# Patient Record
Sex: MALE | Race: WHITE | NOT HISPANIC OR LATINO | URBAN - METROPOLITAN AREA
[De-identification: names, ages, dates, MRNs, and addresses within clinical notes are randomized per-mention and may not be internally consistent; named-entity substitution may affect disease eponyms.]

---

## 2017-03-03 ENCOUNTER — OUTPATIENT (OUTPATIENT)
Dept: OUTPATIENT SERVICES | Facility: HOSPITAL | Age: 72
LOS: 1 days | End: 2017-03-03

## 2018-04-04 ENCOUNTER — RECORD ABSTRACTING (OUTPATIENT)
Age: 73
End: 2018-04-04

## 2018-04-04 DIAGNOSIS — M70.72 OTHER BURSITIS OF HIP, LEFT HIP: ICD-10-CM

## 2018-04-04 DIAGNOSIS — N43.40 SPERMATOCELE OF EPIDIDYMIS, UNSPECIFIED: ICD-10-CM

## 2018-04-04 DIAGNOSIS — N50.3 CYST OF EPIDIDYMIS: ICD-10-CM

## 2018-04-04 DIAGNOSIS — K63.5 POLYP OF COLON: ICD-10-CM

## 2018-04-04 DIAGNOSIS — Z82.49 FAMILY HISTORY OF ISCHEMIC HEART DISEASE AND OTHER DISEASES OF THE CIRCULATORY SYSTEM: ICD-10-CM

## 2018-04-04 DIAGNOSIS — Z87.898 PERSONAL HISTORY OF OTHER SPECIFIED CONDITIONS: ICD-10-CM

## 2018-04-04 DIAGNOSIS — M24.541 CONTRACTURE, RIGHT HAND: ICD-10-CM

## 2018-04-04 DIAGNOSIS — D36.9 BENIGN NEOPLASM, UNSPECIFIED SITE: ICD-10-CM

## 2018-04-04 RX ORDER — CYANOCOBALAMIN (VITAMIN B-12) 1000 MCG
1000 TABLET ORAL
Refills: 0 | Status: ACTIVE | COMMUNITY

## 2018-04-04 RX ORDER — METOPROLOL TARTRATE 25 MG/1
25 TABLET, FILM COATED ORAL TWICE DAILY
Refills: 0 | Status: ACTIVE | COMMUNITY

## 2018-04-04 RX ORDER — ASPIRIN 81 MG
81 TABLET, DELAYED RELEASE (ENTERIC COATED) ORAL
Refills: 0 | Status: ACTIVE | COMMUNITY

## 2018-04-04 RX ORDER — TIMOLOL MALEATE 5 MG/ML
0.5 SOLUTION OPHTHALMIC TWICE DAILY
Refills: 0 | Status: ACTIVE | COMMUNITY

## 2018-04-04 RX ORDER — LUTEIN 6 MG
20 TABLET ORAL
Refills: 0 | Status: ACTIVE | COMMUNITY

## 2018-04-09 ENCOUNTER — APPOINTMENT (OUTPATIENT)
Dept: FAMILY MEDICINE | Facility: CLINIC | Age: 73
End: 2018-04-09

## 2018-06-08 ENCOUNTER — APPOINTMENT (OUTPATIENT)
Dept: FAMILY MEDICINE | Facility: CLINIC | Age: 73
End: 2018-06-08
Payer: MEDICARE

## 2018-06-08 VITALS
BODY MASS INDEX: 22.44 KG/M2 | HEART RATE: 90 BPM | HEIGHT: 67 IN | RESPIRATION RATE: 15 BRPM | DIASTOLIC BLOOD PRESSURE: 90 MMHG | SYSTOLIC BLOOD PRESSURE: 166 MMHG | TEMPERATURE: 98.1 F | OXYGEN SATURATION: 98 % | WEIGHT: 143 LBS

## 2018-06-08 PROCEDURE — 99213 OFFICE O/P EST LOW 20 MIN: CPT | Mod: 25

## 2018-06-08 PROCEDURE — 81002 URINALYSIS NONAUTO W/O SCOPE: CPT | Mod: QW

## 2018-06-08 PROCEDURE — 36415 COLL VENOUS BLD VENIPUNCTURE: CPT

## 2018-06-08 PROCEDURE — 82044 UR ALBUMIN SEMIQUANTITATIVE: CPT | Mod: QW

## 2018-06-08 RX ORDER — ROSUVASTATIN CALCIUM 10 MG/1
10 TABLET, FILM COATED ORAL
Qty: 90 | Refills: 0 | Status: ACTIVE | COMMUNITY
Start: 2018-01-16

## 2018-06-08 RX ORDER — RAMIPRIL 5 MG/1
5 CAPSULE ORAL DAILY
Refills: 0 | Status: DISCONTINUED | COMMUNITY
End: 2018-06-08

## 2018-06-08 RX ORDER — ROSUVASTATIN CALCIUM 5 MG/1
5 TABLET, FILM COATED ORAL DAILY
Refills: 0 | Status: DISCONTINUED | COMMUNITY
End: 2018-06-08

## 2018-06-08 NOTE — HISTORY OF PRESENT ILLNESS
[FreeTextEntry1] : follow up, sees cardiologist for refills cc: denies [de-identified] : stable no changes

## 2018-06-12 LAB
ALBUMIN SERPL ELPH-MCNC: 4.4 G/DL
ALBUMIN: NORMAL
ALP BLD-CCNC: 109 U/L
ALT SERPL-CCNC: 23 U/L
ANION GAP SERPL CALC-SCNC: 10 MMOL/L
AST SERPL-CCNC: 23 U/L
BASOPHILS # BLD AUTO: 0.03 K/UL
BASOPHILS NFR BLD AUTO: 0.5 %
BILIRUB SERPL-MCNC: 0.6 MG/DL
BILIRUB UR QL STRIP: NORMAL
BUN SERPL-MCNC: 14 MG/DL
CALCIUM SERPL-MCNC: 9.5 MG/DL
CHLORIDE SERPL-SCNC: 104 MMOL/L
CHOLEST SERPL-MCNC: 126 MG/DL
CHOLEST/HDLC SERPL: 2.3 RATIO
CO2 SERPL-SCNC: 27 MMOL/L
CREAT SERPL-MCNC: 0.95 MG/DL
CREATININE: NORMAL
EOSINOPHIL # BLD AUTO: 0.17 K/UL
EOSINOPHIL NFR BLD AUTO: 2.8 %
GLUCOSE SERPL-MCNC: 102 MG/DL
GLUCOSE UR-MCNC: NORMAL
HCG UR QL: 0.2 EU/DL
HCT VFR BLD CALC: 39.2 %
HDLC SERPL-MCNC: 54 MG/DL
HGB BLD-MCNC: 12.3 G/DL
HGB UR QL STRIP.AUTO: NORMAL
IMM GRANULOCYTES NFR BLD AUTO: 0 %
KETONES UR-MCNC: NORMAL
LDLC SERPL CALC-MCNC: 54 MG/DL
LEUKOCYTE ESTERASE UR QL STRIP: NORMAL
LYMPHOCYTES # BLD AUTO: 1.44 K/UL
LYMPHOCYTES NFR BLD AUTO: 24 %
MAN DIFF?: NORMAL
MCHC RBC-ENTMCNC: 29.4 PG
MCHC RBC-ENTMCNC: 31.4 GM/DL
MCV RBC AUTO: 93.6 FL
MICROALBUMIN/CREAT UR TEST STR-RTO: NORMAL
MONOCYTES # BLD AUTO: 0.57 K/UL
MONOCYTES NFR BLD AUTO: 9.5 %
NEUTROPHILS # BLD AUTO: 3.79 K/UL
NEUTROPHILS NFR BLD AUTO: 63.2 %
NITRITE UR QL STRIP: NORMAL
PH UR STRIP: 7
PLATELET # BLD AUTO: 221 K/UL
POTASSIUM SERPL-SCNC: 5.3 MMOL/L
PROT SERPL-MCNC: 6.7 G/DL
PROT UR STRIP-MCNC: NORMAL
PSA SERPL-MCNC: 2.62 NG/ML
RBC # BLD: 4.19 M/UL
RBC # FLD: 12.6 %
SODIUM SERPL-SCNC: 141 MMOL/L
SP GR UR STRIP: 1.01
TRIGL SERPL-MCNC: 88 MG/DL
TSH SERPL-ACNC: 2.86 UIU/ML
VIT B12 SERPL-MCNC: 392 PG/ML
WBC # FLD AUTO: 6 K/UL

## 2018-10-31 ENCOUNTER — APPOINTMENT (OUTPATIENT)
Dept: FAMILY MEDICINE | Facility: CLINIC | Age: 73
End: 2018-10-31
Payer: MEDICARE

## 2018-10-31 VITALS
SYSTOLIC BLOOD PRESSURE: 176 MMHG | TEMPERATURE: 97.8 F | RESPIRATION RATE: 16 BRPM | DIASTOLIC BLOOD PRESSURE: 70 MMHG | BODY MASS INDEX: 22.76 KG/M2 | HEART RATE: 44 BPM | HEIGHT: 67 IN | WEIGHT: 145 LBS | OXYGEN SATURATION: 97 %

## 2018-10-31 PROCEDURE — 99213 OFFICE O/P EST LOW 20 MIN: CPT | Mod: 25

## 2018-10-31 PROCEDURE — G0008: CPT

## 2018-10-31 PROCEDURE — 36415 COLL VENOUS BLD VENIPUNCTURE: CPT

## 2018-10-31 RX ORDER — OMEPRAZOLE 40 MG/1
40 CAPSULE, DELAYED RELEASE ORAL
Refills: 0 | Status: ACTIVE | COMMUNITY

## 2018-10-31 NOTE — HEALTH RISK ASSESSMENT
[No falls in past year] : Patient reported no falls in the past year [0] : 2) Feeling down, depressed, or hopeless: Not at all (0) [] : No [de-identified] : Former [[id number]] : 0

## 2018-10-31 NOTE — PHYSICAL EXAM

## 2018-10-31 NOTE — HISTORY OF PRESENT ILLNESS
[FreeTextEntry1] : 3 Month F/u \par Pt is requesting Flu Vac. \par cc: left chest wall discomfort [de-identified] : thoracic surgeon evaluated chest wall closure affects\par no chest pain no sob \par hx aortic valve replacement

## 2018-11-01 LAB
ALBUMIN SERPL ELPH-MCNC: 4.1 G/DL
ALP BLD-CCNC: 88 U/L
ALT SERPL-CCNC: 25 U/L
ANION GAP SERPL CALC-SCNC: 11 MMOL/L
AST SERPL-CCNC: 24 U/L
BASOPHILS # BLD AUTO: 0.04 K/UL
BASOPHILS NFR BLD AUTO: 0.7 %
BILIRUB SERPL-MCNC: 0.5 MG/DL
BUN SERPL-MCNC: 10 MG/DL
CALCIUM SERPL-MCNC: 9.8 MG/DL
CHLORIDE SERPL-SCNC: 104 MMOL/L
CHOLEST SERPL-MCNC: 133 MG/DL
CHOLEST/HDLC SERPL: 2.4 RATIO
CO2 SERPL-SCNC: 27 MMOL/L
CREAT SERPL-MCNC: 0.88 MG/DL
EOSINOPHIL # BLD AUTO: 0.19 K/UL
EOSINOPHIL NFR BLD AUTO: 3.3 %
GLUCOSE SERPL-MCNC: 102 MG/DL
HCT VFR BLD CALC: 41 %
HDLC SERPL-MCNC: 55 MG/DL
HGB BLD-MCNC: 13.3 G/DL
IMM GRANULOCYTES NFR BLD AUTO: 0.2 %
LDLC SERPL CALC-MCNC: 62 MG/DL
LYMPHOCYTES # BLD AUTO: 1.27 K/UL
LYMPHOCYTES NFR BLD AUTO: 22.2 %
MAN DIFF?: NORMAL
MCHC RBC-ENTMCNC: 30.4 PG
MCHC RBC-ENTMCNC: 32.4 GM/DL
MCV RBC AUTO: 93.6 FL
MONOCYTES # BLD AUTO: 0.67 K/UL
MONOCYTES NFR BLD AUTO: 11.7 %
NEUTROPHILS # BLD AUTO: 3.54 K/UL
NEUTROPHILS NFR BLD AUTO: 61.9 %
PLATELET # BLD AUTO: 217 K/UL
POTASSIUM SERPL-SCNC: 5.1 MMOL/L
PROT SERPL-MCNC: 6.5 G/DL
PSA SERPL-MCNC: 2.74 NG/ML
RBC # BLD: 4.38 M/UL
RBC # FLD: 12.6 %
SODIUM SERPL-SCNC: 142 MMOL/L
TRIGL SERPL-MCNC: 82 MG/DL
TSH SERPL-ACNC: 2.5 UIU/ML
WBC # FLD AUTO: 5.72 K/UL

## 2018-11-14 ENCOUNTER — TRANSCRIPTION ENCOUNTER (OUTPATIENT)
Age: 73
End: 2018-11-14

## 2018-12-19 DIAGNOSIS — R35.1 NOCTURIA: ICD-10-CM

## 2019-03-27 ENCOUNTER — TRANSCRIPTION ENCOUNTER (OUTPATIENT)
Age: 74
End: 2019-03-27

## 2019-04-25 ENCOUNTER — APPOINTMENT (OUTPATIENT)
Dept: FAMILY MEDICINE | Facility: CLINIC | Age: 74
End: 2019-04-25
Payer: MEDICARE

## 2019-04-25 VITALS
HEART RATE: 48 BPM | SYSTOLIC BLOOD PRESSURE: 142 MMHG | DIASTOLIC BLOOD PRESSURE: 52 MMHG | WEIGHT: 144 LBS | OXYGEN SATURATION: 98 % | TEMPERATURE: 98.6 F | HEIGHT: 67 IN | BODY MASS INDEX: 22.6 KG/M2 | RESPIRATION RATE: 16 BRPM

## 2019-04-25 DIAGNOSIS — Z78.9 OTHER SPECIFIED HEALTH STATUS: ICD-10-CM

## 2019-04-25 DIAGNOSIS — H91.90 UNSPECIFIED HEARING LOSS, UNSPECIFIED EAR: ICD-10-CM

## 2019-04-25 LAB
ALBUMIN: NORMAL
BILIRUB UR QL STRIP: NORMAL
CREATININE: NORMAL
GLUCOSE UR-MCNC: NORMAL
HCG UR QL: 0.2 EU/DL
HGB UR QL STRIP.AUTO: NORMAL
KETONES UR-MCNC: NORMAL
LEUKOCYTE ESTERASE UR QL STRIP: NORMAL
MICROALBUMIN/CREAT UR TEST STR-RTO: NORMAL
NITRITE UR QL STRIP: NORMAL
PH UR STRIP: 7
PROT UR STRIP-MCNC: NORMAL
SP GR UR STRIP: 1.01

## 2019-04-25 PROCEDURE — 90715 TDAP VACCINE 7 YRS/> IM: CPT | Mod: GY

## 2019-04-25 PROCEDURE — G0444 DEPRESSION SCREEN ANNUAL: CPT | Mod: 59

## 2019-04-25 PROCEDURE — 81003 URINALYSIS AUTO W/O SCOPE: CPT | Mod: QW

## 2019-04-25 PROCEDURE — 82044 UR ALBUMIN SEMIQUANTITATIVE: CPT | Mod: QW

## 2019-04-25 PROCEDURE — 90471 IMMUNIZATION ADMIN: CPT | Mod: GY

## 2019-04-25 PROCEDURE — 99214 OFFICE O/P EST MOD 30 MIN: CPT | Mod: 25

## 2019-04-25 PROCEDURE — 36415 COLL VENOUS BLD VENIPUNCTURE: CPT

## 2019-04-25 PROCEDURE — G0442 ANNUAL ALCOHOL SCREEN 15 MIN: CPT | Mod: 59

## 2019-04-25 NOTE — HISTORY OF PRESENT ILLNESS
[FreeTextEntry1] : routine f/u with Dr Whitehead -FASTING - pt presents with bw orders from Dr Ashley and Dr Gordon\par states he does not need refills at this time [de-identified] : 73 male htn hld gerd\par controlled with meds\par  followed by cardio\par CAD CABG aortic valve replacement bovine \par nasal congetsion rhinorrhea\par

## 2019-04-25 NOTE — PHYSICAL EXAM
[No Acute Distress] : no acute distress [Well Nourished] : well nourished [Well Developed] : well developed [Well-Appearing] : well-appearing [Normal Sclera/Conjunctiva] : normal sclera/conjunctiva [PERRL] : pupils equal round and reactive to light [EOMI] : extraocular movements intact [Normal Outer Ear/Nose] : the outer ears and nose were normal in appearance [Normal Oropharynx] : the oropharynx was normal [No JVD] : no jugular venous distention [Supple] : supple [No Lymphadenopathy] : no lymphadenopathy [Thyroid Normal, No Nodules] : the thyroid was normal and there were no nodules present [No Respiratory Distress] : no respiratory distress  [No Accessory Muscle Use] : no accessory muscle use [Clear to Auscultation] : lungs were clear to auscultation bilaterally [Normal Rate] : normal rate  [Regular Rhythm] : with a regular rhythm [Normal S1, S2] : normal S1 and S2 [No Carotid Bruits] : no carotid bruits [No Abdominal Bruit] : a ~M bruit was not heard ~T in the abdomen [No Varicosities] : no varicosities [Pedal Pulses Present] : the pedal pulses are present [No Edema] : there was no peripheral edema [No Palpable Aorta] : no palpable aorta [No Extremity Clubbing/Cyanosis] : no extremity clubbing/cyanosis [Soft] : abdomen soft [Non Tender] : non-tender [Non-distended] : non-distended [No Masses] : no abdominal mass palpated [No HSM] : no HSM [Normal Bowel Sounds] : normal bowel sounds [Normal Anterior Cervical Nodes] : no anterior cervical lymphadenopathy [Normal Posterior Cervical Nodes] : no posterior cervical lymphadenopathy [No Spinal Tenderness] : no spinal tenderness [No CVA Tenderness] : no CVA  tenderness [No Joint Swelling] : no joint swelling [Grossly Normal Strength/Tone] : grossly normal strength/tone [No Rash] : no rash [Normal Gait] : normal gait [Coordination Grossly Intact] : coordination grossly intact [No Focal Deficits] : no focal deficits [Deep Tendon Reflexes (DTR)] : deep tendon reflexes were 2+ and symmetric [Normal Insight/Judgement] : insight and judgment were intact [Normal Affect] : the affect was normal

## 2019-04-29 LAB
ALBUMIN SERPL ELPH-MCNC: 4.3 G/DL
ALP BLD-CCNC: 98 U/L
ALT SERPL-CCNC: 21 U/L
ANION GAP SERPL CALC-SCNC: 11 MMOL/L
AST SERPL-CCNC: 22 U/L
BASOPHILS # BLD AUTO: 0.03 K/UL
BASOPHILS NFR BLD AUTO: 0.5 %
BILIRUB SERPL-MCNC: 0.5 MG/DL
BUN SERPL-MCNC: 13 MG/DL
CALCIUM SERPL-MCNC: 9.6 MG/DL
CHLORIDE SERPL-SCNC: 102 MMOL/L
CHOLEST SERPL-MCNC: 121 MG/DL
CHOLEST/HDLC SERPL: 2.5 RATIO
CO2 SERPL-SCNC: 26 MMOL/L
CREAT SERPL-MCNC: 0.94 MG/DL
EOSINOPHIL # BLD AUTO: 0.1 K/UL
EOSINOPHIL NFR BLD AUTO: 1.7 %
GLUCOSE SERPL-MCNC: 97 MG/DL
HCT VFR BLD CALC: 40.8 %
HDLC SERPL-MCNC: 49 MG/DL
HGB BLD-MCNC: 13 G/DL
IMM GRANULOCYTES NFR BLD AUTO: 0.3 %
IRON SATN MFR SERPL: 27 %
IRON SERPL-MCNC: 77 UG/DL
LDLC SERPL CALC-MCNC: 57 MG/DL
LYMPHOCYTES # BLD AUTO: 1.24 K/UL
LYMPHOCYTES NFR BLD AUTO: 20.7 %
MAN DIFF?: NORMAL
MCHC RBC-ENTMCNC: 30.5 PG
MCHC RBC-ENTMCNC: 31.9 GM/DL
MCV RBC AUTO: 95.8 FL
MONOCYTES # BLD AUTO: 0.57 K/UL
MONOCYTES NFR BLD AUTO: 9.5 %
NEUTROPHILS # BLD AUTO: 4.03 K/UL
NEUTROPHILS NFR BLD AUTO: 67.3 %
PLATELET # BLD AUTO: 208 K/UL
POTASSIUM SERPL-SCNC: 4.7 MMOL/L
PROT SERPL-MCNC: 6.4 G/DL
PSA SERPL-MCNC: 2.82 NG/ML
RBC # BLD: 4.26 M/UL
RBC # FLD: 11.9 %
SODIUM SERPL-SCNC: 138 MMOL/L
TIBC SERPL-MCNC: 288 UG/DL
TRIGL SERPL-MCNC: 74 MG/DL
TSH SERPL-ACNC: 2.63 UIU/ML
UIBC SERPL-MCNC: 211 UG/DL
WBC # FLD AUTO: 5.99 K/UL

## 2019-05-17 ENCOUNTER — APPOINTMENT (OUTPATIENT)
Dept: RADIOLOGY | Facility: CLINIC | Age: 74
End: 2019-05-17
Payer: MEDICARE

## 2019-05-17 PROCEDURE — 71100 X-RAY EXAM RIBS UNI 2 VIEWS: CPT | Mod: LT

## 2019-06-13 ENCOUNTER — OUTPATIENT (OUTPATIENT)
Dept: OUTPATIENT SERVICES | Facility: HOSPITAL | Age: 74
LOS: 1 days | End: 2019-06-13

## 2019-07-11 ENCOUNTER — APPOINTMENT (OUTPATIENT)
Dept: FAMILY MEDICINE | Facility: CLINIC | Age: 74
End: 2019-07-11
Payer: MEDICARE

## 2019-07-11 VITALS
OXYGEN SATURATION: 98 % | HEIGHT: 67 IN | DIASTOLIC BLOOD PRESSURE: 58 MMHG | BODY MASS INDEX: 22.91 KG/M2 | SYSTOLIC BLOOD PRESSURE: 138 MMHG | RESPIRATION RATE: 16 BRPM | TEMPERATURE: 98.3 F | HEART RATE: 49 BPM | WEIGHT: 146 LBS
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
## 2019-07-11 DIAGNOSIS — M54.5 LOW BACK PAIN: ICD-10-CM

## 2019-07-11 PROCEDURE — 99213 OFFICE O/P EST LOW 20 MIN: CPT

## 2019-07-11 NOTE — PHYSICAL EXAM
[No Acute Distress] : no acute distress [No CVA Tenderness] : no CVA  tenderness [No Spinal Tenderness] : no spinal tenderness [No Focal Deficits] : no focal deficits [de-identified] : slow gait

## 2019-07-11 NOTE — HISTORY OF PRESENT ILLNESS
[FreeTextEntry8] : pt pulled back on fourth of july \par pt states he was bit by a tick a month ago but was informed to wait \par [facility] [place] \par \par slowly getting better was 8/10 now 5/10\par slow walking\par getting up worse \par \par tick bite left ankle 1 mo no change

## 2019-08-05 ENCOUNTER — RX RENEWAL (OUTPATIENT)
Age: 74
End: 2019-08-05

## 2019-09-17 ENCOUNTER — APPOINTMENT (OUTPATIENT)
Dept: FAMILY MEDICINE | Facility: CLINIC | Age: 74
End: 2019-09-17
Payer: MEDICARE

## 2019-09-17 VITALS
HEIGHT: 67 IN | WEIGHT: 147 LBS | DIASTOLIC BLOOD PRESSURE: 60 MMHG | TEMPERATURE: 98 F | HEART RATE: 54 BPM | OXYGEN SATURATION: 98 % | RESPIRATION RATE: 16 BRPM | SYSTOLIC BLOOD PRESSURE: 140 MMHG | BODY MASS INDEX: 23.07 KG/M2

## 2019-09-17 DIAGNOSIS — Z97.4 PRESENCE OF EXTERNAL HEARING-AID: ICD-10-CM

## 2019-09-17 PROCEDURE — 99214 OFFICE O/P EST MOD 30 MIN: CPT

## 2019-09-17 NOTE — HISTORY OF PRESENT ILLNESS
[Post-hospitalization from ___ Hospital] : Post-hospitalization from [unfilled] Hospital [Admitted on: ___] : The patient was admitted on [unfilled] [FreeTextEntry2] : Pt was away in [place] and was seen at [facility], He was hiking and fell on a rock, landing on his left side, fractured left humerus. \par Pt had x-rays done at hospital of left shoulder and humerus \par Pt was prescribed Ketorolac 10mg, and Oxycodone 5-325mg \par Pt brought in discharge papers \par pain worse at night

## 2019-09-17 NOTE — PHYSICAL EXAM
[Well Nourished] : well nourished [No Acute Distress] : no acute distress [Well Developed] : well developed [Well-Appearing] : well-appearing [Normal Sclera/Conjunctiva] : normal sclera/conjunctiva [EOMI] : extraocular movements intact [PERRL] : pupils equal round and reactive to light [Normal Oropharynx] : the oropharynx was normal [Normal Outer Ear/Nose] : the outer ears and nose were normal in appearance [No JVD] : no jugular venous distention [No Lymphadenopathy] : no lymphadenopathy [Supple] : supple [No Respiratory Distress] : no respiratory distress  [Thyroid Normal, No Nodules] : the thyroid was normal and there were no nodules present [No Accessory Muscle Use] : no accessory muscle use [Clear to Auscultation] : lungs were clear to auscultation bilaterally [Normal Rate] : normal rate  [No Murmur] : no murmur heard [Regular Rhythm] : with a regular rhythm [Normal S1, S2] : normal S1 and S2 [No Carotid Bruits] : no carotid bruits [No Abdominal Bruit] : a ~M bruit was not heard ~T in the abdomen [No Varicosities] : no varicosities [Pedal Pulses Present] : the pedal pulses are present [No Palpable Aorta] : no palpable aorta [No Edema] : there was no peripheral edema [Non Tender] : non-tender [Soft] : abdomen soft [No Extremity Clubbing/Cyanosis] : no extremity clubbing/cyanosis [Non-distended] : non-distended [No Masses] : no abdominal mass palpated [Normal Bowel Sounds] : normal bowel sounds [No HSM] : no HSM [Normal Posterior Cervical Nodes] : no posterior cervical lymphadenopathy [No CVA Tenderness] : no CVA  tenderness [Normal Anterior Cervical Nodes] : no anterior cervical lymphadenopathy [No Joint Swelling] : no joint swelling [No Spinal Tenderness] : no spinal tenderness [Coordination Grossly Intact] : coordination grossly intact [No Rash] : no rash [No Focal Deficits] : no focal deficits [Normal Gait] : normal gait [Deep Tendon Reflexes (DTR)] : deep tendon reflexes were 2+ and symmetric [Normal Affect] : the affect was normal [Normal Insight/Judgement] : insight and judgment were intact [de-identified] : bruising lue  [de-identified] : [facility]

## 2019-10-17 ENCOUNTER — APPOINTMENT (OUTPATIENT)
Dept: FAMILY MEDICINE | Facility: CLINIC | Age: 74
End: 2019-10-17
Payer: MEDICARE

## 2019-10-17 VITALS
OXYGEN SATURATION: 99 % | TEMPERATURE: 97.9 F | WEIGHT: 147 LBS | HEART RATE: 51 BPM | SYSTOLIC BLOOD PRESSURE: 130 MMHG | DIASTOLIC BLOOD PRESSURE: 70 MMHG | BODY MASS INDEX: 23.07 KG/M2 | HEIGHT: 67 IN | RESPIRATION RATE: 16 BRPM

## 2019-10-17 DIAGNOSIS — D50.9 IRON DEFICIENCY ANEMIA, UNSPECIFIED: ICD-10-CM

## 2019-10-17 DIAGNOSIS — Z12.5 ENCOUNTER FOR SCREENING FOR MALIGNANT NEOPLASM OF PROSTATE: ICD-10-CM

## 2019-10-17 DIAGNOSIS — Q23.9 CONGENITAL MALFORMATION OF AORTIC AND MITRAL VALVES, UNSPECIFIED: ICD-10-CM

## 2019-10-17 DIAGNOSIS — S42.309A UNSPECIFIED FRACTURE OF SHAFT OF HUMERUS, UNSPECIFIED ARM, INITIAL ENCOUNTER FOR CLOSED FRACTURE: ICD-10-CM

## 2019-10-17 DIAGNOSIS — Z80.42 FAMILY HISTORY OF MALIGNANT NEOPLASM OF PROSTATE: ICD-10-CM

## 2019-10-17 PROCEDURE — 90686 IIV4 VACC NO PRSV 0.5 ML IM: CPT

## 2019-10-17 PROCEDURE — G0008: CPT

## 2019-10-17 PROCEDURE — 99214 OFFICE O/P EST MOD 30 MIN: CPT | Mod: 25

## 2019-10-17 PROCEDURE — 36415 COLL VENOUS BLD VENIPUNCTURE: CPT

## 2019-10-17 NOTE — HISTORY OF PRESENT ILLNESS
[FreeTextEntry1] : Pt here for a follow up\par Lab results \par Requesting FLU Vaccine  [de-identified] : left humerus fx sling brace removed pt rehab pending 1 week pain with movement \par skin changes \par followed by cardio check lipids review meds\par  alert active nad iron def\par started iron 1 week ago\par anemia

## 2019-10-17 NOTE — PLAN
[FreeTextEntry1] : ortho fu \par pt rehab \par cardio fu as directed \par statin therapy \par iron\par 3 mo fu sooner as needed.\par

## 2019-10-17 NOTE — PHYSICAL EXAM
[No Acute Distress] : no acute distress [Well Developed] : well developed [Well Nourished] : well nourished [Normal Sclera/Conjunctiva] : normal sclera/conjunctiva [PERRL] : pupils equal round and reactive to light [Well-Appearing] : well-appearing [Normal Outer Ear/Nose] : the outer ears and nose were normal in appearance [EOMI] : extraocular movements intact [Normal Oropharynx] : the oropharynx was normal [No JVD] : no jugular venous distention [No Lymphadenopathy] : no lymphadenopathy [Supple] : supple [No Respiratory Distress] : no respiratory distress  [Thyroid Normal, No Nodules] : the thyroid was normal and there were no nodules present [Clear to Auscultation] : lungs were clear to auscultation bilaterally [No Accessory Muscle Use] : no accessory muscle use [Normal Rate] : normal rate  [Regular Rhythm] : with a regular rhythm [Normal S1, S2] : normal S1 and S2 [No Murmur] : no murmur heard [No Carotid Bruits] : no carotid bruits [No Abdominal Bruit] : a ~M bruit was not heard ~T in the abdomen [No Varicosities] : no varicosities [No Edema] : there was no peripheral edema [Pedal Pulses Present] : the pedal pulses are present [No Palpable Aorta] : no palpable aorta [No Extremity Clubbing/Cyanosis] : no extremity clubbing/cyanosis [Non Tender] : non-tender [Soft] : abdomen soft [Non-distended] : non-distended [No HSM] : no HSM [No Masses] : no abdominal mass palpated [Normal Bowel Sounds] : normal bowel sounds [Normal Posterior Cervical Nodes] : no posterior cervical lymphadenopathy [Normal Anterior Cervical Nodes] : no anterior cervical lymphadenopathy [No CVA Tenderness] : no CVA  tenderness [No Spinal Tenderness] : no spinal tenderness [No Joint Swelling] : no joint swelling [No Rash] : no rash [No Focal Deficits] : no focal deficits [Coordination Grossly Intact] : coordination grossly intact [Normal Gait] : normal gait [Deep Tendon Reflexes (DTR)] : deep tendon reflexes were 2+ and symmetric [Normal Insight/Judgement] : insight and judgment were intact [Normal Affect] : the affect was normal [de-identified] : limited ext rom guarded lue  [de-identified] : skin changes noted affected arm

## 2019-10-21 LAB
ALBUMIN SERPL ELPH-MCNC: 4.7 G/DL
ALP BLD-CCNC: 113 U/L
ALT SERPL-CCNC: 21 U/L
ANION GAP SERPL CALC-SCNC: 12 MMOL/L
AST SERPL-CCNC: 23 U/L
BASOPHILS # BLD AUTO: 0.07 K/UL
BASOPHILS NFR BLD AUTO: 1.1 %
BILIRUB SERPL-MCNC: 0.4 MG/DL
BUN SERPL-MCNC: 13 MG/DL
CALCIUM SERPL-MCNC: 9.8 MG/DL
CHLORIDE SERPL-SCNC: 102 MMOL/L
CHOLEST SERPL-MCNC: 144 MG/DL
CHOLEST/HDLC SERPL: 2.5 RATIO
CO2 SERPL-SCNC: 25 MMOL/L
CREAT SERPL-MCNC: 0.83 MG/DL
EOSINOPHIL # BLD AUTO: 0.23 K/UL
EOSINOPHIL NFR BLD AUTO: 3.5 %
FERRITIN SERPL-MCNC: 359 NG/ML
GLUCOSE SERPL-MCNC: 93 MG/DL
HCT VFR BLD CALC: 39.2 %
HDLC SERPL-MCNC: 57 MG/DL
HGB BLD-MCNC: 12.3 G/DL
IMM GRANULOCYTES NFR BLD AUTO: 0.3 %
IRON SATN MFR SERPL: 23 %
IRON SERPL-MCNC: 71 UG/DL
LDLC SERPL CALC-MCNC: 69 MG/DL
LYMPHOCYTES # BLD AUTO: 1.21 K/UL
LYMPHOCYTES NFR BLD AUTO: 18.6 %
MAN DIFF?: NORMAL
MCHC RBC-ENTMCNC: 29.8 PG
MCHC RBC-ENTMCNC: 31.4 GM/DL
MCV RBC AUTO: 94.9 FL
MONOCYTES # BLD AUTO: 0.56 K/UL
MONOCYTES NFR BLD AUTO: 8.6 %
NEUTROPHILS # BLD AUTO: 4.43 K/UL
NEUTROPHILS NFR BLD AUTO: 67.9 %
PLATELET # BLD AUTO: 212 K/UL
POTASSIUM SERPL-SCNC: 4.9 MMOL/L
PROT SERPL-MCNC: 6.8 G/DL
RBC # BLD: 4.13 M/UL
RBC # FLD: 12.5 %
SODIUM SERPL-SCNC: 139 MMOL/L
TIBC SERPL-MCNC: 308 UG/DL
TRIGL SERPL-MCNC: 91 MG/DL
UIBC SERPL-MCNC: 237 UG/DL
WBC # FLD AUTO: 6.52 K/UL

## 2020-05-07 ENCOUNTER — APPOINTMENT (OUTPATIENT)
Dept: FAMILY MEDICINE | Facility: CLINIC | Age: 75
End: 2020-05-07
Payer: MEDICARE

## 2020-05-07 VITALS
RESPIRATION RATE: 16 BRPM | WEIGHT: 138 LBS | HEIGHT: 67 IN | SYSTOLIC BLOOD PRESSURE: 190 MMHG | TEMPERATURE: 98.1 F | HEART RATE: 50 BPM | DIASTOLIC BLOOD PRESSURE: 70 MMHG | BODY MASS INDEX: 21.66 KG/M2 | OXYGEN SATURATION: 99 %

## 2020-05-07 DIAGNOSIS — K21.9 GASTRO-ESOPHAGEAL REFLUX DISEASE W/OUT ESOPHAGITIS: ICD-10-CM

## 2020-05-07 DIAGNOSIS — H40.9 UNSPECIFIED GLAUCOMA: ICD-10-CM

## 2020-05-07 DIAGNOSIS — R51 HEADACHE: ICD-10-CM

## 2020-05-07 DIAGNOSIS — E78.2 MIXED HYPERLIPIDEMIA: ICD-10-CM

## 2020-05-07 PROCEDURE — 36415 COLL VENOUS BLD VENIPUNCTURE: CPT

## 2020-05-07 PROCEDURE — 99214 OFFICE O/P EST MOD 30 MIN: CPT | Mod: 25

## 2020-05-07 RX ORDER — FERROUS SULFATE 325(65) MG
325 (65 FE) TABLET ORAL DAILY
Qty: 90 | Refills: 0 | Status: DISCONTINUED | COMMUNITY
Start: 2019-09-17 | End: 2020-05-07

## 2020-05-07 RX ORDER — TIZANIDINE 4 MG/1
4 TABLET ORAL
Qty: 60 | Refills: 0 | Status: DISCONTINUED | COMMUNITY
Start: 2019-07-11 | End: 2020-05-07

## 2020-05-07 RX ORDER — AMOXICILLIN 500 MG/1
500 CAPSULE ORAL
Qty: 4 | Refills: 0 | Status: DISCONTINUED | COMMUNITY
Start: 2017-12-21 | End: 2020-05-07

## 2020-05-07 RX ORDER — ACETAMINOPHEN AND CODEINE PHOSPHATE 300; 30 MG/1; MG/1
300-30 TABLET ORAL
Qty: 30 | Refills: 0 | Status: DISCONTINUED | COMMUNITY
Start: 2019-09-17 | End: 2020-05-07

## 2020-05-07 NOTE — HISTORY OF PRESENT ILLNESS
[FreeTextEntry1] : Pt here for follow up and fasting blood work\par Pt c/o pressure headache, between eyes, sneezing, followed by nasal discharge, stuffy nose while sleeping usually on one side \par [facility] [place]  [de-identified] : and elevated bp few weeks 4-6\par stable alert NAD\par Review of symptoms\par as above \par bp 160/palp\par review meds polypharm

## 2020-05-07 NOTE — PHYSICAL EXAM
[Well Nourished] : well nourished [No Acute Distress] : no acute distress [Well Developed] : well developed [Normal Sclera/Conjunctiva] : normal sclera/conjunctiva [Well-Appearing] : well-appearing [EOMI] : extraocular movements intact [PERRL] : pupils equal round and reactive to light [Normal Outer Ear/Nose] : the outer ears and nose were normal in appearance [Normal Oropharynx] : the oropharynx was normal [No JVD] : no jugular venous distention [Supple] : supple [No Lymphadenopathy] : no lymphadenopathy [Thyroid Normal, No Nodules] : the thyroid was normal and there were no nodules present [No Respiratory Distress] : no respiratory distress  [No Accessory Muscle Use] : no accessory muscle use [Normal Rate] : normal rate  [Clear to Auscultation] : lungs were clear to auscultation bilaterally [No Murmur] : no murmur heard [Regular Rhythm] : with a regular rhythm [Normal S1, S2] : normal S1 and S2 [No Carotid Bruits] : no carotid bruits [No Abdominal Bruit] : a ~M bruit was not heard ~T in the abdomen [No Varicosities] : no varicosities [No Edema] : there was no peripheral edema [Pedal Pulses Present] : the pedal pulses are present [No Extremity Clubbing/Cyanosis] : no extremity clubbing/cyanosis [No Palpable Aorta] : no palpable aorta [Non-distended] : non-distended [Soft] : abdomen soft [Non Tender] : non-tender [No Masses] : no abdominal mass palpated [No HSM] : no HSM [Normal Posterior Cervical Nodes] : no posterior cervical lymphadenopathy [Normal Bowel Sounds] : normal bowel sounds [No CVA Tenderness] : no CVA  tenderness [Normal Anterior Cervical Nodes] : no anterior cervical lymphadenopathy [No Spinal Tenderness] : no spinal tenderness [No Joint Swelling] : no joint swelling [No Rash] : no rash [Grossly Normal Strength/Tone] : grossly normal strength/tone [Coordination Grossly Intact] : coordination grossly intact [No Focal Deficits] : no focal deficits [Normal Gait] : normal gait [Normal Insight/Judgement] : insight and judgment were intact [Normal Affect] : the affect was normal [Deep Tendon Reflexes (DTR)] : deep tendon reflexes were 2+ and symmetric

## 2020-05-07 NOTE — PLAN
[FreeTextEntry1] : medications as directed.\par 10 day fu bp\par urology fu as directed psa\par cardio fu

## 2020-05-07 NOTE — REVIEW OF SYSTEMS
[Patient Intake Form Reviewed] : Patient intake form was reviewed [Headache] : headache [Nasal Discharge] : nasal discharge [Negative] : Heme/Lymph

## 2020-05-08 LAB
ALBUMIN SERPL ELPH-MCNC: 4.4 G/DL
ALP BLD-CCNC: 90 U/L
ALT SERPL-CCNC: 15 U/L
ANION GAP SERPL CALC-SCNC: 13 MMOL/L
AST SERPL-CCNC: 18 U/L
BASOPHILS # BLD AUTO: 0.06 K/UL
BASOPHILS NFR BLD AUTO: 1.1 %
BILIRUB SERPL-MCNC: 0.5 MG/DL
BUN SERPL-MCNC: 15 MG/DL
CALCIUM SERPL-MCNC: 9.8 MG/DL
CHLORIDE SERPL-SCNC: 104 MMOL/L
CHOLEST SERPL-MCNC: 117 MG/DL
CHOLEST/HDLC SERPL: 2.4 RATIO
CO2 SERPL-SCNC: 24 MMOL/L
CREAT SERPL-MCNC: 0.91 MG/DL
EOSINOPHIL # BLD AUTO: 0.22 K/UL
EOSINOPHIL NFR BLD AUTO: 4 %
GLUCOSE SERPL-MCNC: 91 MG/DL
HCT VFR BLD CALC: 39 %
HDLC SERPL-MCNC: 49 MG/DL
HGB BLD-MCNC: 12 G/DL
IMM GRANULOCYTES NFR BLD AUTO: 0.4 %
LDLC SERPL CALC-MCNC: 55 MG/DL
LYMPHOCYTES # BLD AUTO: 1.04 K/UL
LYMPHOCYTES NFR BLD AUTO: 19 %
MAN DIFF?: NORMAL
MCHC RBC-ENTMCNC: 29.9 PG
MCHC RBC-ENTMCNC: 30.8 GM/DL
MCV RBC AUTO: 97 FL
MONOCYTES # BLD AUTO: 0.53 K/UL
MONOCYTES NFR BLD AUTO: 9.7 %
NEUTROPHILS # BLD AUTO: 3.61 K/UL
NEUTROPHILS NFR BLD AUTO: 65.8 %
PLATELET # BLD AUTO: 174 K/UL
POTASSIUM SERPL-SCNC: 4.5 MMOL/L
PROT SERPL-MCNC: 6.1 G/DL
PSA SERPL-MCNC: 2.38 NG/ML
RBC # BLD: 4.02 M/UL
RBC # FLD: 13 %
SODIUM SERPL-SCNC: 142 MMOL/L
TRIGL SERPL-MCNC: 64 MG/DL
TSH SERPL-ACNC: 2.61 UIU/ML
WBC # FLD AUTO: 5.48 K/UL

## 2020-05-18 ENCOUNTER — APPOINTMENT (OUTPATIENT)
Dept: FAMILY MEDICINE | Facility: CLINIC | Age: 75
End: 2020-05-18
Payer: MEDICARE

## 2020-05-18 ENCOUNTER — RESULT CHARGE (OUTPATIENT)
Age: 75
End: 2020-05-18

## 2020-05-18 VITALS
TEMPERATURE: 98.1 F | RESPIRATION RATE: 16 BRPM | WEIGHT: 140 LBS | DIASTOLIC BLOOD PRESSURE: 70 MMHG | HEIGHT: 67 IN | OXYGEN SATURATION: 98 % | HEART RATE: 52 BPM | SYSTOLIC BLOOD PRESSURE: 190 MMHG | BODY MASS INDEX: 21.97 KG/M2

## 2020-05-18 DIAGNOSIS — N40.0 BENIGN PROSTATIC HYPERPLASIA WITHOUT LOWER URINARY TRACT SYMPMS: ICD-10-CM

## 2020-05-18 DIAGNOSIS — I10 ESSENTIAL (PRIMARY) HYPERTENSION: ICD-10-CM

## 2020-05-18 DIAGNOSIS — R03.0 ELEVATED BLOOD-PRESSURE READING, W/OUT DIAGNOSIS OF HYPERTENSION: ICD-10-CM

## 2020-05-18 DIAGNOSIS — Z00.00 ENCOUNTER FOR GENERAL ADULT MEDICAL EXAMINATION W/OUT ABNORMAL FINDINGS: ICD-10-CM

## 2020-05-18 PROCEDURE — 82044 UR ALBUMIN SEMIQUANTITATIVE: CPT | Mod: QW

## 2020-05-18 PROCEDURE — 99213 OFFICE O/P EST LOW 20 MIN: CPT | Mod: 25

## 2020-05-18 PROCEDURE — 36415 COLL VENOUS BLD VENIPUNCTURE: CPT

## 2020-05-18 PROCEDURE — 81003 URINALYSIS AUTO W/O SCOPE: CPT | Mod: QW

## 2020-05-18 RX ORDER — LISINOPRIL 20 MG/1
20 TABLET ORAL
Qty: 180 | Refills: 1 | Status: ACTIVE | COMMUNITY
Start: 2018-03-21 | End: 1900-01-01

## 2020-05-19 PROBLEM — R03.0 ELEVATED BLOOD PRESSURE READING: Status: ACTIVE | Noted: 2020-05-07

## 2020-05-19 PROBLEM — N40.0 HYPERTROPHY OF PROSTATE WITHOUT URINARY OBSTRUCTION AND OTHER LOWER URINARY TRACT SYMPTOMS (LUTS): Status: ACTIVE | Noted: 2018-04-04

## 2020-05-19 LAB
BASOPHILS # BLD AUTO: 0.05 K/UL
BASOPHILS NFR BLD AUTO: 0.7 %
EOSINOPHIL # BLD AUTO: 0.27 K/UL
EOSINOPHIL NFR BLD AUTO: 4 %
HCT VFR BLD CALC: 39.4 %
HGB BLD-MCNC: 12.5 G/DL
IMM GRANULOCYTES NFR BLD AUTO: 0.3 %
LYMPHOCYTES # BLD AUTO: 1.12 K/UL
LYMPHOCYTES NFR BLD AUTO: 16.4 %
MAN DIFF?: NORMAL
MCHC RBC-ENTMCNC: 30.6 PG
MCHC RBC-ENTMCNC: 31.7 GM/DL
MCV RBC AUTO: 96.6 FL
MONOCYTES # BLD AUTO: 0.62 K/UL
MONOCYTES NFR BLD AUTO: 9.1 %
NEUTROPHILS # BLD AUTO: 4.74 K/UL
NEUTROPHILS NFR BLD AUTO: 69.5 %
PLATELET # BLD AUTO: 189 K/UL
RBC # BLD: 4.08 M/UL
RBC # FLD: 12.9 %
WBC # FLD AUTO: 6.82 K/UL

## 2020-05-19 NOTE — HISTORY OF PRESENT ILLNESS
[FreeTextEntry1] : Pt here for follow up\par [facility] [place]  [de-identified] : stable alert NAD\par no acute changes\par

## 2020-05-19 NOTE — PLAN
[FreeTextEntry1] : improved bp medications as directed.\par urology fu this summer \par fasting labs on follow up\par \par 3 mo sooner as needed.\par

## 2020-08-12 ENCOUNTER — APPOINTMENT (OUTPATIENT)
Dept: FAMILY MEDICINE | Facility: CLINIC | Age: 75
End: 2020-08-12
Payer: MEDICARE

## 2020-08-12 VITALS
WEIGHT: 140 LBS | DIASTOLIC BLOOD PRESSURE: 50 MMHG | RESPIRATION RATE: 16 BRPM | HEIGHT: 67 IN | BODY MASS INDEX: 21.97 KG/M2 | TEMPERATURE: 98 F | OXYGEN SATURATION: 98 % | HEART RATE: 60 BPM | SYSTOLIC BLOOD PRESSURE: 140 MMHG

## 2020-08-12 DIAGNOSIS — T63.441A TOXIC EFFECT OF VENOM OF BEES, ACCIDENTAL (UNINTENTIONAL), INITIAL ENCOUNTER: ICD-10-CM

## 2020-08-12 PROCEDURE — 99214 OFFICE O/P EST MOD 30 MIN: CPT | Mod: 25

## 2020-08-12 PROCEDURE — 96372 THER/PROPH/DIAG INJ SC/IM: CPT | Mod: 59

## 2020-08-12 RX ORDER — METHYLPREDNISOLONE 4 MG/1
4 TABLET ORAL
Qty: 1 | Refills: 0 | Status: ACTIVE | COMMUNITY
Start: 2020-08-12 | End: 1900-01-01

## 2020-08-12 RX ORDER — CHLORTHALIDONE 25 MG/1
25 TABLET ORAL
Refills: 0 | Status: ACTIVE | COMMUNITY

## 2020-08-12 RX ADMIN — DEXAMETHASONE SODIUM PHOSPHATE 1 MG/ML: 10 INJECTION INTRAMUSCULAR; INTRAVENOUS at 00:00

## 2020-08-13 RX ORDER — DEXAMETHASONE SODIUM PHOSPHATE 10 MG/ML
10 INJECTION INTRAMUSCULAR; INTRAVENOUS
Qty: 0 | Refills: 0 | Status: COMPLETED | OUTPATIENT
Start: 2020-08-12

## 2020-08-13 RX ADMIN — DEXAMETHASONE SODIUM PHOSPHATE 1 MG/10ML: 10 INJECTION, SOLUTION INTRAMUSCULAR; INTRAVENOUS at 00:00

## 2020-08-13 NOTE — HISTORY OF PRESENT ILLNESS
[FreeTextEntry8] : KAYLA states he was stung in four places by bees over a week and still is having a rash

## 2020-08-13 NOTE — PHYSICAL EXAM
[No Acute Distress] : no acute distress [Well-Appearing] : well-appearing [Well Nourished] : well nourished [Well Developed] : well developed [PERRL] : pupils equal round and reactive to light [Normal Sclera/Conjunctiva] : normal sclera/conjunctiva [Normal Outer Ear/Nose] : the outer ears and nose were normal in appearance [EOMI] : extraocular movements intact [No JVD] : no jugular venous distention [No Lymphadenopathy] : no lymphadenopathy [Supple] : supple [No Respiratory Distress] : no respiratory distress  [Clear to Auscultation] : lungs were clear to auscultation bilaterally [Thyroid Normal, No Nodules] : the thyroid was normal and there were no nodules present [No Accessory Muscle Use] : no accessory muscle use [Normal Rate] : normal rate  [Regular Rhythm] : with a regular rhythm [Normal S1, S2] : normal S1 and S2 [Soft] : abdomen soft [Non-distended] : non-distended [Non Tender] : non-tender [Normal Posterior Cervical Nodes] : no posterior cervical lymphadenopathy [Normal Anterior Cervical Nodes] : no anterior cervical lymphadenopathy [No Joint Swelling] : no joint swelling [Grossly Normal Strength/Tone] : grossly normal strength/tone [No Rash] : no rash [Normal Gait] : normal gait [No Focal Deficits] : no focal deficits [Coordination Grossly Intact] : coordination grossly intact [Normal Insight/Judgement] : insight and judgment were intact [Normal Affect] : the affect was normal [III] : a grade 3 [de-identified] : 2 bee stings each leg

## 2021-09-20 ENCOUNTER — EMERGENCY (EMERGENCY)
Facility: HOSPITAL | Age: 76
LOS: 1 days | End: 2021-09-20
Admitting: EMERGENCY MEDICINE
Payer: MEDICARE

## 2021-09-20 PROCEDURE — L9991: CPT

## 2021-10-19 ENCOUNTER — NON-APPOINTMENT (OUTPATIENT)
Age: 76
End: 2021-10-19

## 2021-11-11 ENCOUNTER — APPOINTMENT (OUTPATIENT)
Dept: OPHTHALMOLOGY | Facility: CLINIC | Age: 76
End: 2021-11-11
Payer: MEDICARE

## 2021-11-11 ENCOUNTER — NON-APPOINTMENT (OUTPATIENT)
Age: 76
End: 2021-11-11

## 2021-11-11 PROCEDURE — 92133 CPTRZD OPH DX IMG PST SGM ON: CPT

## 2021-11-11 PROCEDURE — 92083 EXTENDED VISUAL FIELD XM: CPT

## 2021-11-17 ENCOUNTER — APPOINTMENT (OUTPATIENT)
Dept: OPHTHALMOLOGY | Facility: CLINIC | Age: 76
End: 2021-11-17
Payer: MEDICARE

## 2021-11-17 ENCOUNTER — NON-APPOINTMENT (OUTPATIENT)
Age: 76
End: 2021-11-17

## 2021-11-17 PROCEDURE — 92134 CPTRZ OPH DX IMG PST SGM RTA: CPT

## 2021-11-17 PROCEDURE — 92014 COMPRE OPH EXAM EST PT 1/>: CPT

## 2022-05-06 ENCOUNTER — NON-APPOINTMENT (OUTPATIENT)
Age: 77
End: 2022-05-06

## 2022-05-06 ENCOUNTER — APPOINTMENT (OUTPATIENT)
Dept: OPHTHALMOLOGY | Facility: CLINIC | Age: 77
End: 2022-05-06
Payer: MEDICARE

## 2022-05-06 PROCEDURE — 92014 COMPRE OPH EXAM EST PT 1/>: CPT

## 2022-05-06 PROCEDURE — 92015 DETERMINE REFRACTIVE STATE: CPT

## 2022-10-13 ENCOUNTER — APPOINTMENT (OUTPATIENT)
Dept: OPHTHALMOLOGY | Facility: CLINIC | Age: 77
End: 2022-10-13

## 2022-10-13 ENCOUNTER — NON-APPOINTMENT (OUTPATIENT)
Age: 77
End: 2022-10-13

## 2022-10-13 PROCEDURE — 92133 CPTRZD OPH DX IMG PST SGM ON: CPT

## 2022-10-13 PROCEDURE — 92083 EXTENDED VISUAL FIELD XM: CPT

## 2022-10-21 ENCOUNTER — NON-APPOINTMENT (OUTPATIENT)
Age: 77
End: 2022-10-21

## 2022-10-21 ENCOUNTER — APPOINTMENT (OUTPATIENT)
Dept: OPHTHALMOLOGY | Facility: CLINIC | Age: 77
End: 2022-10-21

## 2022-10-21 PROCEDURE — 99214 OFFICE O/P EST MOD 30 MIN: CPT

## 2022-10-21 PROCEDURE — 92134 CPTRZ OPH DX IMG PST SGM RTA: CPT

## 2023-04-21 ENCOUNTER — NON-APPOINTMENT (OUTPATIENT)
Age: 78
End: 2023-04-21

## 2023-04-21 ENCOUNTER — APPOINTMENT (OUTPATIENT)
Dept: OPHTHALMOLOGY | Facility: CLINIC | Age: 78
End: 2023-04-21
Payer: MEDICARE

## 2023-04-21 PROCEDURE — 92014 COMPRE OPH EXAM EST PT 1/>: CPT

## 2023-04-21 PROCEDURE — 92134 CPTRZ OPH DX IMG PST SGM RTA: CPT

## 2023-08-21 NOTE — REVIEW OF SYSTEMS
Ms. Wallis is an 81-year-old female with past medical history of hypertension, GERD, Raynauds D, insomnia, osteoporosis, ankle surgery 1977, cholecystectomy 2020, ventral hernia repair 2022,- recent lumbar disc surgery on August 11, 2023, (for chronic intractable low back pain, underwent at [facility]) presented to ED due to complaints of nausea, abdominal pain, discomfort, constipation over past couple of days, which got worsened; At home she was taking Percocet q.4 hours.  She began to have abdominal discomfort and bowel distention and nausea and vomiting.  She is not had any bowel movements for the last 6 days.  Not sure if she was taking stool softners or not; denied fever, chills, chest pain, burning micturition.  Stated that she last passed flatus 3 days ago.  She presented to ED, CT scan was done with oral contrast and there was concern for a bowel obstruction versus a ileus.          Patient appeared alert and oriented, currently with NG tube at the time of examination, with approximately 600 cc output so far; general surgery consulted;     Code status-full code   Ambulates at home without assistance;         [Negative] : Heme/Lymph

## 2023-10-03 ENCOUNTER — NON-APPOINTMENT (OUTPATIENT)
Age: 78
End: 2023-10-03

## 2023-10-03 ENCOUNTER — APPOINTMENT (OUTPATIENT)
Dept: OPHTHALMOLOGY | Facility: CLINIC | Age: 78
End: 2023-10-03
Payer: MEDICARE

## 2023-10-03 PROCEDURE — 92083 EXTENDED VISUAL FIELD XM: CPT

## 2023-10-03 PROCEDURE — 92133 CPTRZD OPH DX IMG PST SGM ON: CPT

## 2023-10-13 ENCOUNTER — APPOINTMENT (OUTPATIENT)
Dept: OPHTHALMOLOGY | Facility: CLINIC | Age: 78
End: 2023-10-13
Payer: MEDICARE

## 2023-10-13 ENCOUNTER — NON-APPOINTMENT (OUTPATIENT)
Age: 78
End: 2023-10-13

## 2023-10-13 PROCEDURE — 92014 COMPRE OPH EXAM EST PT 1/>: CPT

## 2023-10-13 PROCEDURE — 92134 CPTRZ OPH DX IMG PST SGM RTA: CPT

## 2024-04-19 ENCOUNTER — NON-APPOINTMENT (OUTPATIENT)
Age: 79
End: 2024-04-19

## 2024-04-19 ENCOUNTER — APPOINTMENT (OUTPATIENT)
Dept: OPHTHALMOLOGY | Facility: CLINIC | Age: 79
End: 2024-04-19
Payer: MEDICARE

## 2024-04-19 PROCEDURE — 92134 CPTRZ OPH DX IMG PST SGM RTA: CPT

## 2024-04-19 PROCEDURE — 92014 COMPRE OPH EXAM EST PT 1/>: CPT

## 2024-10-15 ENCOUNTER — APPOINTMENT (OUTPATIENT)
Dept: OPHTHALMOLOGY | Facility: CLINIC | Age: 79
End: 2024-10-15

## 2024-10-15 PROCEDURE — 92133 CPTRZD OPH DX IMG PST SGM ON: CPT

## 2024-10-15 PROCEDURE — 92083 EXTENDED VISUAL FIELD XM: CPT

## 2024-10-29 ENCOUNTER — INPATIENT (INPATIENT)
Facility: HOSPITAL | Age: 79
LOS: 15 days | Discharge: HOME | DRG: 435 | End: 2024-11-14
Attending: STUDENT IN AN ORGANIZED HEALTH CARE EDUCATION/TRAINING PROGRAM | Admitting: INTERNAL MEDICINE
Payer: MEDICARE

## 2024-10-29 DIAGNOSIS — K92.2 GASTROINTESTINAL HEMORRHAGE, UNSPECIFIED: ICD-10-CM

## 2024-10-29 NOTE — PATIENT PROFILE ADULT - FUNCTIONAL ASSESSMENT - BASIC MOBILITY 6.
[facility]   Progress Note              Subjective:      No acute issues noted from overnight  Mentation much improved today  Denies any further burning on urination      14 point Review of systems is negative except for as noted above.     I/O's    Intake/Output Summary (Last 24 hours) at 5/5/2023 1529  Last data filed at 5/5/2023 1159  Gross per 24 hour   Intake 1553.39 ml   Output 1200 ml   Net 353.39 ml         ALLERGIES:  Lisinopril and Morphine     No data recorded  No data recorded           HOSPITAL MEDS  Current Facility-Administered Medications   Medication Dose Route Frequency Provider Last Rate Last Admin    cefdinir (OMNICEF) capsule 300 mg  300 mg Oral 2 times per day Jayro Bagley MD   300 mg at 05/05/23 1525    allopurinol (ZYLOPRIM) tablet 100 mg  100 mg Oral Daily Flower Hernández MD   100 mg at 05/05/23 0908    atorvastatin (LIPITOR) tablet 10 mg  10 mg Oral Q Evening Flower Hernández MD   10 mg at 05/04/23 2130    apixaBAN (ELIQUIS) tablet 5 mg  5 mg Oral BID Flower Hernández MD   5 mg at 05/05/23 0908    metoPROLOL succinate (TOPROL-XL) ER tablet 200 mg  200 mg Oral Daily Flower Hernández MD   200 mg at 05/05/23 0908    potassium CHLORIDE (KLOR-CON M) michael ER tablet 20 mEq  20 mEq Oral Daily Flower Hernández MD   20 mEq at 05/05/23 0908    budesonide-formoterol (SYMBICORT) 160-4.5 MCG/ACT inhaler 2 puff  2 puff Inhalation BID Resp Flower Hernández MD   2 puff at 05/05/23 0911    insulin lispro (ADMELOG,HumaLOG) - Correction Dose   Subcutaneous TID WC Flower Hernández MD   2 Units at 05/05/23 1247    sodium chloride (NORMAL SALINE) 0.9 % bolus 250 mL  250 mL Intravenous Once Juliette Whitehead CNP        metoPROLOL tartrate (LOPRESSOR) tablet 25 mg  25 mg Oral Once Juliette Whitehead CNP        calcium carbonate-vitamin D (CALTRATE+D) 600-10 MG-MCG tablet 1 tablet  1 tablet Oral Daily with breakfast Flower Hernández MD   1 tablet at 05/05/23 0908    sodium  chloride (PF) 0.9 % injection 2 mL  2 mL Intracatheter 2 times per day Alisha Nugent MD   2 mL at 05/05/23 0910       Current Facility-Administered Medications   Medication Dose Route Frequency Provider Last Rate Last Admin    sodium chloride 0.9% infusion   Intravenous Continuous Jayro Bagley MD 50 mL/hr at 05/05/23 0959 Rate Verify at 05/05/23 0959     Current Facility-Administered Medications   Medication Dose Route Frequency Provider Last Rate Last Admin    bisacodyl (DULCOLAX) suppository 10 mg  10 mg Rectal BID PRN Flower Heránndez MD        ondansetron (ZOFRAN) tablet 8 mg  8 mg Oral Q8H PRN Flower Hernández MD        sodium chloride (NORMAL SALINE) 0.9 % bolus 500 mL  500 mL Intravenous PRN Flower Hernández MD        dextrose 50 % injection 25 g  25 g Intravenous PRN Flower Hernández MD        dextrose 50 % injection 12.5 g  12.5 g Intravenous PRN Flower Hernández MD        glucagon (GLUCAGEN) injection 1 mg  1 mg Intramuscular PRN Flower Hernández MD        dextrose (GLUTOSE) 40 % gel 15 g  15 g Oral PRN Flower Hernández MD        dextrose (GLUTOSE) 40 % gel 30 g  30 g Oral PRN Flower Hernández MD        traMADol (ULTRAM) tablet 25 mg  25 mg Oral Q6H PRN Jayro Bagley MD   25 mg at 05/05/23 1247    sodium chloride 0.9 % flush bag 25 mL  25 mL Intravenous PRN Alisha Nugent MD        guaiFENesin 100 MG/5ML solution 200 mg  200 mg Oral Q4H PRN Flower Hernández MD        bisacodyl (DULCOLAX) EC tablet 5 mg  5 mg Oral Daily PRN Flower Hernández MD        simethicone (MYLICON) tablet 125 mg  125 mg Oral 4x Daily PRN Flower Hernández MD        hydrALAZINE (APRESOLINE) tablet 25 mg  25 mg Oral TID PRN Flower Hernández MD        melatonin tablet 3 mg  3 mg Oral Nightly PRN Flower Hernández MD        potassium CHLORIDE (KLOR-CON M) michael ER tablet 40 mEq  40 mEq Oral Q4H PRN Flower Hernández MD        acetaminophen (TYLENOL) tablet 650 mg  650 mg Oral Q4H PRN Flower Hernández MD   650 mg at 05/05/23 1524     ondansetron (ZOFRAN) injection 4 mg  4 mg Intravenous Q4H PRN Flower Hernández MD          Last Recorded Vitals        SpO2 Readings from Last 3 Encounters:   05/05/23 98%   04/28/23 98%   04/14/23 97%      VITAL SIGNS:     Vital Last Value 24 Hour Range   Temperature 98.8 °F (37.1 °C) (05/05/23 1200) Temp  Min: 97.7 °F (36.5 °C)  Max: 100.6 °F (38.1 °C)   Pulse 87 (05/05/23 1200) Pulse  Min: 70  Max: 124   Respiratory 16 (05/05/23 1347) Resp  Min: 16  Max: 20   Non-Invasive  Blood Pressure 115/75 (05/05/23 1200) BP  Min: 100/70  Max: 133/70   Pulse Oximetry 98 % (05/05/23 1200) SpO2  Min: 96 %  Max: 98 %   Arterial   Blood Pressure   No data recorded      Vital Today Admitted   Weight 86.4 kg (190 lb 7.6 oz) (05/05/23 0300) Weight: 103.4 kg (228 lb) (05/02/23 1859)   Height N/A Height: 5' 3\" (160 cm) (05/02/23 1859)   BMI N/A BMI (Calculated): 40.39 (05/02/23 1859)     Physical Exam:  General:  Alert, oriented x2  Eyes: no scleral icterus, no conjunctival erythema   Cardio: S1, S2, RRR, no murmur, rub, gallop or thrills noted.   Pulm: Lungs clear to auscultation bilaterally, no wheeze or rhonchi noted. No chest wall tenderness  GI: Soft, non-tender, nondistended. Normal bowel sounds auscultated x4 quadrants  : No suprapubic Tenderness, no CVA tenderness bilaterally  Ext: No upper or lower extremity edema noted. No cords palpated.   Musculoskeletal: 5/5 strength both upper and lower extremities. No joint tenderness or erythema.  Skin: No abnormal bruising or discoloration noted. No jaundice.   Psych: Appropriate mood and affect  Neuro: Pt appropriately follows commands.    Labs   Recent Labs     05/03/23  0656 05/04/23  0436 05/05/23  0435   WBC 12.1* 12.0* 8.8   RBC 3.03* 2.76* 2.70*   HGB 9.7* 8.7* 8.5*   HCT 29.4* 26.3* 26.5*    217 243   MCV 97.0 95.3 98.1   MCH 32.0 31.5 31.5   MCHC 33.0 33.1 32.1   NRBCRE 0 0 0         Recent Labs     05/02/23 2013 05/03/23  0656 05/04/23  0436 05/05/23  0435    SODIUM 133* 137 138 137   POTASSIUM 4.9 3.9 4.0 4.2   CO2 29 28 28 27   ANIONGAP 6* 7 7 6*   GLUCOSE 148* 143* 145* 122*   BUN 28* 30* 33* 33*   CREATININE 1.36* 1.41* 1.47* 1.36*   CALCIUM 9.4 9.4 9.6 9.0   BILIRUBIN 0.8 0.6 0.5  --    AST 45* 21 35  --    GPT 28 26 34  --    ALKPT 130* 127* 123*  --    GLOB 4.4* 4.2* 4.1*  --    AGR 0.7* 0.7* 0.6*  --         Recent Labs   Lab 05/05/23  0435 05/04/23  0436 05/03/23  0656 05/02/23 2013   SODIUM 137 138 137 133*   POTASSIUM 4.2 4.0 3.9 4.9   CHLORIDE 108 107 106 103   CO2 27 28 28 29   BUN 33* 33* 30* 28*   CREATININE 1.36* 1.47* 1.41* 1.36*   GLUCOSE 122* 145* 143* 148*   ALBUMIN  --  2.5* 2.8* 2.9*   AST  --  35 21 45*   BILIRUBIN  --  0.5 0.6 0.8       Recent Labs   Lab 05/04/23  0436   NTPROB 5,201*        Recent Labs   Lab 05/04/23  1646 05/04/23  1842 05/04/23  2125 05/05/23  0752 05/05/23  1140   GLUCOSE BEDSIDE 172* 121* 148* 105* 245*       Imaging      US KIDNEY BILATERAL   Final Result   Trace prominence the right renal pelvis with a right renal stent.  No   evidence of significant hydronephrosis.  Correlation with clinical history   to assess for presence of renal stent.      Electronically Signed by: SUZANNE SHAFFER MD    Signed on: 5/4/2023 5:48 PM    Workstation ID: [id number]      XR CHEST PA AND LATERAL 2 VIEWS   Final Result   Impression:       No acute cardiopulmonary process.      Electronically Signed by: SERJIO PAUL M.D.    Signed on: 5/2/2023 8:56 PM    Workstation ID: [id number]      CT HEAD WO CONTRAST   Final Result      No evidence of acute intracranial hemorrhage, hydrocephalus, or midline   shift.  Mild-to-moderate generalized atrophy.  Mild periventricular and   subcortical white matter chronic small vessel ischemic changes.  Old right   basal ganglia lacunar infarct.        Electronically Signed by: EVER MOISE MD    Signed on: 5/2/2023 8:55 PM    Workstation ID: [id number]        Cultures  Microbiology Results        None          All imaging, labs, vitals and related tests have been reviewed and interpreted by me.     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  -Currently on chemotherapy  -Oncology consult     History of atrial fibrillation on Eliquis  -Continue metoprolol succinate 200 mg daily, Eliquis  -Monitor on telemetry     Hyperlipidemia:  -Continue Lipitor 10 mg daily     History of gout:  -Resume home medications allopurinol 100 mg daily     Type 2 diabetes mellitus:  At home on metformin  Continue low-dose insulin sliding scale while hospitalized    DVT Prophylaxis:   Current Active Medications for DVT Prophylaxis (From admission, onward)           Stop     apixaBAN (ELIQUIS) tablet 5 mg  5 mg,   Oral,   2 TIMES DAILY         --                   Diet: Cardiac Diet  One Time Diet Cardiac; Pt Just Arrived From [place]. Please Send A Lunch Tray! Thanks!!  Baseline Activity: Ambulates Independently    CODE STATUS:   Code Status: Full Resuscitation    Physician Notification:  Consultants notified of patient via [employer].  Communication: with patient, nurse     Primary Care Physician  Jennyfer Prado, MD [facility]  5/5/2023 3:29 PM    4-calculated by average/Not able to assess (calculate score using [facility] averaging method)

## 2024-10-29 NOTE — PATIENT PROFILE ADULT - FUNCTIONAL ASSESSMENT - DAILY ACTIVITY ASSESSMENT TYPE
[facility] Hospital Medicine  Discharge Summary      Patient Name: Esthela Contreras  MRN: [id number]  [facility]: [id number]  Patient Class: IP- Inpatient  Admission Date: 3/13/2024  Hospital Length of Stay: 12 days  Discharge Date and Time:  03/26/2024 11:52 AM  Attending Physician: Tiffanie Ford MD   Discharging Provider: TIFFANIE FORD MD  Primary Care Provider: Yolande Spring FNP    Primary Care Team: Networked reference to record PCT     HPI:   Patient is a 74-year-old male with a history of combined CHF with last known EF of 35% with chronically elevated proBNP ranging from 4 to 20,000, COPD, type 2 diabetes, and chronic bilateral lower extremity edema coupled with venous stasis dermatitis which has been frequently complicated by cellulitis requiring hospitalization who presented to emergency room as instructed by his home health nurse for evaluation of bilateral lower extremity wound which appeared to have been infected again.  Patient complained of burning type of pain +7/10 involving bilateral lower extremities, but otherwise denied any fever chills cough wheezing shortness of breath chest pain palpitation PND or orthopnea in association.    On initial presentation, patient was tachycardic with a heart rate in the 120s but vital signs were otherwise stable and patient was afebrile.  Workup was notable for chest x-ray demonstrating right-sided pleural effusion with right basilar density representing either atelectasis versus developing infiltrate, chronically elevated troponin in the 100s without any ischemic abnormalities seen on EKG, chronically elevated proBNP along with leukocytosis with left shift.     Patient will be admitted with a working diagnosis of sepsis secondary to bilateral lower extremity cellulitis in the setting of chronic venous stasis dermatitis and type 2 MI associated with acute decompensation of chronic combined heart failure.        Procedure(s)  (LRB):  DEBRIDEMENT, LOWER EXTREMITY (Bilateral)      Hospital Course:   No notes on file     Goals of Care Treatment Preferences:  Code Status: Full Code      Consults:   Consults (From admission, onward)          Status Ordering Provider     Inpatient consult to Social Work  Once        Provider:  (Not yet assigned)    Completed TIFFANIE VENTURA     Inpatient consult to Social Work  Once        Provider:  (Not yet assigned)    Completed TIFFANIE VENTURA     Inpatient consult to Pulmonology  Once        Provider:  Eloy Dong MD    Completed TIFFANIE VENTURA     Inpatient consult to Cardiology  Once        Provider:  Rafy Juarez MD    Completed PEÑA BANDA     Pharmacy to dose Vancomycin consult  Once        Provider:  (Not yet assigned)    Completed JUDITH, MIN S     Inpatient consult to General Surgery  Once        Provider:  Kumar Spring MD    Completed AMANDA WONG            Pulmonary  COPD (chronic obstructive pulmonary disease)  Patient's COPD is controlled currently.    Patient is currently off COPD Pathway.   Continue PRN nebs, Supplemental oxygen and monitor respiratory status closely.     Pulmonary nodules/lesions, multiple  CTA chest showed interval development of a 1.3 cm solid pulmonary nodule located adjacent to the right major fissure. Additional new 1 cm ill-defined and solid pulmonary nodule located near the lingula.   Pulmonology consulted and recommend outpatient follow up.       Loculated pleural effusion  Patient found to have moderate pleural effusion on imaging.   I have personally reviewed and interpreted the following imaging: CT. A thoracentesis was deferred to Pulmonology.   Most likely etiology includes  heart failure .   Management to include  Pulmonology consultation to consider thoracentesis/sampling.   S/p thoracentesis (3/18), transudate fluid likely related to CHF, cultures NGTD.         Cardiac/Vascular  Non-sustained ventricular tachycardia  PVC's and episodes of  NSVT on 3/22 and 3/23.  Keep electrolytes in check.   Low dose beta blocker added.     A-fib  Patient with Paroxysmal (<7 days) atrial fibrillation which is controlled currently with beta blocker. Patient is currently in sinus rhythm.[id number] Score: 4. HASBLED Score: 2. Anticoagulation indicated. Anticoagulation with Eliquis.    Coronary artery disease involving native coronary artery of native heart without angina pectoris  Patient with known CAD s/p  unknown , which is controlled  Will continue ASA and Statin and monitor for S/Sx of angina/ACS.   Continue to monitor on telemetry.     Chronic HFrEF (heart failure with reduced ejection fraction)  Patient is identified as having Systolic (HFrEF) heart failure that is Acute on chronic. CHF is currently controlled. Latest ECHO performed and demonstrates- Results for orders placed during the hospital encounter of 03/13/24    Echo    Interpretation Summary    Left Ventricle: The left ventricle is mildly dilated. Mildly increased wall thickness. There is concentric hypertrophy. Severe global hypokinesis present. There is reduced systolic function. Ejection fraction by visual approximation is 20%.    Right Ventricle: Moderate right ventricular enlargement. Systolic function is hyperdynamic.    Left Atrium: Left atrium is mildly dilated.    Right Atrium: Right atrium is severely dilated.    Aortic Valve: The aortic valve is a trileaflet valve. Moderately calcified cusps.    Mitral Valve: There is mild bileaflet sclerosis. Mildly thickened leaflets. There is no stenosis. The mean pressure gradient across the mitral valve is 4 mmHg at a heart rate of  bpm. There is moderate regurgitation with an eccentric jet.    Tricuspid Valve: There is mild regurgitation with an eccentrically directed jet.    Pulmonary Artery: The estimated pulmonary artery systolic pressure is 58 mmHg.    IVC/SVC: Elevated venous pressure at 15 mmHg.    Pericardium: There is a trivial effusion. No  indication of cardiac tamponade.    Required dobutamine initially, now off- resume home midodrine.   Continue Furosemide, Toprol, hydralazine.   Cardiology has been consulted- outpatient follow up recommended. .   Continue to stress to patient importance of self efficacy and  on diet for CHF.     Renal/  CHANDNI (acute kidney injury)  Patient with acute kidney injury/acute renal failure likely due to pre-renal azotemia due to IVVD CHANDNI is currently stable.   Baseline creatinine  - Labs reviewed- Renal function/electrolytes with Estimated Creatinine Clearance: 40.2 mL/min (A) (based on SCr of 1.64 mg/dL (H)). according to latest data.   Likely due to cardio-renal syndrome and diuretics, continue diuretics and allow permissive azotemia given risks/benefits.   Stable Cr.   Monitor urine output and serial BMP and adjust therapy as needed.   Avoid nephrotoxins and renally dose meds for GFR listed above.    ID  * Sepsis due to cellulitis  This patient does have evidence of infective focus  My overall impression is sepsis.  Source: Skin and Soft Tissue (location lower extremities with wounds)  Antibiotics given-   Antibiotics (72h ago, onward)      Start     Stop Route Frequency Ordered    03/20/24 1015  ciprofloxacin HCl tablet 500 mg         03/27/24 0859 Oral Every 12 hours 03/20/24 0902    03/20/24 1015  linezolid tablet 600 mg         03/27/24 0859 Oral Every 12 hours 03/20/24 0902    03/20/24 0900  silver sulfADIAZINE 1% cream         -- Top Daily 03/19/24 1224          Source control achieved by: antibiotics, local wound care   In the past, wound grew pseudomonas and acinetobacter.  Current wound cultures with pseudomonas and MRSA.   Unfortunately patient is a poor candidate for non-emergent surgical debridement at this time due to heart failure and pulmonary embolism with infarct.   S/p 7 days of IV vancomycin and Zosyn, transitioned to PO ciprofloxacin and linezolid x 7 days.    Continue local wound care and  "home health on discharge.       Cellulitis of lower extremity  Plan as outlined above.       Positive blood culture  Blood culture #1 of 2 positive - Verigene MRSE and Strep species.   Possible contaminant as only one sample with growth to date.   Continue antibiotics as above.       Hematology  Pulmonary embolus with infarction  CT PE: Occlusive pulmonary embolus within the branch of the right pulmonary artery extending into the right lower lobe.  Infarct located within the right lower lobe.  S/p WB Lovenox, now transitioned to Eliquis (loading plus maintenance dose).            Oncology  Chronic anemia  Patient's anemia is currently controlled. Has not received any PRBCs to date. Etiology likely d/t chronic disease due to Chronic Kidney Disease  Current CBC reviewed-   Lab Results   Component Value Date    HGB 8.9 (L) 03/25/2024    HCT 32.1 (L) 03/25/2024     Monitor serial CBC and transfuse if patient becomes hemodynamically unstable, symptomatic or H/H drops below 7/21.    Endocrine  Type 2 diabetes mellitus  Patient's FSGs are controlled on current medication regimen.  Last A1c reviewed-   Lab Results   Component Value Date    HGBA1C 4.8 07/28/2023     Most recent fingerstick glucose reviewed- No results for input(s): "POCTGLUCOSE" in the last 24 hours.  Current correctional scale  Low  Maintain anti-hyperglycemic dose as follows-   Antihyperglycemics (From admission, onward)      Start     Stop Route Frequency Ordered    03/14/24 0520  insulin aspart U-100 injection 0-5 Units         -- SubQ Before meals & nightly PRN 03/14/24 0424          Hold Oral hypoglycemics while patient is in the hospital.      Final Active Diagnoses:    Diagnosis Date Noted POA    PRINCIPAL PROBLEM:  Sepsis due to cellulitis [L03.90, A41.9] 06/01/2023 Yes    Pulmonary embolus with infarction [I26.99] 03/15/2024 Yes    Chronic HFrEF (heart failure with reduced ejection fraction) [I50.22] 10/12/2021 Yes    Loculated pleural effusion " [J90] 03/17/2024 Yes    CHANDNI (acute kidney injury) [N17.9] 06/01/2023 Yes    Pulmonary nodules/lesions, multiple [R91.8] 03/17/2024 Yes    Non-sustained ventricular tachycardia [I47.29] 03/23/2024 No    Chronic anemia [D64.9] 03/18/2024 Yes    Positive blood culture [R78.81] 03/16/2024 Yes    Cellulitis of lower extremity [L03.119] 03/16/2024 Yes    A-fib [I48.91] 07/28/2023 Yes    Type 2 diabetes mellitus [E11.9] 06/09/2023 Yes    COPD (chronic obstructive pulmonary disease) [J44.9] 06/02/2023 Yes    Coronary artery disease involving native coronary artery of native heart without angina pectoris [I25.10] 10/12/2021 Yes      Problems Resolved During this Admission:       Discharged Condition: fair    Disposition: Home-Health Care [facility]    Follow Up:   Follow-up Information       Meera Morley FNP Follow up in 2 day(s).    Specialty: Cardiology  Why: Schedule follow up with CHAU Morley in 2 weeks; Hospital discharge, seen inpatient by Dr. Cobos  Contact information:  [address] [place] [address] [facility]  [place] [id number]  [phone]               [facility] Follow up.    Specialty: Wound Care  Why: For wound re-check  Contact information:  [address] [facility] [address] [phone]             Yolande Spring FNP. Schedule an appointment as soon as possible for a visit in 1 week(s).    Specialty: Family Medicine  Contact information:  [address]  Internal Medicine Clinic  [facility] [address]  [phone]               Eloy Dong MD. Schedule an appointment as soon as possible for a visit in 4 week(s).    Specialties: Pulmonary Disease, Critical Care Medicine  Contact information:  [address] [place], [place] [id number]  [phone]                           Patient Instructions:      ACCEPT - Ambulatory referral/consult to Cardiology   Standing Status: Future   Referral Priority: Routine Referral Type: Consultation   Referral  Reason: Specialty Services Required   Requested Specialty: Cardiology   Number of Visits Requested: 1       Significant Diagnostic Studies: Labs: BMP:   Recent Labs   Lab 03/25/24  0904   *      K 4.2      CO2 23   BUN 31*   CREATININE 1.64*   CALCIUM 8.4*    and CBC   Recent Labs   Lab 03/25/24  0904   WBC 6.45   HGB 8.9*   HCT 32.1*          Pending Diagnostic Studies:       Procedure Component Value Units Date/Time    EKG 12-lead [[id number]]     Order Status: Sent Lab Status: No result     EXTRA TUBES [[id number]] Collected: 03/15/24 2351    Order Status: Sent Lab Status: In process Updated: 03/15/24 2357    Specimen: Blood, Venous     Narrative:      The following orders were created for panel order EXTRA TUBES.  Procedure                               Abnormality         Status                     ---------                               -----------         ------                     Lavender Top Hold[[id number]]                               In process                   Please view results for these tests on the individual orders.    EXTRA TUBES [[id number]] Collected: 03/15/24 1139    Order Status: Sent Lab Status: In process Updated: 03/15/24 1139    Specimen: Blood, Venous     Narrative:      The following orders were created for panel order EXTRA TUBES.  Procedure                               Abnormality         Status                     ---------                               -----------         ------                     Light Blue Top Hold[[id number]]                             In process                   Please view results for these tests on the individual orders.           Medications:  Reconciled Home Medications:      Medication List        START taking these medications      aspirin 81 MG EC tablet  Commonly known as: ECOTRIN  Take 1 tablet (81 mg total) by mouth once daily.  Start taking on: March 27, 2024     ciprofloxacin HCl 500 MG tablet  Commonly known as:  CIPRO  Take 1 tablet (500 mg total) by mouth every 12 (twelve) hours. for 2 days     furosemide 40 MG tablet  Commonly known as: LASIX  Take 1 tablet (40 mg total) by mouth once daily.  Start taking on: March 27, 2024     gabapentin 100 MG capsule  Commonly known as: NEURONTIN  Take 1 capsule (100 mg total) by mouth 3 (three) times daily.     hydrALAZINE 10 MG tablet  Commonly known as: APRESOLINE  Take 1 tablet (10 mg total) by mouth every 12 (twelve) hours.     HYDROcodone-acetaminophen 5-325 mg per tablet  Commonly known as: NORCO  Take 1 tablet by mouth every 6 (six) hours as needed for Pain.     linezolid 600 mg Tab  Commonly known as: ZYVOX  Take 1 tablet (600 mg total) by mouth every 12 (twelve) hours. for 2 days            CHANGE how you take these medications      metoprolol succinate 25 MG 24 hr tablet  Commonly known as: TOPROL-XL  Take 0.5 tablets (12.5 mg total) by mouth once daily.  Start taking on: March 27, 2024  What changed: how much to take            CONTINUE taking these medications      acetaminophen 325 MG tablet  Commonly known as: TYLENOL  Take 2 tablets (650 mg total) by mouth every 6 (six) hours as needed for Pain or Temperature greater than.     apixaban 5 mg Tab  Commonly known as: ELIQUIS  Take 1 tablet (5 mg total) by mouth 2 (two) times daily.     atorvastatin 40 MG tablet  Commonly known as: LIPITOR  Take 1 tablet (40 mg total) by mouth once daily.     fluticasone-salmeterol 230-21 mcg/dose 230-21 mcg/actuation Hfaa inhaler  Commonly known as: ADVAIR HFA  Inhale 2 puffs into the lungs 2 (two) times daily. Controller     ipratropium-albuteroL  mcg/actuation inhaler  Commonly known as: CombiVENT  Inhale 1 puff into the lungs every 6 (six) hours as needed for Wheezing. Rescue     midodrine 5 MG Tab  Commonly known as: PROAMATINE  Take 1 tablet (5 mg total) by mouth 3 (three) times daily with meals.     mirtazapine 15 MG tablet  Commonly known as: REMERON  Take 1 tablet (15 mg  total) by mouth every evening.     polyethylene glycol 17 gram Pwpk  Commonly known as: GLYCOLAX  Take 17 g by mouth once daily.     SPIRIVA RESPIMAT 2.5 mcg/actuation inhaler  Generic drug: tiotropium bromide  Inhale 2 puffs into the lungs Daily. Controller              Indwelling Lines/Drains at time of discharge:   Lines/Drains/Airways       Peripherally Inserted Central Catheter Line  Duration             PICC Double Lumen 03/14/24 1126 left brachial 12 days                    Time spent on the discharge of patient: 42 minutes         TIFFANIE VENTURA MD  Department of Hospital Medicine  [facility]   Admission

## 2024-10-29 NOTE — PATIENT PROFILE ADULT - FALL HARM RISK - UNIVERSAL INTERVENTIONS
Bed in lowest position, wheels locked, appropriate side rails in place/Call bell, personal items and telephone in reach/Instruct patient to call for assistance before getting out of bed or chair/Non-slip footwear when patient is out of bed/[place] to call system/Physically safe environment - no spills, clutter or unnecessary equipment/Purposeful Proactive Rounding/Room/bathroom lighting operational, light cord in reach

## 2024-10-29 NOTE — PATIENT PROFILE ADULT - PATIENT'S SEXUAL ORIENTATION
Heterosexual
Detail Level: Simple
Plan: Patient to observe for cyclical flares of her acne
Continue Regimen: Panoxyl QAM and Salicylic Acid Cleanser QHS

## 2024-10-30 VITALS
OXYGEN SATURATION: 99 % | SYSTOLIC BLOOD PRESSURE: 114 MMHG | DIASTOLIC BLOOD PRESSURE: 63 MMHG | HEART RATE: 78 BPM | WEIGHT: 127.87 LBS | TEMPERATURE: 98 F | RESPIRATION RATE: 18 BRPM

## 2024-10-30 DIAGNOSIS — Z98.890 OTHER SPECIFIED POSTPROCEDURAL STATES: Chronic | ICD-10-CM

## 2024-10-30 DIAGNOSIS — K21.9 GASTRO-ESOPHAGEAL REFLUX DISEASE WITHOUT ESOPHAGITIS: Chronic | ICD-10-CM

## 2024-10-30 LAB
ALBUMIN SERPL ELPH-MCNC: 3.7 G/DL — SIGNIFICANT CHANGE UP (ref 3.3–5.2)
ALP SERPL-CCNC: 70 U/L — SIGNIFICANT CHANGE UP (ref 40–120)
ALT FLD-CCNC: 12 U/L — SIGNIFICANT CHANGE UP
ANION GAP SERPL CALC-SCNC: 12 MMOL/L — SIGNIFICANT CHANGE UP (ref 5–17)
APTT BLD: 46.9 SEC — HIGH (ref 24.5–35.6)
AST SERPL-CCNC: 19 U/L — SIGNIFICANT CHANGE UP
BASOPHILS # BLD AUTO: 0.07 K/UL — SIGNIFICANT CHANGE UP (ref 0–0.2)
BASOPHILS NFR BLD AUTO: 0.8 % — SIGNIFICANT CHANGE UP (ref 0–2)
BILIRUB SERPL-MCNC: 0.5 MG/DL — SIGNIFICANT CHANGE UP (ref 0.4–2)
BLD GP AB SCN SERPL QL: SIGNIFICANT CHANGE UP
BUN SERPL-MCNC: 22.9 MG/DL — HIGH (ref 8–20)
CALCIUM SERPL-MCNC: 8.7 MG/DL — SIGNIFICANT CHANGE UP (ref 8.4–10.5)
CHLORIDE SERPL-SCNC: 104 MMOL/L — SIGNIFICANT CHANGE UP (ref 96–108)
CO2 SERPL-SCNC: 22 MMOL/L — SIGNIFICANT CHANGE UP (ref 22–29)
CREAT SERPL-MCNC: 1.13 MG/DL — SIGNIFICANT CHANGE UP (ref 0.5–1.3)
EGFR: 66 ML/MIN/1.73M2 — SIGNIFICANT CHANGE UP
EOSINOPHIL # BLD AUTO: 0.27 K/UL — SIGNIFICANT CHANGE UP (ref 0–0.5)
EOSINOPHIL NFR BLD AUTO: 3 % — SIGNIFICANT CHANGE UP (ref 0–6)
GLUCOSE SERPL-MCNC: 111 MG/DL — HIGH (ref 70–99)
HCT VFR BLD CALC: 27.5 % — LOW (ref 39–50)
HGB BLD-MCNC: 9.3 G/DL — LOW (ref 13–17)
IMM GRANULOCYTES NFR BLD AUTO: 0.4 % — SIGNIFICANT CHANGE UP (ref 0–0.9)
INR BLD: 1.28 RATIO — HIGH (ref 0.85–1.16)
LYMPHOCYTES # BLD AUTO: 0.91 K/UL — LOW (ref 1–3.3)
LYMPHOCYTES # BLD AUTO: 10 % — LOW (ref 13–44)
MAGNESIUM SERPL-MCNC: 1.9 MG/DL — SIGNIFICANT CHANGE UP (ref 1.6–2.6)
MCHC RBC-ENTMCNC: 29.5 PG — SIGNIFICANT CHANGE UP (ref 27–34)
MCHC RBC-ENTMCNC: 33.8 G/DL — SIGNIFICANT CHANGE UP (ref 32–36)
MCV RBC AUTO: 87.3 FL — SIGNIFICANT CHANGE UP (ref 80–100)
MONOCYTES # BLD AUTO: 0.84 K/UL — SIGNIFICANT CHANGE UP (ref 0–0.9)
MONOCYTES NFR BLD AUTO: 9.2 % — SIGNIFICANT CHANGE UP (ref 2–14)
MRSA PCR RESULT.: SIGNIFICANT CHANGE UP
NEUTROPHILS # BLD AUTO: 6.97 K/UL — SIGNIFICANT CHANGE UP (ref 1.8–7.4)
NEUTROPHILS NFR BLD AUTO: 76.6 % — SIGNIFICANT CHANGE UP (ref 43–77)
PLATELET # BLD AUTO: 117 K/UL — LOW (ref 150–400)
POTASSIUM SERPL-MCNC: 4.4 MMOL/L — SIGNIFICANT CHANGE UP (ref 3.5–5.3)
POTASSIUM SERPL-SCNC: 4.4 MMOL/L — SIGNIFICANT CHANGE UP (ref 3.5–5.3)
PROT SERPL-MCNC: 5.7 G/DL — LOW (ref 6.6–8.7)
PROTHROM AB SERPL-ACNC: 14.4 SEC — HIGH (ref 9.9–13.4)
RBC # BLD: 3.15 M/UL — LOW (ref 4.2–5.8)
RBC # FLD: 15.9 % — HIGH (ref 10.3–14.5)
S AUREUS DNA NOSE QL NAA+PROBE: SIGNIFICANT CHANGE UP
SODIUM SERPL-SCNC: 138 MMOL/L — SIGNIFICANT CHANGE UP (ref 135–145)
WBC # BLD: 9.1 K/UL — SIGNIFICANT CHANGE UP (ref 3.8–10.5)
WBC # FLD AUTO: 9.1 K/UL — SIGNIFICANT CHANGE UP (ref 3.8–10.5)

## 2024-10-30 PROCEDURE — 99223 1ST HOSP IP/OBS HIGH 75: CPT

## 2024-10-30 PROCEDURE — 99497 ADVNCD CARE PLAN 30 MIN: CPT | Mod: 25

## 2024-10-30 PROCEDURE — 99222 1ST HOSP IP/OBS MODERATE 55: CPT

## 2024-10-30 RX ORDER — DICYCLOMINE HYDROCHLORIDE 20 MG/1
1 TABLET ORAL
Refills: 0 | DISCHARGE

## 2024-10-30 RX ORDER — TIMOLOL MALEATE 0.5 %
1 DROPS OPHTHALMIC (EYE) AT BEDTIME
Refills: 0 | Status: DISCONTINUED | OUTPATIENT
Start: 2024-10-30 | End: 2024-11-14

## 2024-10-30 RX ORDER — TIMOLOL MALEATE 0.5 %
1 DROPS OPHTHALMIC (EYE)
Refills: 0 | DISCHARGE

## 2024-10-30 RX ORDER — ROSUVASTATIN CALCIUM 10 MG
1 TABLET ORAL
Refills: 0 | DISCHARGE

## 2024-10-30 RX ORDER — CARVEDILOL 25 MG/1
1 TABLET, FILM COATED ORAL
Refills: 0 | DISCHARGE

## 2024-10-30 RX ORDER — ONDANSETRON HYDROCHLORIDE 2 MG/ML
4 INJECTION, SOLUTION INTRAMUSCULAR; INTRAVENOUS EVERY 6 HOURS
Refills: 0 | Status: DISCONTINUED | OUTPATIENT
Start: 2024-10-30 | End: 2024-11-07

## 2024-10-30 RX ORDER — ACETAMINOPHEN 500 MG
650 TABLET ORAL EVERY 6 HOURS
Refills: 0 | Status: DISCONTINUED | OUTPATIENT
Start: 2024-10-30 | End: 2024-11-14

## 2024-10-30 RX ORDER — PANTOPRAZOLE SODIUM 40 MG/1
8 TABLET, DELAYED RELEASE ORAL
Qty: 80 | Refills: 0 | Status: DISCONTINUED | OUTPATIENT
Start: 2024-10-30 | End: 2024-11-11

## 2024-10-30 RX ORDER — CARVEDILOL 25 MG/1
6.25 TABLET, FILM COATED ORAL EVERY 12 HOURS
Refills: 0 | Status: DISCONTINUED | OUTPATIENT
Start: 2024-10-30 | End: 2024-11-09

## 2024-10-30 RX ORDER — ROSUVASTATIN CALCIUM 10 MG
10 TABLET ORAL AT BEDTIME
Refills: 0 | Status: DISCONTINUED | OUTPATIENT
Start: 2024-10-30 | End: 2024-11-14

## 2024-10-30 RX ORDER — MELATONIN 5 MG
3 TABLET ORAL AT BEDTIME
Refills: 0 | Status: DISCONTINUED | OUTPATIENT
Start: 2024-10-30 | End: 2024-11-14

## 2024-10-30 RX ADMIN — PANTOPRAZOLE SODIUM 10 MG/HR: 40 TABLET, DELAYED RELEASE ORAL at 17:18

## 2024-10-30 RX ADMIN — CARVEDILOL 6.25 MILLIGRAM(S): 25 TABLET, FILM COATED ORAL at 05:40

## 2024-10-30 RX ADMIN — Medication 10 MILLIGRAM(S): at 23:03

## 2024-10-30 RX ADMIN — CARVEDILOL 6.25 MILLIGRAM(S): 25 TABLET, FILM COATED ORAL at 17:18

## 2024-10-30 RX ADMIN — PANTOPRAZOLE SODIUM 10 MG/HR: 40 TABLET, DELAYED RELEASE ORAL at 02:00

## 2024-10-30 RX ADMIN — Medication 1 DROP(S): at 23:03

## 2024-10-30 NOTE — H&P ADULT - NSICDXPASTMEDICALHX_GEN_ALL_CORE_FT
PAST MEDICAL HISTORY:  CAD (coronary artery disease)     Duodenal ulcer     GERD (gastroesophageal reflux disease)     Glaucoma     Hypertension     Pancreatic mass

## 2024-10-30 NOTE — PROGRESS NOTE ADULT - SUBJECTIVE AND OBJECTIVE BOX
This is an 80 yo F who follows Dr Foster/JAEL for a history of VICTORIA S/P IV iron  last Hb in the office on 10/8 was 13.1 Has received IV iron in the past   [place]ed [place] [facility] [place]27 with few days of hematemesis and melena and was found to have anemia with Hgb of 7.4, s/p CT A/P at [facility] showing enlarged heterogenous pancreatic head, CBD dilation, duodenal thickening, gastric distention w/ liver hypodensities, all suspicious for neoplasm, being transferred to [facility] for advanced GI and surgical oncology eval    PAST MEDICAL & SURGICAL HISTORY:  CAD (coronary artery disease)  Hypertension  Glaucoma  Pancreatic mass  Duodenal ulcer  GERD (gastroesophageal reflux disease)  S/P inguinal hernia repair  H/O aortic valve replacement    Allergies  No Known Allergies  Intolerances  Lipitor (Other)    MEDICATIONS  (STANDING):  carvedilol 6.25 milliGRAM(s) Oral every 12 hours  pantoprazole Infusion 8 mG/Hr (10 mL/Hr) IV Continuous <Continuous>  rosuvastatin 10 milliGRAM(s) Oral at bedtime  timolol 0.5% Solution 1 Drop(s) Both EYES at bedtime    MEDICATIONS  (PRN):  acetaminophen     Tablet .. 650 milliGRAM(s) Oral every 6 hours PRN Temp greater or equal to 38C (100.4F), Mild Pain (1 - 3)  melatonin 3 milliGRAM(s) Oral at bedtime PRN Insomnia  ondansetron Injectable 4 milliGRAM(s) IV Push every 6 hours PRN Nausea and/or Vomiting    Vital Signs Last 24 Hrs  T(C): 36.6 (30 Oct 2024 08:35), Max: 36.6 (30 Oct 2024 05:05)  T(F): 97.9 (30 Oct 2024 08:35), Max: 97.9 (30 Oct 2024 05:05)  HR: 76 (30 Oct 2024 08:35) (76 - 79)  BP: 137/71 (30 Oct 2024 08:35) (114/63 - 137/71)  BP(mean): --  RR: 18 (30 Oct 2024 08:35) (18 - 18)  SpO2: 97% (30 Oct 2024 08:35) (97% - 99%)    Parameters below as of 30 Oct 2024 08:35  Patient On (Oxygen Delivery Method): room air    GENERAL:  Well-appearing elderly male, not in acute distress  EYES:  Clear conjunctiva, extraocular movement intact  ENT: Moist mucous membranes  RESP:   lungs clear to ausculation   CV: Regular rate and rhythm  GI: Soft, non-tender, non-distended  NEURO: Awake, alert, conversant   PSYCH: Calm, cooperative  SKIN: No rash or lesions, warm and dry    CBC                        9.3    9.10  )-----------( 117      ( 30 Oct 2024 02:00 )             27.5     CHEM    10-30    138  |  104  |  22.9[H]  ----------------------------<  111[H]  4.4   |  22.0  |  1.13    Ca    8.7      30 Oct 2024 02:00  Mg     1.9     10-30    TPro  5.7[L]  /  Alb  3.7  /  TBili  0.5  /  DBili  x   /  AST  19  /  ALT  12  /  AlkPhos  70  10-30

## 2024-10-30 NOTE — H&P ADULT - NSHPLABSRESULTS_GEN_ALL_CORE
10-30    138  |  104  |  22.9[H]  ----------------------------<  111[H]  4.4   |  22.0  |  1.13    Ca    8.7      30 Oct 2024 02:00  Mg     1.9     10-30    TPro  5.7[L]  /  Alb  3.7  /  TBili  0.5  /  DBili  x   /  AST  19  /  ALT  12  /  AlkPhos  70  10-30                            9.3    9.10  )-----------( 117      ( 30 Oct 2024 02:00 )             27.5

## 2024-10-30 NOTE — H&P ADULT - HISTORY OF PRESENT ILLNESS
79yoM hx CAD, HTN, CHF, unspecified, glaucoma who [place] [facility] [place] with few days of hematemesis and melena and was found to have anemia with Hgb of 7.4. CT A/P [place] [facility] showed enlarged heterogenous pancreatic head, CBD dilation, duodenal thickening, gastric distention w/ liver hypodensities, all suspicious for neoplasm.  Pt received 2pRBC was started on PPI drip and ASA was held.  He was also found to have CJ, hyponatremia and his spironolactone and lisinopril was held.  Pt seen by GI, underwent EGD on 10/28 that showed duodenal bulb ulcer.  Decision was made to transfer pt to [facility] for advanced GI evaluation as pt thought to benefit from possible duodenal stent and also for surgical oncology evaluation. 79yoM hx CAD, HTN, glaucoma who presented to [facility] on 10/27 with few days of hematemesis and melena and was found to have anemia with Hgb of 7.4. CT A/P at [facility] showed enlarged heterogenous pancreatic head, CBD dilation, duodenal thickening, gastric distention w/ liver hypodensities, all suspicious for neoplasm.  Pt received 2pRBC was started on PPI drip and ASA was held.  He was also found to have CJ, hyponatremia and his spironolactone and lisinopril was held.  Pt seen by GI, underwent EGD on 10/28 that showed duodenal bulb ulcer.  Decision was made to transfer pt to [facility] for advanced GI evaluation as pt thought to benefit from possible duodenal stent and also for surgical oncology evaluation.

## 2024-10-30 NOTE — H&P ADULT - NSHPPHYSICALEXAM_GEN_ALL_CORE
Vital Signs Last 24 Hrs  T(C): 36.5 (30 Oct 2024 00:05), Max: 36.5 (30 Oct 2024 00:05)  T(F): 97.7 (30 Oct 2024 00:05), Max: 97.7 (30 Oct 2024 00:05)  HR: 78 (30 Oct 2024 00:05) (78 - 78)  BP: 114/63 (30 Oct 2024 00:05) (114/63 - 114/63)  BP(mean): --  RR: 18 (30 Oct 2024 00:05) (18 - 18)  SpO2: 99% (30 Oct 2024 00:05) (99% - 99%)    Parameters below as of 30 Oct 2024 00:05  Patient On (Oxygen Delivery Method): room air    GENERAL:  Well-appearing elderly male, not in acute distress  EYES:  Clear conjunctiva, extraocular movement intact  ENT: Moist mucous membranes  RESP:  Non-labored breathing pattern, lungs clear to ausculation in anterior fields  CV: Regular rate and rhythm, no murmurs appreciated, no lower extremity edema  GI: Soft, non-tender, non-distended  NEURO: Awake, alert, conversant, upper and lower extremity strength 5/5, light touch sensation grossly intact  PSYCH: Calm, cooperative  SKIN: No rash or lesions, warm and dry

## 2024-10-30 NOTE — H&P ADULT - ASSESSMENT
79yoM hx CAD, HTN, CHF, unspecified, glaucoma who [place] [facility] few days of hematemesis and melena and was found to have anemia with Hgb of 7.4, s/p CT A/[place] [facility] showing enlarged heterogenous pancreatic head, CBD dilation, duodenal thickening, gastric distention w/ liver hypodensities, all suspicious for neoplasm, being transferred to [facility] for advanced GI and surgical oncology eval    Acute blood loss anemia due to GI bleeding from duodenal ulcer  -EGD on 10/28 at [facility] showed single ulcer at duodenal bulb  -s/p 2pRBC for Hgb of 7.4, w/ response, Hgb now in 9 range  -Continue PPI drip  -Has 2 large bore IV  -NPO except meds pending GI evaluation, possible plan for duodenal stent  -GI consulted for AM    Pancreatic mass  -Concern for pancreatic malignancy   -Ca 19-9 markedly elevated, >3K  -MRI abdomen had been recommended by oncology at [facility], will order  -Spoke w/ surgical resident Ryan overnight, requesting surg-onc consult placed in AM  -Medical oncology consulted for AM    CJ, hyponatremia  -Na 127, Cr 1.7 at [facility], s/p IVF and pRBC  -Now resolved, monitor     Hx CAD, HTN  -ASA on hold due to anemia, GI bleeding  -Lisinopril and spironolactone were hold due to CJ, hyponatremia   -Will continue to hold as normotensive and pt NPO    Hx glaucoma  -Timolol eye drops    Prophylactic measure  -No pharmacologic AC due to GI bleeding  -Intermittent pneumatic compressions 79yoM hx CAD, HTN, glaucoma who [place] [facility] [place] few days of hematemesis and melena and was found to have anemia with Hgb of 7.4, s/p CT A/[place] [facility] [place] showing enlarged heterogenous pancreatic head, CBD dilation, duodenal thickening, gastric distention w/ liver hypodensities, all suspicious for neoplasm, being transferred to [facility] for advanced GI and surgical oncology eval    Acute blood loss anemia due to GI bleeding from duodenal ulcer  -EGD on 10/28 at [facility] [place] showed single ulcer at duodenal bulb  -s/p 2pRBC for Hgb of 7.4, w/ response, Hgb now in 9 range  -Continue PPI drip  -Has 2 large bore IV  -NPO except meds pending GI evaluation, possible plan for duodenal stent  -GI consulted for AM    Pancreatic mass  -Concern for pancreatic malignancy   -Ca 19-9 markedly elevated, >3K  -MRI abdomen had been recommended by oncology at [facility] [place], will order  -Spoke w/ surgical resident Ryan overnight, requesting surg-onc consult placed in AM  -Medical oncology consulted for AM    CJ, hyponatremia  -Na 127, Cr 1.7 at [facility] [place], s/p IVF and pRBC  -Now resolved, monitor     Hx CAD, HTN  -ASA on hold due to anemia, GI bleeding  -Lisinopril and spironolactone were hold due to CJ, hyponatremia   -Will continue to hold as normotensive and pt NPO    Hx glaucoma  -Timolol eye drops    Prophylactic measure  -No pharmacologic AC due to GI bleeding  -Intermittent pneumatic compressions

## 2024-10-30 NOTE — PROGRESS NOTE ADULT - ASSESSMENT
79yoM hx CAD, HTN, glaucoma who presented to [facility] [place] on 10/27 with few days of hematemesis and melena and was found to have anemia with Hgb of 7.4, s/p CT A/P at [facility] [place] showing enlarged heterogenous pancreatic head, CBD dilation, duodenal thickening, gastric distention w/ liver hypodensities, all suspicious for neoplasm, being transferred to [facility] for advanced GI and surgical oncology eval    *Acute blood loss anemia due to GI bleeding from duodenal ulcer  -EGD on 10/28 at [facility] [place] showed single ulcer at duodenal bulb  -s/p 2pRBC for Hgb of 7.4, w/ response, Hgb now in 9 range  -Continue PPI drip  - clear liquid diet per GI   -GI consult appreciate it     Pancreatic mass  -Concern for pancreatic malignancy   -Ca 19-9 markedly elevated, >3K  -MRI abdomen had been recommended by oncology at [facility] [place], will order  -Surg-onc consult called   -Medical oncology consult     CJ, hyponatremia  -Na 127, Cr 1.7 at [facility] [place], s/p IVF and pRBC  -Now resolved, monitor     Hx CAD, HTN  -ASA on hold due to anemia, GI bleeding  -Lisinopril and spironolactone were hold due to CJ, hyponatremia   -Will continue to hold as normotensive and pt NPO    Hx glaucoma  -Timolol eye drops    Prophylactic measure  -No pharmacologic AC due to GI bleeding  -Intermittent pneumatic compressions    Pt is acute

## 2024-10-30 NOTE — H&P ADULT - CONVERSATION DETAILS
Pt has MOLST form dated 10/28/2024 that was completed at outside facility.  Confirmed w/ pt that is DNR w/ trial of intubation.  MOLST form located in physical chart.

## 2024-10-30 NOTE — PROGRESS NOTE ADULT - SUBJECTIVE AND OBJECTIVE BOX
HPI  PT is a 78 yo M transfer from [facility] [place] w duodenal ulcer and pancreatic mass.   Pt was seen and examined at bedside. No overnight complaints. Denies of any abd pain, N/V    Vital Signs Last 24 Hrs  T(C): 36.6 (30 Oct 2024 08:35), Max: 36.6 (30 Oct 2024 05:05)  T(F): 97.9 (30 Oct 2024 08:35), Max: 97.9 (30 Oct 2024 05:05)  HR: 76 (30 Oct 2024 08:35) (76 - 79)  BP: 137/71 (30 Oct 2024 08:35) (114/63 - 137/71)  BP(mean): --  RR: 18 (30 Oct 2024 08:35) (18 - 18)  SpO2: 97% (30 Oct 2024 08:35) (97% - 99%)    Parameters below as of 30 Oct 2024 08:35  Patient On (Oxygen Delivery Method): room air        I&O's Summary      CAPILLARY BLOOD GLUCOSE          PHYSICAL EXAM:    Constitutional: NAD   HEENT: PERR, EOMI,   Neck: Soft and supple, No LAD, No JVD  Respiratory: Breath sounds are clear bilaterally,    Cardiovascular: S1 and S2,   Gastrointestinal: Bowel Sounds present, soft,   Extremities: No peripheral edema  Vascular: 2+ peripheral pulses  Neurological: A/O x 3,   Musculoskeletal: 5/5 strength b/l upper and lower extremities  Skin: No rashes    MEDICATIONS:  MEDICATIONS  (STANDING):  carvedilol 6.25 milliGRAM(s) Oral every 12 hours  pantoprazole Infusion 8 mG/Hr (10 mL/Hr) IV Continuous <Continuous>  rosuvastatin 10 milliGRAM(s) Oral at bedtime  timolol 0.5% Solution 1 Drop(s) Both EYES at bedtime      LABS: All Labs Reviewed:                        9.3    9.10  )-----------( 117      ( 30 Oct 2024 02:00 )             27.5     10-30    138  |  104  |  22.9[H]  ----------------------------<  111[H]  4.4   |  22.0  |  1.13    Ca    8.7      30 Oct 2024 02:00  Mg     1.9     10-30    TPro  5.7[L]  /  Alb  3.7  /  TBili  0.5  /  DBili  x   /  AST  19  /  ALT  12  /  AlkPhos  70  10-30    PT/INR - ( 30 Oct 2024 02:00 )   PT: 14.4 sec;   INR: 1.28 ratio         PTT - ( 30 Oct 2024 02:00 )  PTT:46.9 sec      Blood Culture:     RADIOLOGY/EKG:    DVT PPX:    ADVANCED DIRECTIVE:    DISPOSITION:

## 2024-10-30 NOTE — PROGRESS NOTE ADULT - ASSESSMENT
This is an 78 yo F who follows Dr Foster/JAEL for a history of VICTORIA S/P IV iron  last Hb in the office on 10/8 was 13.1 Has received IV iron in the past   [place]ed to [facility] [place] on [place]27 with few days of hematemesis and melena and was found to have anemia with Hgb of 7.4, s/p CT A/P at [facility] [place] showing enlarged heterogenous pancreatic head, CBD dilation, duodenal thickening, gastric distention w/ liver hypodensities, all suspicious for neoplasm, being transferred to [facility] for advanced GI and surgical oncology eval    Acute blood loss anemia due to GI bleeding from duodenal ulcer  - follows Dr Foster/JAEL  - History of VICTORIA -S/P IV iron    - last Hb in the office on 10/8 was 13.1    -EGD on 10/28 at [facility] [place] showed single ulcer at duodenal bulb  -s/p 2pRBC for Hgb of 7.4  - Hgb now 9.3  - Continue PPI drip  -  GI evaluation, possible plan for duodenal stent    Pancreatic mass  - CT A/P at [facility] [place] showing enlarged heterogenous pancreatic head, CBD dilation, duodenal thickening, gastric distention w/ liver hypodensities, all suspicious for neoplasm,  - Ca 19-9 markedly elevated, >3K  - MRI abdomen pending  - surg-onc to evaluate   -  GI evaluation, possible plan for duodenal stent    Will follow

## 2024-10-30 NOTE — H&P ADULT - TIME BILLING
chart review, patient encounter, chart documentation.  Plan discussed with patient and floor RN.    Dispo: medically active, pending GI eval and endoscopic procedure.  Estimated LOS: unknown.

## 2024-10-30 NOTE — CONSULT NOTE ADULT - ATTENDING COMMENTS
79-year-old male with history of coronary artery disease status post CABG and hypertension here with acute blood loss anemia in the setting of duodenal ulcer along with head of pancreas mass.    Imaging reviewed in detail by myself, noted with heterogenous pancreatic head, CBD dilation, duodenal thickening, gastric distention w/ liver hypodensities, all suspicious for metastatic PDAC    PLAN:    - f/u pathology results  - f/u MRI results  - no role for surgical resection at this time  - f/u Hem/onc recs - would recommend chemotherapy   - f/u palliative and GOC would recommend palliative stent by GI

## 2024-10-31 LAB
ANION GAP SERPL CALC-SCNC: 12 MMOL/L — SIGNIFICANT CHANGE UP (ref 5–17)
BUN SERPL-MCNC: 15.5 MG/DL — SIGNIFICANT CHANGE UP (ref 8–20)
CALCIUM SERPL-MCNC: 8.3 MG/DL — LOW (ref 8.4–10.5)
CANCER AG19-9 SERPL-ACNC: 4903 U/ML — HIGH
CHLORIDE SERPL-SCNC: 108 MMOL/L — SIGNIFICANT CHANGE UP (ref 96–108)
CO2 SERPL-SCNC: 19 MMOL/L — LOW (ref 22–29)
CREAT SERPL-MCNC: 0.78 MG/DL — SIGNIFICANT CHANGE UP (ref 0.5–1.3)
EGFR: 90 ML/MIN/1.73M2 — SIGNIFICANT CHANGE UP
GLUCOSE SERPL-MCNC: 106 MG/DL — HIGH (ref 70–99)
HCT VFR BLD CALC: 25.2 % — LOW (ref 39–50)
HGB BLD-MCNC: 8.3 G/DL — LOW (ref 13–17)
MAGNESIUM SERPL-MCNC: 1.8 MG/DL — SIGNIFICANT CHANGE UP (ref 1.6–2.6)
MCHC RBC-ENTMCNC: 29.6 PG — SIGNIFICANT CHANGE UP (ref 27–34)
MCHC RBC-ENTMCNC: 32.9 G/DL — SIGNIFICANT CHANGE UP (ref 32–36)
MCV RBC AUTO: 90 FL — SIGNIFICANT CHANGE UP (ref 80–100)
PLATELET # BLD AUTO: 112 K/UL — LOW (ref 150–400)
POTASSIUM SERPL-MCNC: 4.4 MMOL/L — SIGNIFICANT CHANGE UP (ref 3.5–5.3)
POTASSIUM SERPL-SCNC: 4.4 MMOL/L — SIGNIFICANT CHANGE UP (ref 3.5–5.3)
RBC # BLD: 2.8 M/UL — LOW (ref 4.2–5.8)
RBC # FLD: 15.4 % — HIGH (ref 10.3–14.5)
SODIUM SERPL-SCNC: 139 MMOL/L — SIGNIFICANT CHANGE UP (ref 135–145)
WBC # BLD: 10.2 K/UL — SIGNIFICANT CHANGE UP (ref 3.8–10.5)
WBC # FLD AUTO: 10.2 K/UL — SIGNIFICANT CHANGE UP (ref 3.8–10.5)

## 2024-10-31 PROCEDURE — 99232 SBSQ HOSP IP/OBS MODERATE 35: CPT

## 2024-10-31 RX ADMIN — Medication 1 DROP(S): at 22:43

## 2024-10-31 RX ADMIN — CARVEDILOL 6.25 MILLIGRAM(S): 25 TABLET, FILM COATED ORAL at 17:37

## 2024-10-31 RX ADMIN — PANTOPRAZOLE SODIUM 10 MG/HR: 40 TABLET, DELAYED RELEASE ORAL at 16:36

## 2024-10-31 RX ADMIN — CARVEDILOL 6.25 MILLIGRAM(S): 25 TABLET, FILM COATED ORAL at 05:41

## 2024-10-31 RX ADMIN — Medication 10 MILLIGRAM(S): at 22:43

## 2024-10-31 NOTE — PROGRESS NOTE ADULT - SUBJECTIVE AND OBJECTIVE BOX
HPI  PT is a 80 [place] [facility] [place] pancreatic mass.   Pt was seen and examined at bedside. No overnight complaints. Denies of any abd pain, N/V      Vital Signs Last 24 Hrs  T(C): 36.4 (31 Oct 2024 09:00), Max: 37.2 (31 Oct 2024 04:50)  T(F): 97.6 (31 Oct 2024 09:00), Max: 99 (31 Oct 2024 04:50)  HR: 66 (31 Oct 2024 09:00) (64 - 81)  BP: 129/66 (31 Oct 2024 09:00) (113/63 - 144/65)  BP(mean): --  RR: 18 (31 Oct 2024 09:00) (16 - 18)  SpO2: 98% (31 Oct 2024 09:00) (95% - 100%)    Parameters below as of 31 Oct 2024 09:00  Patient On (Oxygen Delivery Method): room air        I&O's Summary    30 Oct 2024 07:01  -  31 Oct 2024 07:00  --------------------------------------------------------  IN: 300 mL / OUT: 0 mL / NET: 300 mL        CAPILLARY BLOOD GLUCOSE          PHYSICAL EXAM:  Constitutional: NAD   HEENT: PERR, EOMI,   Neck: Soft and supple, No LAD, No JVD  Respiratory: Breath sounds are clear bilaterally,    Cardiovascular: S1 and S2,   Gastrointestinal: Bowel Sounds present, soft,   Extremities: No peripheral edema  Vascular: 2+ peripheral pulses  Neurological: A/O x 3,   Musculoskeletal: 5/5 strength b/l upper and lower extremities  Skin: No rashes      MEDICATIONS:  MEDICATIONS  (STANDING):  carvedilol 6.25 milliGRAM(s) Oral every 12 hours  pantoprazole Infusion 8 mG/Hr (10 mL/Hr) IV Continuous <Continuous>  rosuvastatin 10 milliGRAM(s) Oral at bedtime  timolol 0.5% Solution 1 Drop(s) Both EYES at bedtime      LABS: All Labs Reviewed:                        8.3    10.20 )-----------( 112      ( 31 Oct 2024 04:09 )             25.2     10-31    139  |  108  |  15.5  ----------------------------<  106[H]  4.4   |  19.0[L]  |  0.78    Ca    8.3[L]      31 Oct 2024 04:09  Mg     1.8     10-31    TPro  5.7[L]  /  Alb  3.7  /  TBili  0.5  /  DBili  x   /  AST  19  /  ALT  12  /  AlkPhos  70  10-30    PT/INR - ( 30 Oct 2024 02:00 )   PT: 14.4 sec;   INR: 1.28 ratio         PTT - ( 30 Oct 2024 02:00 )  PTT:46.9 sec      Blood Culture:     RADIOLOGY/EKG:    DVT PPX:    ADVANCED DIRECTIVE:    DISPOSITION:

## 2024-10-31 NOTE — PROGRESS NOTE ADULT - ASSESSMENT
79-year-old male with history of coronary artery disease status post CABG and hypertension here with acute blood loss anemia in the setting of duodenal ulcer along with head of pancreas mass with ductal dilatation. Pathology from duodenum without cancer, however imaging suggestive of panc head cancer and CA-19-9 very elevated also suggestive of pancreatic malignancy. Will need tissue diagnosis. Considering this, plan for EGD/EUS tomorrow to evaluate and biopsy any duodenal lesion and attempt biopsy of pancreatic lesion. GI to discuss with surgical oncology regarding intervention for obstruction.     #Duodenal ulcer  #Duodenal obstruction  #Pancreatic head mass    - NPO MN  - hold anticoagulation  - Plan for EGD/EUS tomorrow  - GI to discuss with surgical oncology regarding interventions for obstruction

## 2024-10-31 NOTE — PROGRESS NOTE ADULT - SUBJECTIVE AND OBJECTIVE BOX
Subjective: Patient seen resting comfortably at bedside, Per RN no current complaints, no overnight events, denies SOB/CP/N/V.       MEDICATIONS  (STANDING):  carvedilol 6.25 milliGRAM(s) Oral every 12 hours  pantoprazole Infusion 8 mG/Hr (10 mL/Hr) IV Continuous <Continuous>  rosuvastatin 10 milliGRAM(s) Oral at bedtime  timolol 0.5% Solution 1 Drop(s) Both EYES at bedtime    MEDICATIONS  (PRN):  acetaminophen     Tablet .. 650 milliGRAM(s) Oral every 6 hours PRN Temp greater or equal to 38C (100.4F), Mild Pain (1 - 3)  melatonin 3 milliGRAM(s) Oral at bedtime PRN Insomnia  ondansetron Injectable 4 milliGRAM(s) IV Push every 6 hours PRN Nausea and/or Vomiting      Vital Signs Last 24 Hrs  T(C): 37.2 (31 Oct 2024 04:50), Max: 37.2 (31 Oct 2024 04:50)  T(F): 99 (31 Oct 2024 04:50), Max: 99 (31 Oct 2024 04:50)  HR: 75 (31 Oct 2024 04:50) (64 - 81)  BP: 128/60 (31 Oct 2024 04:50) (113/63 - 144/65)  BP(mean): --  RR: 16 (31 Oct 2024 04:50) (16 - 18)  SpO2: 96% (31 Oct 2024 04:50) (95% - 100%)    Parameters below as of 31 Oct 2024 04:50  Patient On (Oxygen Delivery Method): room air        Physical Exam:    Constitutional: NAD  HEENT: PERRL, EOMI  Respiratory: Respirations non-labored, no accessory muscle use  Gastrointestinal: Soft, non-tender, non-distended      LABS:                        8.3    10.20 )-----------( 112      ( 31 Oct 2024 04:09 )             25.2     10-31    139  |  108  |  15.5  ----------------------------<  106[H]  4.4   |  19.0[L]  |  0.78    Ca    8.3[L]      31 Oct 2024 04:09  Mg     1.8     10-31    TPro  5.7[L]  /  Alb  3.7  /  TBili  0.5  /  DBili  x   /  AST  19  /  ALT  12  /  AlkPhos  70  10-30    PT/INR - ( 30 Oct 2024 02:00 )   PT: 14.4 sec;   INR: 1.28 ratio         PTT - ( 30 Oct 2024 02:00 )  PTT:46.9 sec  Urinalysis Basic - ( 31 Oct 2024 04:09 )    Color: x / Appearance: x / SG: x / pH: x  Gluc: 106 mg/dL / Ketone: x  / Bili: x / Urobili: x   Blood: x / Protein: x / Nitrite: x   Leuk Esterase: x / RBC: x / WBC x   Sq Epi: x / Non Sq Epi: x / Bacteria: x      A: 79-year-old male with history of coronary artery disease status post CABG and hypertension here with acute blood loss anemia in the setting of duodenal ulcer along with head of pancreas mass with ductal dilatation with stomach dilation. Ca 19-9 markedly elevated,    CT A/P at showing enlarged heterogenous pancreatic head, CBD dilation, duodenal thickening, gastric distention w/ liver hypodensities, all suspicious for neoplasm      P:  f/u med onc recs  monitor HH  PPI  f/u GI plan  Plan per primary Subjective: Patient seen resting comfortably at bedside, Per RN no current complaints, no overnight events, denies SOB/CP/N/V.       MEDICATIONS  (STANDING):  carvedilol 6.25 milliGRAM(s) Oral every 12 hours  pantoprazole Infusion 8 mG/Hr (10 mL/Hr) IV Continuous <Continuous>  rosuvastatin 10 milliGRAM(s) Oral at bedtime  timolol 0.5% Solution 1 Drop(s) Both EYES at bedtime    MEDICATIONS  (PRN):  acetaminophen     Tablet .. 650 milliGRAM(s) Oral every 6 hours PRN Temp greater or equal to 38C (100.4F), Mild Pain (1 - 3)  melatonin 3 milliGRAM(s) Oral at bedtime PRN Insomnia  ondansetron Injectable 4 milliGRAM(s) IV Push every 6 hours PRN Nausea and/or Vomiting      Vital Signs Last 24 Hrs  T(C): 37.2 (31 Oct 2024 04:50), Max: 37.2 (31 Oct 2024 04:50)  T(F): 99 (31 Oct 2024 04:50), Max: 99 (31 Oct 2024 04:50)  HR: 75 (31 Oct 2024 04:50) (64 - 81)  BP: 128/60 (31 Oct 2024 04:50) (113/63 - 144/65)  BP(mean): --  RR: 16 (31 Oct 2024 04:50) (16 - 18)  SpO2: 96% (31 Oct 2024 04:50) (95% - 100%)    Parameters below as of 31 Oct 2024 04:50  Patient On (Oxygen Delivery Method): room air        Physical Exam:    Constitutional: NAD  HEENT: PERRL, EOMI  Respiratory: Respirations non-labored, no accessory muscle use  Gastrointestinal: Soft, non-tender, non-distended      LABS:                        8.3    10.20 )-----------( 112      ( 31 Oct 2024 04:09 )             25.2     10-31    139  |  108  |  15.5  ----------------------------<  106[H]  4.4   |  19.0[L]  |  0.78    Ca    8.3[L]      31 Oct 2024 04:09  Mg     1.8     10-31    TPro  5.7[L]  /  Alb  3.7  /  TBili  0.5  /  DBili  x   /  AST  19  /  ALT  12  /  AlkPhos  70  10-30    PT/INR - ( 30 Oct 2024 02:00 )   PT: 14.4 sec;   INR: 1.28 ratio         PTT - ( 30 Oct 2024 02:00 )  PTT:46.9 sec  Urinalysis Basic - ( 31 Oct 2024 04:09 )    Color: x / Appearance: x / SG: x / pH: x  Gluc: 106 mg/dL / Ketone: x  / Bili: x / Urobili: x   Blood: x / Protein: x / Nitrite: x   Leuk Esterase: x / RBC: x / WBC x   Sq Epi: x / Non Sq Epi: x / Bacteria: x      A: 79-year-old male with history of coronary artery disease status post CABG and hypertension here with acute blood loss anemia in the setting of duodenal ulcer along with head of pancreas mass with ductal dilatation with stomach dilation. Ca 19-9 markedly elevated,    CT A/P at showing enlarged heterogenous pancreatic head, CBD dilation, duodenal thickening, gastric distention w/ liver hypodensities, all suspicious for neoplasm      P:  f/u med onc recs  monitor HH  PPI  f/u GI plan  Plan per primary      Addendum:  Patient examined at bedside with rounding attending Dr. Perez   All labs and imaging have been reviewed.   No surgical intervention required at this time. Patient can see Dr. Perez as outpt  Please reconsult as needed

## 2024-10-31 NOTE — PROGRESS NOTE ADULT - SUBJECTIVE AND OBJECTIVE BOX
This is an 78 yo F who follows Dr Foster/JAEL for a history of VICTORIA S/P IV iron  last Hb in the office on 10/8 was 13.1 Has received IV iron in the past   presented to [facility] [place] on 10/27 with few days of hematemesis and melena and was found to have anemia with Hgb of 7.4, s/p CT A/P at [facility] [place] showing enlarged heterogenous pancreatic head, CBD dilation, duodenal thickening, gastric distention w/ liver hypodensities, all suspicious for neoplasm, being transferred to [facility] for advanced GI and surgical oncology eval    PAST MEDICAL & SURGICAL HISTORY:  CAD (coronary artery disease)  Hypertension  Glaucoma  Pancreatic mass  Duodenal ulcer  GERD (gastroesophageal reflux disease)  S/P inguinal hernia repair  H/O aortic valve replacement    Allergies  No Known Allergies  Intolerances  Lipitor (Other)    MEDICATIONS  (STANDING):  carvedilol 6.25 milliGRAM(s) Oral every 12 hours  pantoprazole Infusion 8 mG/Hr (10 mL/Hr) IV Continuous <Continuous>  rosuvastatin 10 milliGRAM(s) Oral at bedtime  timolol 0.5% Solution 1 Drop(s) Both EYES at bedtime    MEDICATIONS  (PRN):  acetaminophen     Tablet .. 650 milliGRAM(s) Oral every 6 hours PRN Temp greater or equal to 38C (100.4F), Mild Pain (1 - 3)  melatonin 3 milliGRAM(s) Oral at bedtime PRN Insomnia  ondansetron Injectable 4 milliGRAM(s) IV Push every 6 hours PRN Nausea and/or Vomiting    Vital Signs Last 24 Hrs  T(C): 37.2 (31 Oct 2024 04:50), Max: 37.2 (31 Oct 2024 04:50)  T(F): 99 (31 Oct 2024 04:50), Max: 99 (31 Oct 2024 04:50)  HR: 75 (31 Oct 2024 04:50) (64 - 81)  BP: 128/60 (31 Oct 2024 04:50) (113/63 - 144/65)  BP(mean): --  RR: 16 (31 Oct 2024 04:50) (16 - 18)  SpO2: 96% (31 Oct 2024 04:50) (95% - 100%)    Parameters below as of 31 Oct 2024 04:50  Patient On (Oxygen Delivery Method): room air      GENERAL:  Well-appearing elderly male, not in acute distress  EYES:  Clear conjunctiva, extraocular movement intact  ENT: Moist mucous membranes  RESP:   lungs clear to ausculation   CV: Regular rate and rhythm  GI: Soft, non-tender, non-distended  NEURO: Awake, alert, conversant   PSYCH: Calm, cooperative  SKIN: No rash or lesions, warm and dry    CBC                          8.3    10.20 )-----------( 112      ( 31 Oct 2024 04:09 )             25.2                           9.3    9.10  )-----------( 117      ( 30 Oct 2024 02:00 )             27.5     CHEM    10-31    139  |  108  |  15.5  ----------------------------<  106[H]  4.4   |  19.0[L]  |  0.78    Ca    8.3[L]      31 Oct 2024 04:09  Mg     1.8     10-31    TPro  5.7[L]  /  Alb  3.7  /  TBili  0.5  /  DBili  x   /  AST  19  /  ALT  12  /  AlkPhos  70  10-30      10-30    138  |  104  |  22.9[H]  ----------------------------<  111[H]  4.4   |  22.0  |  1.13    Ca    8.7      30 Oct 2024 02:00  Mg     1.9     10-30    TPro  5.7[L]  /  Alb  3.7  /  TBili  0.5  /  DBili  x   /  AST  19  /  ALT  12  /  AlkPhos  70  10-30

## 2024-10-31 NOTE — PROGRESS NOTE ADULT - ASSESSMENT
79yoM hx CAD, HTN, glaucoma who presented to [facility] on 10/27 with few days of hematemesis and melena and was found to have anemia with Hgb of 7.4, s/p CT A/P at [facility] showing enlarged heterogenous pancreatic head, CBD dilation, duodenal thickening, gastric distention w/ liver hypodensities, all suspicious for neoplasm, being transferred to [facility] for advanced GI and surgical oncology eval    *Acute blood loss anemia due to GI bleeding from duodenal ulcer  -EGD on 10/28 at [facility] showed single ulcer at duodenal bulb  -s/p 2pRBC for Hgb of 7.4, w/ response   -Continue PPI drip  - clear liquid diet per GI   -GI consult appreciate it     Pancreatic mass  -Concern for pancreatic malignancy   -Ca 19-9 markedly elevated, >3K  -MRI abdomen had been recommended by oncology at [facility], will order  -Surg-onc consult appreciate it, pt is not candidate due to metastatic dz  -Medical oncology consult appreciate it   -MR of abd w contrast can be find under [facility], done from [facility]     CJ, hyponatremia  -Now resolved, monitor     Hx CAD, HTN  -ASA on hold due to anemia, GI bleeding  -Lisinopril and spironolactone were hold due to CJ, hyponatremia   -Will continue to hold as normotensive and pt NPO    Hx glaucoma  -Timolol eye drops    Prophylactic measure  -No pharmacologic AC due to GI bleeding  -Intermittent pneumatic compressions    Pt is acute   Per GI, intervention pending pathology result from [facility]    79yoM hx CAD, HTN, glaucoma who presented to [facility] on 10/27 with few days of hematemesis and melena and was found to have anemia with Hgb of 7.4, s/p CT A/P at [facility] showing enlarged heterogenous pancreatic head, CBD dilation, duodenal thickening, gastric distention w/ liver hypodensities, all suspicious for neoplasm, being transferred to [facility] for advanced GI and surgical oncology eval    *Acute blood loss anemia due to GI bleeding from duodenal ulcer  -EGD on 10/28 at [facility] showed single ulcer at duodenal bulb  -s/p 2pRBC for Hgb of 7.4, w/ response   -Continue PPI drip  - clear liquid diet per GI   -case discussed with GI today, bx was nonclusive  -pending repeat EGD/EUS tomorrow, npo after midnight     Pancreatic mass  -Concern for pancreatic malignancy   -Ca 19-9 markedly elevated, >3K  -MRI abdomen had been recommended by oncology at [facility], will order  -Surg-onc consult appreciate it, pt is not candidate due to metastatic dz  -Medical oncology consult appreciate it   -[facility]     CJ, hyponatremia  -Now resolved, monitor     Hx CAD, HTN  -ASA on hold due to anemia, GI bleeding  -Lisinopril and spironolactone were hold due to CJ, hyponatremia   -Will continue to hold as normotensive and pt NPO    Hx glaucoma  -Timolol eye drops    Prophylactic measure  -No pharmacologic AC due to GI bleeding  -Intermittent pneumatic compressions    Pt is acute   Per GI, repeat EGD/EUS tomorrow

## 2024-10-31 NOTE — PROGRESS NOTE ADULT - ASSESSMENT
This is an 78 yo F who follows Dr Foster/JAEL for a history of VICTORIA S/P IV iron  last Hb in the office on 10/8 was 13.1 Has received IV iron in the past   presented to [facility] [place] on 10/27 with few days of hematemesis and melena and was found to have anemia with Hgb of 7.4, s/p CT A/P at [facility] [place] showing enlarged heterogenous pancreatic head, CBD dilation, duodenal thickening, gastric distention w/ liver hypodensities, all suspicious for neoplasm, being transferred to [facility] for advanced GI and surgical oncology eval    Acute blood loss anemia due to GI bleeding from duodenal ulcer  - follows Dr Foster/JAEL  - History of VICTORIA -S/P IV iron    - last Hb in the office on 10/8 was 13.1    -EGD on 10/28 at [facility] showed single ulcer at duodenal bulb  -s/p 2pRBC for Hgb of 7.4  - Hgb now 8.3  - Continue PPI drip  -  GI following- He has an obstruction on upper endoscopy that prohibited the passage of a 9 mm standard EGD scope.Will await pathology results, if this is diagnostic of adenocarcinoma, EUS is not required, in the setting of duodenal obstruction, the HOP may not be accessible from the stomach    Pancreatic mass  - CT A/P at [facility] showing enlarged heterogenous pancreatic head, CBD dilation, duodenal thickening, gastric distention w/ liver hypodensities, all suspicious for neoplasm,  - Ca 19-9 markedly elevated >3K  - MRI abdomen pending  - surg evaluated -No surgical intervention required at this time. Patient can see Dr. Perez as outpt  -  GI folowing    Will follow    This is an 78 yo F who follows Dr Foster/JAEL for a history of VICTORIA S/P IV iron  last Hb in the office on 10/8 was 13.1 Has received IV iron in the past   presented to [facility] on 10/27 with few days of hematemesis and melena and was found to have anemia with Hgb of 7.4, s/p CT A/P at [facility] showing enlarged heterogenous pancreatic head, CBD dilation, duodenal thickening, gastric distention w/ liver hypodensities, all suspicious for neoplasm, being transferred to [facility] for advanced GI and surgical oncology eval    Acute blood loss anemia due to GI bleeding from duodenal ulcer  - follows Dr Foster/JAEL  - History of VICTORIA -S/P IV iron    - last Hb in the office on 10/8 was 13.1    -EGD on 10/28 at [facility] showed single ulcer at duodenal bulb  -s/p 2pRBC for Hgb of 7.4  - Hgb now 8.3  - Continue PPI drip  -  GI following- He has an obstruction on upper endoscopy that prohibited the passage of a 9 mm standard EGD scope.  - Will await pathology results, if this is diagnostic of adenocarcinoma, EUS is not required, in the setting of duodenal obstruction, the HOP may not be accessible from the stomach    Pancreatic mass  - CT A/P at [facility] showing enlarged heterogenous pancreatic head, CBD dilation, duodenal thickening, gastric distention w/ liver hypodensities, all suspicious for neoplasm,  - Ca 19-9 markedly elevated >3K  - MRI abdomen completed at [facility] Technically limited study.Findings highly likely to represent metastatic pancreatic cancer, infiltrative primary pancreatic head neoplasm invading the duodenum and small presumed hepatic metastases.  - surg evaluated -No surgical intervention required at this time. Patient can see Dr. Perez as outpt  -  GI following    Will follow

## 2024-11-01 ENCOUNTER — TRANSCRIPTION ENCOUNTER (OUTPATIENT)
Age: 79
End: 2024-11-01

## 2024-11-01 LAB
ALBUMIN SERPL ELPH-MCNC: 3.2 G/DL — LOW (ref 3.3–5.2)
ALP SERPL-CCNC: 64 U/L — SIGNIFICANT CHANGE UP (ref 40–120)
ALT FLD-CCNC: 10 U/L — SIGNIFICANT CHANGE UP
ANION GAP SERPL CALC-SCNC: 12 MMOL/L — SIGNIFICANT CHANGE UP (ref 5–17)
ANION GAP SERPL CALC-SCNC: 12 MMOL/L — SIGNIFICANT CHANGE UP (ref 5–17)
AST SERPL-CCNC: 13 U/L — SIGNIFICANT CHANGE UP
BILIRUB SERPL-MCNC: 0.4 MG/DL — SIGNIFICANT CHANGE UP (ref 0.4–2)
BLD GP AB SCN SERPL QL: SIGNIFICANT CHANGE UP
BUN SERPL-MCNC: 14.9 MG/DL — SIGNIFICANT CHANGE UP (ref 8–20)
BUN SERPL-MCNC: 15 MG/DL — SIGNIFICANT CHANGE UP (ref 8–20)
CALCIUM SERPL-MCNC: 8.6 MG/DL — SIGNIFICANT CHANGE UP (ref 8.4–10.5)
CALCIUM SERPL-MCNC: 8.7 MG/DL — SIGNIFICANT CHANGE UP (ref 8.4–10.5)
CHLORIDE SERPL-SCNC: 103 MMOL/L — SIGNIFICANT CHANGE UP (ref 96–108)
CHLORIDE SERPL-SCNC: 104 MMOL/L — SIGNIFICANT CHANGE UP (ref 96–108)
CO2 SERPL-SCNC: 20 MMOL/L — LOW (ref 22–29)
CO2 SERPL-SCNC: 20 MMOL/L — LOW (ref 22–29)
CREAT SERPL-MCNC: 0.88 MG/DL — SIGNIFICANT CHANGE UP (ref 0.5–1.3)
CREAT SERPL-MCNC: 0.93 MG/DL — SIGNIFICANT CHANGE UP (ref 0.5–1.3)
EGFR: 83 ML/MIN/1.73M2 — SIGNIFICANT CHANGE UP
EGFR: 87 ML/MIN/1.73M2 — SIGNIFICANT CHANGE UP
GLUCOSE BLDC GLUCOMTR-MCNC: 105 MG/DL — HIGH (ref 70–99)
GLUCOSE SERPL-MCNC: 104 MG/DL — HIGH (ref 70–99)
GLUCOSE SERPL-MCNC: 106 MG/DL — HIGH (ref 70–99)
HCT VFR BLD CALC: 24.6 % — LOW (ref 39–50)
HGB BLD-MCNC: 8 G/DL — LOW (ref 13–17)
INR BLD: 1.4 RATIO — HIGH (ref 0.85–1.16)
MCHC RBC-ENTMCNC: 29.7 PG — SIGNIFICANT CHANGE UP (ref 27–34)
MCHC RBC-ENTMCNC: 32.5 G/DL — SIGNIFICANT CHANGE UP (ref 32–36)
MCV RBC AUTO: 91.4 FL — SIGNIFICANT CHANGE UP (ref 80–100)
PLATELET # BLD AUTO: 104 K/UL — LOW (ref 150–400)
POTASSIUM SERPL-MCNC: 4.3 MMOL/L — SIGNIFICANT CHANGE UP (ref 3.5–5.3)
POTASSIUM SERPL-MCNC: 4.3 MMOL/L — SIGNIFICANT CHANGE UP (ref 3.5–5.3)
POTASSIUM SERPL-SCNC: 4.3 MMOL/L — SIGNIFICANT CHANGE UP (ref 3.5–5.3)
POTASSIUM SERPL-SCNC: 4.3 MMOL/L — SIGNIFICANT CHANGE UP (ref 3.5–5.3)
PROT SERPL-MCNC: 5.1 G/DL — LOW (ref 6.6–8.7)
PROTHROM AB SERPL-ACNC: 16.2 SEC — HIGH (ref 9.9–13.4)
RBC # BLD: 2.69 M/UL — LOW (ref 4.2–5.8)
RBC # FLD: 15.1 % — HIGH (ref 10.3–14.5)
SODIUM SERPL-SCNC: 135 MMOL/L — SIGNIFICANT CHANGE UP (ref 135–145)
SODIUM SERPL-SCNC: 136 MMOL/L — SIGNIFICANT CHANGE UP (ref 135–145)
WBC # BLD: 9.39 K/UL — SIGNIFICANT CHANGE UP (ref 3.8–10.5)
WBC # FLD AUTO: 9.39 K/UL — SIGNIFICANT CHANGE UP (ref 3.8–10.5)

## 2024-11-01 PROCEDURE — 88381 MICRODISSECTION MANUAL: CPT | Mod: 26

## 2024-11-01 PROCEDURE — 43242 EGD US FINE NEEDLE BX/ASPIR: CPT

## 2024-11-01 PROCEDURE — 88305 TISSUE EXAM BY PATHOLOGIST: CPT | Mod: 26,59

## 2024-11-01 PROCEDURE — 43255 EGD CONTROL BLEEDING ANY: CPT | Mod: 59

## 2024-11-01 PROCEDURE — 99233 SBSQ HOSP IP/OBS HIGH 50: CPT

## 2024-11-01 PROCEDURE — 88173 CYTOPATH EVAL FNA REPORT: CPT | Mod: 26

## 2024-11-01 PROCEDURE — 88305 TISSUE EXAM BY PATHOLOGIST: CPT | Mod: 26

## 2024-11-01 DEVICE — SYS NEXPOWDER HEMOSTATIC: Type: IMPLANTABLE DEVICE | Status: FUNCTIONAL

## 2024-11-01 RX ORDER — MAGNESIUM SULFATE IN 0.9% NACL 2 G/50 ML
2 INTRAVENOUS SOLUTION, PIGGYBACK (ML) INTRAVENOUS ONCE
Refills: 0 | Status: COMPLETED | OUTPATIENT
Start: 2024-11-01 | End: 2024-11-01

## 2024-11-01 RX ADMIN — CARVEDILOL 6.25 MILLIGRAM(S): 25 TABLET, FILM COATED ORAL at 06:46

## 2024-11-01 RX ADMIN — PANTOPRAZOLE SODIUM 10 MG/HR: 40 TABLET, DELAYED RELEASE ORAL at 12:56

## 2024-11-01 RX ADMIN — PANTOPRAZOLE SODIUM 10 MG/HR: 40 TABLET, DELAYED RELEASE ORAL at 01:37

## 2024-11-01 RX ADMIN — Medication 75 MILLILITER(S): at 17:44

## 2024-11-01 RX ADMIN — Medication 25 GRAM(S): at 09:46

## 2024-11-01 RX ADMIN — Medication 10 MILLIGRAM(S): at 21:49

## 2024-11-01 NOTE — DIETITIAN INITIAL EVALUATION ADULT - IDEAL BODY WEIGHT (KG)
You can access the FollowMyHealth Patient Portal offered by [facility] by registering at the following website: [email or web address]. By joining [email or web address]’s FollowMyHealth portal, you will also be able to view your health information using other applications (apps) compatible with our system.
67.1

## 2024-11-01 NOTE — DIETITIAN INITIAL EVALUATION ADULT - NS FNS DIET ORDER
Diet, NPO after Midnight:      NPO Start Date: 31-Oct-2024,   NPO Start Time: 23:59 (10-31-24 @ 14:31)

## 2024-11-01 NOTE — DIETITIAN NUTRITION RISK NOTIFICATION - TREATMENT: THE FOLLOWING DIET HAS BEEN RECOMMENDED
Diet, NPO after Midnight:      NPO Start Date: 31-Oct-2024,   NPO Start Time: 23:59 (10-31-24 @ 14:31) [Active]  Diet, Clear Liquid:   DASH/TLC {Sodium & Cholesterol Restricted} (DASH)  Supplement Feeding Modality:  Oral  Ensure Clear Cans or Servings Per Day:  3       Frequency:  Three Times a day (10-30-24 @ 08:52) [Active]

## 2024-11-01 NOTE — PROGRESS NOTE ADULT - SUBJECTIVE AND OBJECTIVE BOX
This is an 78 yo F who follows Dr Foster/JAEL for a history of VICTORIA S/P IV iron  last Hb in the office on 10/8 was 13.1 Has received IV iron in the past   presented to [facility] on 10/27 with few days of hematemesis and melena and was found to have anemia with Hgb of 7.4, s/p CT A/P at [facility] [place] showing enlarged heterogenous pancreatic head, CBD dilation, duodenal thickening, gastric distention w/ liver hypodensities, all suspicious for neoplasm, being transferred to [facility] for advanced GI and surgical oncology eval    inconclusive bx, for repeat EUS today    MEDICATIONS  (STANDING):  carvedilol 6.25 milliGRAM(s) Oral every 12 hours  pantoprazole Infusion 8 mG/Hr (10 mL/Hr) IV Continuous <Continuous>  rosuvastatin 10 milliGRAM(s) Oral at bedtime  timolol 0.5% Solution 1 Drop(s) Both EYES at bedtime    MEDICATIONS  (PRN):  acetaminophen     Tablet .. 650 milliGRAM(s) Oral every 6 hours PRN Temp greater or equal to 38C (100.4F), Mild Pain (1 - 3)  melatonin 3 milliGRAM(s) Oral at bedtime PRN Insomnia  ondansetron Injectable 4 milliGRAM(s) IV Push every 6 hours PRN Nausea and/or Vomiting      ICU Vital Signs Last 24 Hrs  T(C): 36.8 (01 Nov 2024 08:11), Max: 37 (01 Nov 2024 04:20)  T(F): 98.3 (01 Nov 2024 08:11), Max: 98.6 (01 Nov 2024 04:20)  HR: 75 (01 Nov 2024 08:11) (63 - 79)  BP: 115/53 (01 Nov 2024 08:11) (115/53 - 130/67)  BP(mean): --  ABP: --  ABP(mean): --  RR: 14 (01 Nov 2024 08:11) (14 - 18)  SpO2: 95% (01 Nov 2024 08:11) (95% - 100%)    O2 Parameters below as of 01 Nov 2024 08:11  Patient On (Oxygen Delivery Method): room air      GENERAL:  Well-appearing elderly male, not in acute distress  EYES:  Clear conjunctiva, extraocular movement intact  ENT: Moist mucous membranes  RESP:   lungs clear to ausculation   CV: Regular rate and rhythm  GI: Soft, non-tender, non-distended  NEURO: Awake, alert, conversant   PSYCH: Calm, cooperative  SKIN: No rash or lesions, warm and dry    CBC                          8.0    9.39  )-----------( 104      ( 01 Nov 2024 05:00 )             24.6                           8.3    10.20 )-----------( 112      ( 31 Oct 2024 04:09 )             25.2                           9.3    9.10  )-----------( 117      ( 30 Oct 2024 02:00 )             27.5     CHEM    10-31    139  |  108  |  15.5  ----------------------------<  106[H]  4.4   |  19.0[L]  |  0.78    Ca    8.3[L]      31 Oct 2024 04:09  Mg     1.8     10-31    TPro  5.7[L]  /  Alb  3.7  /  TBili  0.5  /  DBili  x   /  AST  19  /  ALT  12  /  AlkPhos  70  10-30      10-30    138  |  104  |  22.9[H]  ----------------------------<  111[H]  4.4   |  22.0  |  1.13    Ca    8.7      30 Oct 2024 02:00  Mg     1.9     10-30    TPro  5.7[L]  /  Alb  3.7  /  TBili  0.5  /  DBili  x   /  AST  19  /  ALT  12  /  AlkPhos  70  10-30

## 2024-11-01 NOTE — DIETITIAN INITIAL EVALUATION ADULT - SIGNS/SYMPTOMS
as evidenced by likely meeting < 75% est needs > 1 mo, mod-sev muscle/fat wasting, 5% wt loss x 3 mo

## 2024-11-01 NOTE — PROGRESS NOTE ADULT - ASSESSMENT
79yoM hx CAD, HTN, glaucoma who presented to [facility] on 10/27 with few days of hematemesis and melena and was found to have anemia with Hgb of 7.4, s/p CT A/P at [facility] showing enlarged heterogenous pancreatic head, CBD dilation, duodenal thickening, gastric distention w/ liver hypodensities, all suspicious for neoplasm, being transferred to [facility] for advanced GI and surgical oncology eval    *Acute blood loss anemia due to GI bleeding from duodenal ulcer  -EGD on 10/28 at [facility] showed single ulcer at duodenal bulb  -s/p 2pRBC for Hgb of 7.4, w/ response   -Continue PPI drip  - clear liquid diet per GI   -case discussed with GI today, bx was nonclusive  -pending repeat EGD/EUS tomorrow, npo after midnight     Pancreatic mass  -Concern for pancreatic malignancy   -Ca 19-9 markedly elevated, >3K  -MRI abdomen had been recommended by oncology at [facility], will order  -Surg-onc consult appreciate it, pt is not candidate due to metastatic dz  -Medical oncology consult appreciate it   -[facility]     CJ, hyponatremia  -Now resolved, monitor     Hx CAD, HTN  -ASA on hold due to anemia, GI bleeding  -Lisinopril and spironolactone were hold due to CJ, hyponatremia   -Will continue to hold as normotensive and pt NPO    Hx glaucoma  -Timolol eye drops    Prophylactic measure  -No pharmacologic AC due to GI bleeding  -Intermittent pneumatic compressions    Pt is acute   Per GI, repeat EGD/EUS tomorrow    79yoM hx CAD, HTN, glaucoma who presented to [facility] on 10/27 with few days of hematemesis and melena and was found to have anemia with Hgb of 7.4, s/p CT A/P at [facility] showing enlarged heterogenous pancreatic head, CBD dilation, duodenal thickening, gastric distention w/ liver hypodensities, all suspicious for neoplasm, being transferred to [facility] for advanced GI and surgical oncology eval    1-Acute blood loss anemia due to GI bleeding from duodenal ulcer  -EGD on 10/28 at [facility] showed single ulcer at duodenal bulb  -s/p 2pRBC   -Continue PPI drip  NPO for repead EGD / EUS today     2-Pancreatic mass  -Concern for pancreatic malignancy   -Ca 19-9 markedly elevated, >3  -Surg-onc consult appreciate it, pt is not candidate due to metastatic dz  -Medical oncology consult appreciate it   -MR of abd w contrast can be find under [facility], done from [facility]     3-CJ, hyponatremia  resolved   cont to monitor     4-Hx CAD, HTN  -ASA on hold due to anemia, GI bleeding  -Lisinopril and spironolactone were hold due to CJ, hyponatremia   -Will continue to hold as normotensive    5-Hx glaucoma  -Timolol eye drops    Prophylactic measure    -No pharmacologic AC due to GI bleeding  -Intermittent pneumatic compressions    Pt is acute   Per GI, repeat EGD/EUS tomorrow

## 2024-11-01 NOTE — PROGRESS NOTE ADULT - SUBJECTIVE AND OBJECTIVE BOX
Patient is a 79y old  Male who presents with a chief complaint of Acute blood loss anemia due to GI bleeding from duodenal ulcer, pancreatic mass (31 Oct 2024 15:22)      chart reviewed   incomplete   ALLERGIES:  No Known Allergies  Lipitor (Other)    MEDICATIONS  (STANDING):  carvedilol 6.25 milliGRAM(s) Oral every 12 hours  pantoprazole Infusion 8 mG/Hr (10 mL/Hr) IV Continuous <Continuous>  rosuvastatin 10 milliGRAM(s) Oral at bedtime  timolol 0.5% Solution 1 Drop(s) Both EYES at bedtime    MEDICATIONS  (PRN):  acetaminophen     Tablet .. 650 milliGRAM(s) Oral every 6 hours PRN Temp greater or equal to 38C (100.4F), Mild Pain (1 - 3)  melatonin 3 milliGRAM(s) Oral at bedtime PRN Insomnia  ondansetron Injectable 4 milliGRAM(s) IV Push every 6 hours PRN Nausea and/or Vomiting    Vital Signs Last 24 Hrs  T(F): 98.6 (01 Nov 2024 04:20), Max: 98.6 (01 Nov 2024 04:20)  HR: 79 (01 Nov 2024 06:30) (63 - 79)  BP: 130/67 (01 Nov 2024 06:30) (125/61 - 130/67)  RR: 18 (01 Nov 2024 04:20) (17 - 18)  SpO2: 95% (01 Nov 2024 04:20) (95% - 100%)  I&O's Summary    31 Oct 2024 07:01  -  01 Nov 2024 07:00  --------------------------------------------------------  IN: 560 mL / OUT: 600 mL / NET: -40 mL        PHYSICAL EXAM:  General:   ENT:   Neck:   Lungs:   Cardio: RRR, S1/S2, No murmur  Abdomen: Soft, Nontender, Nondistended; Bowel sounds present  Extremities: No calf tenderness, No pitting edema    LABS:                        8.0    9.39  )-----------( 104      ( 01 Nov 2024 05:00 )             24.6     11-01    135  |  103  |  15.0  ----------------------------<  104  4.3   |  20.0  |  0.88    Ca    8.6      01 Nov 2024 05:00  Mg     1.8     10-31    TPro  5.1  /  Alb  3.2  /  TBili  0.4  /  DBili  x   /  AST  13  /  ALT  10  /  AlkPhos  64  11-01          PT/INR - ( 01 Nov 2024 05:00 )   PT: 16.2 sec;   INR: 1.40 ratio         PTT - ( 30 Oct 2024 02:00 )  PTT:46.9 sec                            Urinalysis Basic - ( 01 Nov 2024 05:00 )    Color: x / Appearance: x / SG: x / pH: x  Gluc: 104 mg/dL / Ketone: x  / Bili: x / Urobili: x   Blood: x / Protein: x / Nitrite: x   Leuk Esterase: x / RBC: x / WBC x   Sq Epi: x / Non Sq Epi: x / Bacteria: x          RADIOLOGY & ADDITIONAL TESTS:       Patient is a 79y old  Male who presents with a chief complaint of Acute blood loss anemia due to GI bleeding from duodenal ulcer, pancreatic mass (31 Oct 2024 15:22)      chart reviewed   Pt is see earlier today   he is feeling well   some abdominal discomfort in epigastric area reported   ALLERGIES:  No Known Allergies  Lipitor (Other)    MEDICATIONS  (STANDING):  carvedilol 6.25 milliGRAM(s) Oral every 12 hours  pantoprazole Infusion 8 mG/Hr (10 mL/Hr) IV Continuous <Continuous>  rosuvastatin 10 milliGRAM(s) Oral at bedtime  timolol 0.5% Solution 1 Drop(s) Both EYES at bedtime    MEDICATIONS  (PRN):  acetaminophen     Tablet .. 650 milliGRAM(s) Oral every 6 hours PRN Temp greater or equal to 38C (100.4F), Mild Pain (1 - 3)  melatonin 3 milliGRAM(s) Oral at bedtime PRN Insomnia  ondansetron Injectable 4 milliGRAM(s) IV Push every 6 hours PRN Nausea and/or Vomiting    Vital Signs Last 24 Hrs  T(F): 98.6 (01 Nov 2024 04:20), Max: 98.6 (01 Nov 2024 04:20)  HR: 79 (01 Nov 2024 06:30) (63 - 79)  BP: 130/67 (01 Nov 2024 06:30) (125/61 - 130/67)  RR: 18 (01 Nov 2024 04:20) (17 - 18)  SpO2: 95% (01 Nov 2024 04:20) (95% - 100%)  I&O's Summary    31 Oct 2024 07:01  -  01 Nov 2024 07:00  --------------------------------------------------------  IN: 560 mL / OUT: 600 mL / NET: -40 mL        PHYSICAL EXAM:  General: awake no distress  Neck: supple   Lungs: CTA   Cardio: RRR, S1/S2, No murmur  Abdomen: Soft, Nontender, Nondistended; Bowel sounds present  Extremities: No calf tenderness, No pitting edema  Skin warm normal color       LABS:                        8.0    9.39  )-----------( 104      ( 01 Nov 2024 05:00 )             24.6     11-01    135  |  103  |  15.0  ----------------------------<  104  4.3   |  20.0  |  0.88    Ca    8.6      01 Nov 2024 05:00  Mg     1.8     10-31    TPro  5.1  /  Alb  3.2  /  TBili  0.4  /  DBili  x   /  AST  13  /  ALT  10  /  AlkPhos  64  11-01          PT/INR - ( 01 Nov 2024 05:00 )   PT: 16.2 sec;   INR: 1.40 ratio         PTT - ( 30 Oct 2024 02:00 )  PTT:46.9 sec                            Urinalysis Basic - ( 01 Nov 2024 05:00 )    Color: x / Appearance: x / SG: x / pH: x  Gluc: 104 mg/dL / Ketone: x  / Bili: x / Urobili: x   Blood: x / Protein: x / Nitrite: x   Leuk Esterase: x / RBC: x / WBC x   Sq Epi: x / Non Sq Epi: x / Bacteria: x          RADIOLOGY & ADDITIONAL TESTS:

## 2024-11-01 NOTE — DIETITIAN INITIAL EVALUATION ADULT - PERTINENT LABORATORY DATA
11-01    135  |  103  |  15.0  ----------------------------<  104[H]  4.3   |  20.0[L]  |  0.88    Ca    8.6      01 Nov 2024 05:00  Mg     1.8     10-31    TPro  5.1[L]  /  Alb  3.2[L]  /  TBili  0.4  /  DBili  x   /  AST  13  /  ALT  10  /  AlkPhos  64  11-01

## 2024-11-01 NOTE — DIETITIAN INITIAL EVALUATION ADULT - ADD RECOMMEND
Diet advancement as feasible, provide appropriate oral nutrition supplementation with advancement  Rx: MVI daily to optimize nutrition status  Monitor nutrition related labs, weight trends, and BMs

## 2024-11-01 NOTE — DIETITIAN INITIAL EVALUATION ADULT - PERTINENT MEDS FT
MEDICATIONS  (STANDING):  magnesium sulfate  IVPB 2 Gram(s) IV Intermittent once  pantoprazole Infusion 8 mG/Hr (10 mL/Hr) IV Continuous <Continuous>    MEDICATIONS  (PRN):  ondansetron Injectable 4 milliGRAM(s) IV Push every 6 hours PRN Nausea and/or Vomiting

## 2024-11-01 NOTE — DIETITIAN INITIAL EVALUATION ADULT - OTHER INFO
79yoM hx CAD, HTN, glaucoma who presented to [facility] on 10/27 with few days of hematemesis and melena and was found to have anemia with Hgb of 7.4, s/p CT A/P at [facility] showing enlarged heterogenous pancreatic head, CBD dilation, duodenal thickening, gastric distention w/ liver hypo densities, all suspicious for neoplasm, transferred to [facility] for advanced GI and surgical oncology eval.

## 2024-11-01 NOTE — DIETITIAN INITIAL EVALUATION ADULT - ORAL INTAKE PTA/DIET HISTORY
MST consult triggered for unsure weight loss. Met with pt at bedside this morning, he is currently NPO awaiting EGD today. Previously on clear liquid diet and tolerated, dislikes gingerale, offered pt alternative options appropriate for clear liquid diet.  lbs a few months ago, pt endorses some weight loss. Current weight 128 lbs. Pt appears thin with muscle/fat wasting. States he eats 3 meals a day and dessert, diet recall reflects healthy, balanced diet. He follows a gluten free diet, states he was told he had celiac disease after having a colonoscopy 3 years ago, updated allergy in chart. Weight loss is likely secondary to disease process of suspected neoplasm. Made pt aware RD remains available, will follow up s/p diet advancement as schedule permits. Encouraged continued adequate PO intake.

## 2024-11-01 NOTE — PROGRESS NOTE ADULT - ASSESSMENT
This is an 78 yo F who follows Dr Foster/JAEL for a history of VICTORIA S/P IV iron  last Hb in the office on 10/8 was 13.1 Has received IV iron in the past   presented to [facility] on 10/27 with few days of hematemesis and melena and was found to have anemia with Hgb of 7.4, s/p CT A/P at [facility] showing enlarged heterogenous pancreatic head, CBD dilation, duodenal thickening, gastric distention w/ liver hypodensities, all suspicious for neoplasm, being transferred to [facility] for advanced GI and surgical oncology eval    Acute blood loss anemia due to GI bleeding from duodenal ulcer  - follows Dr Foster/JAEL  - History of VICTORIA -S/P IV iron    - last Hb in the office on 10/8 was 13.1    -EGD on 10/28 at [facility] showed single ulcer at duodenal bulb  -s/p 2pRBC for Hgb of 7.4  - Hgb now 8.3  - Continue PPI drip  -  GI following- He has an obstruction on upper endoscopy that prohibited the passage of a 9 mm standard EGD scope.  - inconclusive biopsy, to go for repeat EUS with biopsy    Pancreatic mass  - CT A/P at [facility] showing enlarged heterogenous pancreatic head, CBD dilation, duodenal thickening, gastric distention w/ liver hypodensities, all suspicious for neoplasm,  - Ca 19-9 markedly elevated >3K  - MRI abdomen completed at [facility] Technically limited study.Findings highly likely to represent metastatic pancreatic cancer, infiltrative primary pancreatic head neoplasm invading the duodenum and small presumed hepatic metastases.  - surg evaluated -No surgical intervention required at this time. Patient can see Dr. Perez as outpt  -  GI following    Will follow    This is an 78 yo F who follows Dr Foster/JAEL for a history of VICTORIA S/P IV iron  last Hb in the office on 10/8 was 13.1 Has received IV iron in the past   presented to [facility] on 10/27 with few days of hematemesis and melena and was found to have anemia with Hgb of 7.4, s/p CT A/P at [facility] showing enlarged heterogenous pancreatic head, CBD dilation, duodenal thickening, gastric distention w/ liver hypodensities, all suspicious for neoplasm, being transferred to [facility] for advanced GI and surgical oncology eval    Acute blood loss anemia due to GI bleeding from duodenal ulcer  - follows Dr Foster/JAEL  - History of VICTORIA -S/P IV iron    - last Hb in the office on 10/8 was 13.1    -EGD on 10/28 at [facility] showed single ulcer at duodenal bulb  -s/p 2pRBC for Hgb of 7.4  - Hgb now 8.3  - Continue PPI drip  -  GI following- He has an obstruction on upper endoscopy that prohibited the passage of a 9 mm standard EGD scope.  - inconclusive biopsy, to go for repeat EUS with biopsy    Pancreatic mass  - CT A/P at [facility] showing enlarged heterogenous pancreatic head, CBD dilation, duodenal thickening, gastric distention w/ liver hypodensities, all suspicious for neoplasm,  - Ca 19-9 markedly elevated >3K  - MRI abdomen completed at [facility] Technically limited study.Findings highly likely to represent metastatic pancreatic cancer, infiltrative primary pancreatic head neoplasm invading the duodenum and small presumed hepatic metastases.  - surg evaluated -No surgical intervention required at this time.   -  GI following    Will follow

## 2024-11-02 DIAGNOSIS — K59.00 CONSTIPATION, UNSPECIFIED: ICD-10-CM

## 2024-11-02 DIAGNOSIS — K31.89 OTHER DISEASES OF STOMACH AND DUODENUM: ICD-10-CM

## 2024-11-02 LAB
GLUCOSE BLDC GLUCOMTR-MCNC: 146 MG/DL — HIGH (ref 70–99)
HCT VFR BLD CALC: 25.2 % — LOW (ref 39–50)
HGB BLD-MCNC: 8.1 G/DL — LOW (ref 13–17)
MCHC RBC-ENTMCNC: 29.9 PG — SIGNIFICANT CHANGE UP (ref 27–34)
MCHC RBC-ENTMCNC: 32.1 G/DL — SIGNIFICANT CHANGE UP (ref 32–36)
MCV RBC AUTO: 93 FL — SIGNIFICANT CHANGE UP (ref 80–100)
PLATELET # BLD AUTO: 130 K/UL — LOW (ref 150–400)
RBC # BLD: 2.71 M/UL — LOW (ref 4.2–5.8)
RBC # FLD: 15.2 % — HIGH (ref 10.3–14.5)
WBC # BLD: 15.61 K/UL — HIGH (ref 3.8–10.5)
WBC # FLD AUTO: 15.61 K/UL — HIGH (ref 3.8–10.5)

## 2024-11-02 PROCEDURE — 99232 SBSQ HOSP IP/OBS MODERATE 35: CPT

## 2024-11-02 PROCEDURE — 99233 SBSQ HOSP IP/OBS HIGH 50: CPT

## 2024-11-02 RX ORDER — TAMSULOSIN HCL 0.4 MG
0.4 CAPSULE ORAL AT BEDTIME
Refills: 0 | Status: DISCONTINUED | OUTPATIENT
Start: 2024-11-02 | End: 2024-11-14

## 2024-11-02 RX ORDER — GLYCERIN 2.1 G/1
1 SUPPOSITORY RECTAL AT BEDTIME
Refills: 0 | Status: DISCONTINUED | OUTPATIENT
Start: 2024-11-02 | End: 2024-11-14

## 2024-11-02 RX ORDER — SENNA 187 MG
2 TABLET ORAL AT BEDTIME
Refills: 0 | Status: DISCONTINUED | OUTPATIENT
Start: 2024-11-02 | End: 2024-11-14

## 2024-11-02 RX ORDER — AMLODIPINE BESYLATE 10 MG
2.5 TABLET ORAL DAILY
Refills: 0 | Status: DISCONTINUED | OUTPATIENT
Start: 2024-11-02 | End: 2024-11-09

## 2024-11-02 RX ADMIN — Medication 1 DROP(S): at 00:07

## 2024-11-02 RX ADMIN — Medication 1 DROP(S): at 21:16

## 2024-11-02 RX ADMIN — CARVEDILOL 6.25 MILLIGRAM(S): 25 TABLET, FILM COATED ORAL at 18:13

## 2024-11-02 RX ADMIN — Medication 0.4 MILLIGRAM(S): at 21:17

## 2024-11-02 RX ADMIN — Medication 2 TABLET(S): at 21:17

## 2024-11-02 RX ADMIN — Medication 10 MILLIGRAM(S): at 21:20

## 2024-11-02 RX ADMIN — CARVEDILOL 6.25 MILLIGRAM(S): 25 TABLET, FILM COATED ORAL at 05:11

## 2024-11-02 RX ADMIN — PANTOPRAZOLE SODIUM 10 MG/HR: 40 TABLET, DELAYED RELEASE ORAL at 11:54

## 2024-11-02 NOTE — PROGRESS NOTE ADULT - SUBJECTIVE AND OBJECTIVE BOX
Patient is a 79y old  Male who presents with a chief complaint of Acute blood loss anemia due to GI bleeding from duodenal ulcer, pancreatic mass (02 Nov 2024 04:45)      HPI:  79yoM hx CAD, HTN, glaucoma who presented to [facility] on 10/27 with few days of hematemesis and melena and was found to have anemia with Hgb of 7.4. CT A/P at [facility] showed enlarged heterogenous pancreatic head. EUS yesterday showed duodenal mass 4 cm, no pancreatic mass, FNA done and hemospray applied for oozing. No melena, no hematachezia.  NO BM in 1 week. NO abdominal pain, no nausea, no vomiting           REVIEW OF SYSTEMS:  Constitutional: No fever, weight loss or fatigue  ENMT:  No difficulty hearing, tinnitus, vertigo; No sinus or throat pain  Respiratory: No cough, wheezing, chills or hemoptysis  Cardiovascular: No chest pain, palpitations, dizziness or leg swelling  Gastrointestinal: No abdominal or epigastric pain. No nausea, vomiting or hematemesis; No diarrhea or constipation. No melena or hematochezia.  Skin: No itching, burning, rashes or lesions   Musculoskeletal: No joint pain or swelling; No muscle, back or extremity pain    PAST MEDICAL & SURGICAL HISTORY:  CAD (coronary artery disease)      Hypertension      Glaucoma      Pancreatic mass      Duodenal ulcer      GERD (gastroesophageal reflux disease)      S/P inguinal hernia repair      H/O aortic valve replacement          FAMILY HISTORY:  FHx: coronary artery disease        SOCIAL HISTORY:  Smoking Status: [ ] Current, [ ] Former, [ ] Never  Pack Years:  [  ] EtOH-no  [  ] IVDA    MEDICATIONS:  MEDICATIONS  (STANDING):  carvedilol 6.25 milliGRAM(s) Oral every 12 hours  dextrose 5% + sodium chloride 0.9%. 1000 milliLiter(s) (75 mL/Hr) IV Continuous <Continuous>  pantoprazole Infusion 8 mG/Hr (10 mL/Hr) IV Continuous <Continuous>  rosuvastatin 10 milliGRAM(s) Oral at bedtime  timolol 0.5% Solution 1 Drop(s) Both EYES at bedtime    MEDICATIONS  (PRN):  acetaminophen     Tablet .. 650 milliGRAM(s) Oral every 6 hours PRN Temp greater or equal to 38C (100.4F), Mild Pain (1 - 3)  melatonin 3 milliGRAM(s) Oral at bedtime PRN Insomnia  ondansetron Injectable 4 milliGRAM(s) IV Push every 6 hours PRN Nausea and/or Vomiting      Allergies    Gluten (Stomach Upset; Diarrhea)  No Known Drug Allergies    Intolerances    Lipitor (Other)      Vital Signs Last 24 Hrs  T(C): 36.2 (02 Nov 2024 08:51), Max: 37.1 (02 Nov 2024 00:15)  T(F): 97.2 (02 Nov 2024 08:51), Max: 98.7 (02 Nov 2024 00:15)  HR: 80 (02 Nov 2024 08:51) (58 - 80)  BP: 142/57 (02 Nov 2024 08:51) (125/62 - 157/64)  BP(mean): --  RR: 18 (02 Nov 2024 08:51) (16 - 18)  SpO2: 96% (02 Nov 2024 08:51) (95% - 100%)    Parameters below as of 02 Nov 2024 08:51  Patient On (Oxygen Delivery Method): room air        11-01 @ 07:01  -  11-02 @ 07:00  --------------------------------------------------------  IN: 1260 mL / OUT: 500 mL / NET: 760 mL          PHYSICAL EXAM:    General: ; in no acute distress  HEENT: MMM, conjunctiva and sclera clear  H-RRR  L-CTA  Gastrointestinal: Soft, non-tender non-distended; Normal bowel sounds; No rebound or guarding  Extremities: Normal range of motion, No clubbing, cyanosis or edema  Neurological: Alert and oriented x3  Skin: Warm and dry. No obvious rash      LABS:                        8.0    9.39  )-----------( 104      ( 01 Nov 2024 05:00 )             24.6     01 Nov 2024 05:00    135    |  103    |  15.0   ----------------------------<  104    4.3     |  20.0   |  0.88     Ca    8.6        01 Nov 2024 05:00    TPro  5.1    /  Alb  3.2    /  TBili  0.4    /  DBili  x      /  AST  13     /  ALT  10     /  AlkPhos  64     / Amylase x      /Lipase x      01 Nov 2024 05:00              RADIOLOGY & ADDITIONAL STUDIES:

## 2024-11-02 NOTE — PROGRESS NOTE ADULT - SUBJECTIVE AND OBJECTIVE BOX
WILLIAM BEHRLE  ----------------------------------------  The patient was seen earlier at bedside. Patient with anemia. Offered no complaints. Denied abdominal pain or nausea.    Vital Signs Last 24 Hrs  T(C): 36.2 (02 Nov 2024 08:51), Max: 37.1 (02 Nov 2024 00:15)  T(F): 97.2 (02 Nov 2024 08:51), Max: 98.7 (02 Nov 2024 00:15)  HR: 80 (02 Nov 2024 08:51) (58 - 80)  BP: 142/57 (02 Nov 2024 08:51) (125/62 - 157/64)  BP(mean): --  RR: 18 (02 Nov 2024 08:51) (16 - 18)  SpO2: 96% (02 Nov 2024 08:51) (95% - 100%)    Parameters below as of 02 Nov 2024 08:51  Patient On (Oxygen Delivery Method): room air    CAPILLARY BLOOD GLUCOSE  POCT Blood Glucose.: 146 mg/dL (02 Nov 2024 00:30)  POCT Blood Glucose.: 105 mg/dL (01 Nov 2024 17:05)    PHYSICAL EXAMINATION:  ----------------------------------------  General appearance: No acute distress, Awake, Alert  HEENT: Normocephalic, Atraumatic, Conjunctiva clear, EOMI  Neck: Supple, No JVD, No tenderness  Lungs: Breath sound equal bilaterally, No wheezes, No rales  Cardiovascular: S1S2, Regular rhythm  Abdomen: Soft, Nontender, Nondistended, No guarding/rebound, Positive bowel sounds  Extremities: No clubbing, No cyanosis, No edema, No calf tenderness  Neuro: Strength equal bilaterally, No tremors  Psychiatric: Appropriate mood, Normal affect    LABORATORY STUDIES:  ----------------------------------------             8.1    15.61 )-----------( 130      ( 02 Nov 2024 10:30 )             25.2     11-01    135  |  103  |  15.0  ----------------------------<  104[H]  4.3   |  20.0[L]  |  0.88    Ca    8.6      01 Nov 2024 05:00    TPro  5.1[L]  /  Alb  3.2[L]  /  TBili  0.4  /  DBili  x   /  AST  13  /  ALT  10  /  AlkPhos  64  11-01    LIVER FUNCTIONS - ( 01 Nov 2024 05:00 )  Alb: 3.2 g/dL / Pro: 5.1 g/dL / ALK PHOS: 64 U/L / ALT: 10 U/L / AST: 13 U/L / GGT: x           PT/INR - ( 01 Nov 2024 05:00 )   PT: 16.2 sec;   INR: 1.40 ratio      Urinalysis Basic - ( 01 Nov 2024 05:00 )  Color: x / Appearance: x / SG: x / pH: x  Gluc: 104 mg/dL / Ketone: x  / Bili: x / Urobili: x   Blood: x / Protein: x / Nitrite: x   Leuk Esterase: x / RBC: x / WBC x   Sq Epi: x / Non Sq Epi: x / Bacteria: x    MEDICATIONS  (STANDING):  carvedilol 6.25 milliGRAM(s) Oral every 12 hours  dextrose 5% + sodium chloride 0.9%. 1000 milliLiter(s) (75 mL/Hr) IV Continuous <Continuous>  glycerin Suppository - Adult 1 Suppository(s) Rectal at bedtime  pantoprazole Infusion 8 mG/Hr (10 mL/Hr) IV Continuous <Continuous>  rosuvastatin 10 milliGRAM(s) Oral at bedtime  senna 2 Tablet(s) Oral at bedtime  timolol 0.5% Solution 1 Drop(s) Both EYES at bedtime    MEDICATIONS  (PRN):  acetaminophen     Tablet .. 650 milliGRAM(s) Oral every 6 hours PRN Temp greater or equal to 38C (100.4F), Mild Pain (1 - 3)  melatonin 3 milliGRAM(s) Oral at bedtime PRN Insomnia  ondansetron Injectable 4 milliGRAM(s) IV Push every 6 hours PRN Nausea and/or Vomiting      ASSESSMENT / PLAN:  ----------------------------------------  79M with a history of coronary artery disease, gastroesophageal reflux, pancreatic mass, and glaucoma who presented to [facility] with hematemesis and melena found to have anemia requiring transfusion of packed red blood cells. CT of the abdomen was notable for an enlarged heterogenous pancreatic head, common bile duct dilation, duodenal thickening, gastric distention, and liver hypodensities. The patient was transfered t [facility] for evaluation and underwent endoscopy with findings of a duodenal mass.    Acute blood loss anemia  - Endoscopy noted old blood, ulcerated mucosa in the duodenal bulb, and duodenal mass  - Prior transfusion of packed red blood cells noted  - Monitoring hemoglobin levels  - On pantoprazole    Pancreatic / Duodenal mass  - Prior MRI (10/29) noted few small poorly delineated hepatic lesions, likely metastases, 4.5 x 3.5 cm infiltrative pancreatic head mass invading the proximal duodenum, interruption of the intrapancreatic portion of the common bile and main pancreatic ducts at the level of the mass with only minimal upstream ductal dilatation  - Biopsy results to be reviewed when available  - CA 19-9 level was elevated  - Clear liquid diet for now  - Pending decision in regards to further treatment with radiation or stent placement    Coronary artery disease / Hypertension  - Aspirin held due to bleeding  - For resumption of lisinopril    Glaucoma  - On timolol eye drops        Dispo: Anticipate eventual discharge home pending treatment for the pancreatic/duodenal mass WILLIAM BEHRLE  ----------------------------------------  The patient was seen earlier at bedside. Patient with anemia. Offered no complaints. Denied abdominal pain or nausea.    Vital Signs Last 24 Hrs  T(C): 36.2 (02 Nov 2024 08:51), Max: 37.1 (02 Nov 2024 00:15)  T(F): 97.2 (02 Nov 2024 08:51), Max: 98.7 (02 Nov 2024 00:15)  HR: 80 (02 Nov 2024 08:51) (58 - 80)  BP: 142/57 (02 Nov 2024 08:51) (125/62 - 157/64)  BP(mean): --  RR: 18 (02 Nov 2024 08:51) (16 - 18)  SpO2: 96% (02 Nov 2024 08:51) (95% - 100%)    Parameters below as of 02 Nov 2024 08:51  Patient On (Oxygen Delivery Method): room air    CAPILLARY BLOOD GLUCOSE  POCT Blood Glucose.: 146 mg/dL (02 Nov 2024 00:30)  POCT Blood Glucose.: 105 mg/dL (01 Nov 2024 17:05)    PHYSICAL EXAMINATION:  ----------------------------------------  General appearance: No acute distress, Awake, Alert  HEENT: Normocephalic, Atraumatic, Conjunctiva clear, EOMI  Neck: Supple, No JVD, No tenderness  Lungs: Breath sound equal bilaterally, No wheezes, No rales  Cardiovascular: S1S2, Regular rhythm  Abdomen: Soft, Nontender, Nondistended, No guarding/rebound, Positive bowel sounds  Extremities: No clubbing, No cyanosis, No edema, No calf tenderness  Neuro: Strength equal bilaterally, No tremors  Psychiatric: Appropriate mood, Normal affect    LABORATORY STUDIES:  ----------------------------------------             8.1    15.61 )-----------( 130      ( 02 Nov 2024 10:30 )             25.2     11-01    135  |  103  |  15.0  ----------------------------<  104[H]  4.3   |  20.0[L]  |  0.88    Ca    8.6      01 Nov 2024 05:00    TPro  5.1[L]  /  Alb  3.2[L]  /  TBili  0.4  /  DBili  x   /  AST  13  /  ALT  10  /  AlkPhos  64  11-01    LIVER FUNCTIONS - ( 01 Nov 2024 05:00 )  Alb: 3.2 g/dL / Pro: 5.1 g/dL / ALK PHOS: 64 U/L / ALT: 10 U/L / AST: 13 U/L / GGT: x           PT/INR - ( 01 Nov 2024 05:00 )   PT: 16.2 sec;   INR: 1.40 ratio      Urinalysis Basic - ( 01 Nov 2024 05:00 )  Color: x / Appearance: x / SG: x / pH: x  Gluc: 104 mg/dL / Ketone: x  / Bili: x / Urobili: x   Blood: x / Protein: x / Nitrite: x   Leuk Esterase: x / RBC: x / WBC x   Sq Epi: x / Non Sq Epi: x / Bacteria: x    MEDICATIONS  (STANDING):  carvedilol 6.25 milliGRAM(s) Oral every 12 hours  dextrose 5% + sodium chloride 0.9%. 1000 milliLiter(s) (75 mL/Hr) IV Continuous <Continuous>  glycerin Suppository - Adult 1 Suppository(s) Rectal at bedtime  pantoprazole Infusion 8 mG/Hr (10 mL/Hr) IV Continuous <Continuous>  rosuvastatin 10 milliGRAM(s) Oral at bedtime  senna 2 Tablet(s) Oral at bedtime  timolol 0.5% Solution 1 Drop(s) Both EYES at bedtime    MEDICATIONS  (PRN):  acetaminophen     Tablet .. 650 milliGRAM(s) Oral every 6 hours PRN Temp greater or equal to 38C (100.4F), Mild Pain (1 - 3)  melatonin 3 milliGRAM(s) Oral at bedtime PRN Insomnia  ondansetron Injectable 4 milliGRAM(s) IV Push every 6 hours PRN Nausea and/or Vomiting      ASSESSMENT / PLAN:  ----------------------------------------  79M with a history of coronary artery disease, gastroesophageal reflux, pancreatic mass, and glaucoma who presented to [facility] with hematemesis and melena found to have anemia requiring transfusion of packed red blood cells. CT of the abdomen was notable for an enlarged heterogenous pancreatic head, common bile duct dilation, duodenal thickening, gastric distention, and liver hypodensities. The patient was transfered t [facility] for evaluation and underwent endoscopy with findings of a duodenal mass.    Acute blood loss anemia  - Endoscopy noted old blood, ulcerated mucosa in the duodenal bulb, and duodenal mass  - Prior transfusion of packed red blood cells noted  - Monitoring hemoglobin levels  - On pantoprazole    Pancreatic / Duodenal mass  - Prior MRI (10/29) noted few small poorly delineated hepatic lesions, likely metastases, 4.5 x 3.5 cm infiltrative pancreatic head mass invading the proximal duodenum, interruption of the intrapancreatic portion of the common bile and main pancreatic ducts at the level of the mass with only minimal upstream ductal dilatation  - Biopsy results to be reviewed when available  - CA 19-9 level was elevated  - Clear liquid diet for now  - Pending decision in regards to further treatment with radiation or stent placement    Coronary artery disease / Hypertension  - Aspirin held due to bleeding  - For initiation of amlodipine  - Lisinopril and spironolactone previously held due to acute kidney injury    Glaucoma  - On timolol eye drops        Dispo: Anticipate eventual discharge home pending treatment for the pancreatic/duodenal mass

## 2024-11-02 NOTE — PROGRESS NOTE ADULT - SUBJECTIVE AND OBJECTIVE BOX
Subjective: no acute events overnight. Patient in good spirits. Denies any pain, nausea, vomiting.       MEDICATIONS  (STANDING):  carvedilol 6.25 milliGRAM(s) Oral every 12 hours  dextrose 5% + sodium chloride 0.9%. 1000 milliLiter(s) (75 mL/Hr) IV Continuous <Continuous>  pantoprazole Infusion 8 mG/Hr (10 mL/Hr) IV Continuous <Continuous>  rosuvastatin 10 milliGRAM(s) Oral at bedtime  timolol 0.5% Solution 1 Drop(s) Both EYES at bedtime    MEDICATIONS  (PRN):  acetaminophen     Tablet .. 650 milliGRAM(s) Oral every 6 hours PRN Temp greater or equal to 38C (100.4F), Mild Pain (1 - 3)  melatonin 3 milliGRAM(s) Oral at bedtime PRN Insomnia  ondansetron Injectable 4 milliGRAM(s) IV Push every 6 hours PRN Nausea and/or Vomiting      Vital Signs Last 24 Hrs  T(C): 37.1 (02 Nov 2024 00:15), Max: 37.1 (02 Nov 2024 00:15)  T(F): 98.7 (02 Nov 2024 00:15), Max: 98.7 (02 Nov 2024 00:15)  HR: 78 (02 Nov 2024 00:15) (58 - 79)  BP: 125/62 (02 Nov 2024 00:15) (115/53 - 157/64)  BP(mean): --  RR: 18 (02 Nov 2024 00:15) (14 - 18)  SpO2: 95% (02 Nov 2024 00:15) (95% - 100%)    Parameters below as of 02 Nov 2024 00:15  Patient On (Oxygen Delivery Method): room air        Physical Exam:    Constitutional: NAD  HEENT: , EOMI  Respiratory: Respirations non-labored, no accessory muscle use  Gastrointestinal: Soft, non-tender, non-distended  Neurological: A&O x 3      LABS:                        8.0    9.39  )-----------( 104      ( 01 Nov 2024 05:00 )             24.6     11-01    135  |  103  |  15.0  ----------------------------<  104[H]  4.3   |  20.0[L]  |  0.88    Ca    8.6      01 Nov 2024 05:00    TPro  5.1[L]  /  Alb  3.2[L]  /  TBili  0.4  /  DBili  x   /  AST  13  /  ALT  10  /  AlkPhos  64  11-01    PT/INR - ( 01 Nov 2024 05:00 )   PT: 16.2 sec;   INR: 1.40 ratio           Urinalysis Basic - ( 01 Nov 2024 05:00 )    Color: x / Appearance: x / SG: x / pH: x  Gluc: 104 mg/dL / Ketone: x  / Bili: x / Urobili: x   Blood: x / Protein: x / Nitrite: x   Leuk Esterase: x / RBC: x / WBC x   Sq Epi: x / Non Sq Epi: x / Bacteria: x

## 2024-11-02 NOTE — PROGRESS NOTE ADULT - ASSESSMENT
A: 80 YO M with pancreatic ca with liver mets    PLAN:  - Follow up hem/onc recs   - May require stenting by GI for palliative relief vs GJ   - F/u path results

## 2024-11-03 LAB
ALBUMIN SERPL ELPH-MCNC: 3.1 G/DL — LOW (ref 3.3–5.2)
ALP SERPL-CCNC: 51 U/L — SIGNIFICANT CHANGE UP (ref 40–120)
ALT FLD-CCNC: 10 U/L — SIGNIFICANT CHANGE UP
ANION GAP SERPL CALC-SCNC: 9 MMOL/L — SIGNIFICANT CHANGE UP (ref 5–17)
APTT BLD: 42.7 SEC — HIGH (ref 24.5–35.6)
AST SERPL-CCNC: 17 U/L — SIGNIFICANT CHANGE UP
BILIRUB SERPL-MCNC: 0.3 MG/DL — LOW (ref 0.4–2)
BUN SERPL-MCNC: 18.8 MG/DL — SIGNIFICANT CHANGE UP (ref 8–20)
CALCIUM SERPL-MCNC: 8.3 MG/DL — LOW (ref 8.4–10.5)
CHLORIDE SERPL-SCNC: 111 MMOL/L — HIGH (ref 96–108)
CO2 SERPL-SCNC: 20 MMOL/L — LOW (ref 22–29)
CREAT SERPL-MCNC: 0.89 MG/DL — SIGNIFICANT CHANGE UP (ref 0.5–1.3)
EGFR: 87 ML/MIN/1.73M2 — SIGNIFICANT CHANGE UP
GLUCOSE SERPL-MCNC: 108 MG/DL — HIGH (ref 70–99)
HCT VFR BLD CALC: 19.3 % — CRITICAL LOW (ref 39–50)
HCT VFR BLD CALC: 20.5 % — CRITICAL LOW (ref 39–50)
HCT VFR BLD CALC: 26.3 % — LOW (ref 39–50)
HCT VFR BLD CALC: 26.9 % — LOW (ref 39–50)
HGB BLD-MCNC: 6.2 G/DL — CRITICAL LOW (ref 13–17)
HGB BLD-MCNC: 6.5 G/DL — CRITICAL LOW (ref 13–17)
HGB BLD-MCNC: 8.5 G/DL — LOW (ref 13–17)
HGB BLD-MCNC: 8.6 G/DL — LOW (ref 13–17)
INR BLD: 1.55 RATIO — HIGH (ref 0.85–1.16)
MCHC RBC-ENTMCNC: 29.7 PG — SIGNIFICANT CHANGE UP (ref 27–34)
MCHC RBC-ENTMCNC: 30 PG — SIGNIFICANT CHANGE UP (ref 27–34)
MCHC RBC-ENTMCNC: 30.1 PG — SIGNIFICANT CHANGE UP (ref 27–34)
MCHC RBC-ENTMCNC: 30.5 PG — SIGNIFICANT CHANGE UP (ref 27–34)
MCHC RBC-ENTMCNC: 31.7 G/DL — LOW (ref 32–36)
MCHC RBC-ENTMCNC: 32 G/DL — SIGNIFICANT CHANGE UP (ref 32–36)
MCHC RBC-ENTMCNC: 32.1 G/DL — SIGNIFICANT CHANGE UP (ref 32–36)
MCHC RBC-ENTMCNC: 32.3 G/DL — SIGNIFICANT CHANGE UP (ref 32–36)
MCV RBC AUTO: 93.6 FL — SIGNIFICANT CHANGE UP (ref 80–100)
MCV RBC AUTO: 93.7 FL — SIGNIFICANT CHANGE UP (ref 80–100)
MCV RBC AUTO: 93.7 FL — SIGNIFICANT CHANGE UP (ref 80–100)
MCV RBC AUTO: 94.3 FL — SIGNIFICANT CHANGE UP (ref 80–100)
PLATELET # BLD AUTO: 102 K/UL — LOW (ref 150–400)
PLATELET # BLD AUTO: 105 K/UL — LOW (ref 150–400)
PLATELET # BLD AUTO: 86 K/UL — LOW (ref 150–400)
PLATELET # BLD AUTO: 89 K/UL — LOW (ref 150–400)
POTASSIUM SERPL-MCNC: 4.3 MMOL/L — SIGNIFICANT CHANGE UP (ref 3.5–5.3)
POTASSIUM SERPL-SCNC: 4.3 MMOL/L — SIGNIFICANT CHANGE UP (ref 3.5–5.3)
PROT SERPL-MCNC: 4.5 G/DL — LOW (ref 6.6–8.7)
PROTHROM AB SERPL-ACNC: 17.9 SEC — HIGH (ref 9.9–13.4)
RBC # BLD: 2.06 M/UL — LOW (ref 4.2–5.8)
RBC # BLD: 2.19 M/UL — LOW (ref 4.2–5.8)
RBC # BLD: 2.79 M/UL — LOW (ref 4.2–5.8)
RBC # BLD: 2.87 M/UL — LOW (ref 4.2–5.8)
RBC # FLD: 14.9 % — HIGH (ref 10.3–14.5)
RBC # FLD: 15.1 % — HIGH (ref 10.3–14.5)
SODIUM SERPL-SCNC: 140 MMOL/L — SIGNIFICANT CHANGE UP (ref 135–145)
WBC # BLD: 12.92 K/UL — HIGH (ref 3.8–10.5)
WBC # BLD: 13.48 K/UL — HIGH (ref 3.8–10.5)
WBC # BLD: 8.63 K/UL — SIGNIFICANT CHANGE UP (ref 3.8–10.5)
WBC # BLD: 8.95 K/UL — SIGNIFICANT CHANGE UP (ref 3.8–10.5)
WBC # FLD AUTO: 12.92 K/UL — HIGH (ref 3.8–10.5)
WBC # FLD AUTO: 13.48 K/UL — HIGH (ref 3.8–10.5)
WBC # FLD AUTO: 8.63 K/UL — SIGNIFICANT CHANGE UP (ref 3.8–10.5)
WBC # FLD AUTO: 8.95 K/UL — SIGNIFICANT CHANGE UP (ref 3.8–10.5)

## 2024-11-03 PROCEDURE — 99233 SBSQ HOSP IP/OBS HIGH 50: CPT

## 2024-11-03 PROCEDURE — 99232 SBSQ HOSP IP/OBS MODERATE 35: CPT

## 2024-11-03 RX ORDER — POLYETHYLENE GLYCOL 3350 17 G/17G
17 POWDER, FOR SOLUTION ORAL DAILY
Refills: 0 | Status: DISCONTINUED | OUTPATIENT
Start: 2024-11-03 | End: 2024-11-07

## 2024-11-03 RX ORDER — METOCLOPRAMIDE HCL 10 MG
10 TABLET ORAL ONCE
Refills: 0 | Status: COMPLETED | OUTPATIENT
Start: 2024-11-03 | End: 2024-11-03

## 2024-11-03 RX ADMIN — ONDANSETRON HYDROCHLORIDE 4 MILLIGRAM(S): 2 INJECTION, SOLUTION INTRAMUSCULAR; INTRAVENOUS at 16:47

## 2024-11-03 RX ADMIN — Medication 10 MILLIGRAM(S): at 20:01

## 2024-11-03 RX ADMIN — PANTOPRAZOLE SODIUM 10 MG/HR: 40 TABLET, DELAYED RELEASE ORAL at 02:47

## 2024-11-03 RX ADMIN — Medication 650 MILLIGRAM(S): at 17:24

## 2024-11-03 RX ADMIN — CARVEDILOL 6.25 MILLIGRAM(S): 25 TABLET, FILM COATED ORAL at 18:07

## 2024-11-03 RX ADMIN — GLYCERIN 1 SUPPOSITORY(S): 2.1 SUPPOSITORY RECTAL at 22:20

## 2024-11-03 RX ADMIN — Medication 2.5 MILLIGRAM(S): at 05:34

## 2024-11-03 RX ADMIN — Medication 75 MILLILITER(S): at 02:47

## 2024-11-03 RX ADMIN — Medication 650 MILLIGRAM(S): at 16:24

## 2024-11-03 RX ADMIN — POLYETHYLENE GLYCOL 3350 17 GRAM(S): 17 POWDER, FOR SOLUTION ORAL at 14:35

## 2024-11-03 RX ADMIN — Medication 1 DROP(S): at 22:16

## 2024-11-03 RX ADMIN — CARVEDILOL 6.25 MILLIGRAM(S): 25 TABLET, FILM COATED ORAL at 05:34

## 2024-11-03 NOTE — PROGRESS NOTE ADULT - SUBJECTIVE AND OBJECTIVE BOX
Patient is a 79y old  Male who presents with a chief complaint of Acute blood loss anemia due to GI bleeding from duodenal ulcer, pancreatic mass (03 Nov 2024 04:53)      HPI:  79yoM hx CAD, HTN, glaucoma who presented to [facility] on 10/27 with few days of hematemesis and melena .  + duodenal mass on EUS seen on Friday . FNA pending. Patient  had no nausea or vomiting with clear liquids, though feels full .  NO BM with suppository yesterday . NO abdominal pain. Hgb dropped from 8.1 to 6.5 , No melena.             REVIEW OF SYSTEMS:  Constitutional: No fever, weight loss or fatigue  ENMT:  No difficulty hearing, tinnitus, vertigo; No sinus or throat pain  Respiratory: No cough, wheezing, chills or hemoptysis  Cardiovascular: No chest pain, palpitations, dizziness or leg swelling  Gastrointestinal: No abdominal or epigastric pain. No nausea, vomiting or hematemesis; No diarrhea or constipation. No melena or hematochezia.  Skin: No itching, burning, rashes or lesions   Musculoskeletal: No joint pain or swelling; No muscle, back or extremity pain    PAST MEDICAL & SURGICAL HISTORY:  CAD (coronary artery disease)      Hypertension      Glaucoma      Pancreatic mass      Duodenal ulcer      GERD (gastroesophageal reflux disease)      S/P inguinal hernia repair      H/O aortic valve replacement          FAMILY HISTORY:  FHx: coronary artery disease        SOCIAL HISTORY:  Smoking Status: [ ] Current, [ ] Former, [ ] Never  Pack Years:  [  ] EtOH-no  [  ] IVDA    MEDICATIONS:  MEDICATIONS  (STANDING):  amLODIPine   Tablet 2.5 milliGRAM(s) Oral daily  carvedilol 6.25 milliGRAM(s) Oral every 12 hours  glycerin Suppository - Adult 1 Suppository(s) Rectal at bedtime  pantoprazole Infusion 8 mG/Hr (10 mL/Hr) IV Continuous <Continuous>  rosuvastatin 10 milliGRAM(s) Oral at bedtime  senna 2 Tablet(s) Oral at bedtime  tamsulosin 0.4 milliGRAM(s) Oral at bedtime  timolol 0.5% Solution 1 Drop(s) Both EYES at bedtime    MEDICATIONS  (PRN):  acetaminophen     Tablet .. 650 milliGRAM(s) Oral every 6 hours PRN Temp greater or equal to 38C (100.4F), Mild Pain (1 - 3)  melatonin 3 milliGRAM(s) Oral at bedtime PRN Insomnia  ondansetron Injectable 4 milliGRAM(s) IV Push every 6 hours PRN Nausea and/or Vomiting      Allergies    Gluten (Stomach Upset; Diarrhea)  No Known Drug Allergies    Intolerances    Lipitor (Other)      Vital Signs Last 24 Hrs  T(C): 36.8 (03 Nov 2024 07:30), Max: 36.8 (03 Nov 2024 07:30)  T(F): 98.2 (03 Nov 2024 07:30), Max: 98.2 (03 Nov 2024 07:30)  HR: 63 (03 Nov 2024 07:30) (61 - 68)  BP: 119/50 (03 Nov 2024 07:30) (115/60 - 125/58)  BP(mean): --  RR: 18 (03 Nov 2024 07:30) (18 - 18)  SpO2: 98% (03 Nov 2024 07:30) (97% - 99%)    Parameters below as of 03 Nov 2024 07:30  Patient On (Oxygen Delivery Method): room air        11-02 @ 08:01  -  11-03 @ 07:00  --------------------------------------------------------  IN: 2760 mL / OUT: 600 mL / NET: 2160 mL          PHYSICAL EXAM:    General: ; in no acute distress  HEENT: MMM, conjunctiva and sclera clear  H-RRR  L-CTA  Gastrointestinal: Soft, non-tender non-distended; Normal bowel sounds; No rebound or guarding  Extremities: Normal range of motion, No clubbing, cyanosis or edema  Neurological: Alert and oriented x3  Skin: Warm and dry. No obvious rash      LABS:                        6.2    8.63  )-----------( 86       ( 03 Nov 2024 07:00 )             19.3     03 Nov 2024 04:50    140    |  111    |  18.8   ----------------------------<  108    4.3     |  20.0   |  0.89     Ca    8.3        03 Nov 2024 04:50    TPro  4.5    /  Alb  3.1    /  TBili  0.3    /  DBili  x      /  AST  17     /  ALT  10     /  AlkPhos  51     / Amylase x      /Lipase x      03 Nov 2024 04:50              RADIOLOGY & ADDITIONAL STUDIES:

## 2024-11-03 NOTE — CHART NOTE - NSCHARTNOTEFT_GEN_A_CORE
Notified by RN for patient with critical Hg 6.5 this morning, from Hg 8.0 yesterday   Patient asymptomatic, VSS, and NAD   Type and screen completed Nov 1st   Stat repeat CBC ordered   Rest of plan as per hospitalist   Continue to monitor   RN to notify provider with any changes in patient status

## 2024-11-03 NOTE — PROGRESS NOTE ADULT - NS ATTEST RISK PROBLEM GEN_ALL_CORE FT
duodenal mass  awaiting FNA results  hemoglobin dropped to 6.5. Mass was friable on Friday. Hemospray applied  will get blood transfusion today. CBC ordered to check post transfusion results  Dr Robles and Surg onc to discuss management tomorrow - ? radiation, stent , surgery    constipation   NO BM with the suppository  will order miralax 17 g bid
Duodenal lesion     pancreas looked normal  FNA pending  clear liquid diet ordered  will order CBC for today and tomorrow. Hgb 8 yesterday, but duodenal lesion was friable and oozing  On monday , Dr Robles will speak to Surg onc regarding the plan- Surgery , vs, radiation, ? duodenal stenting      Constipation will give suppository and colace. If no better then will do miralax

## 2024-11-03 NOTE — CHART NOTE - NSCHARTNOTEFT_GEN_A_CORE
RN notified that patient had one episode of what looked like coffee ground emesis. Nurse estimated around 150ml. Patient seen and examined. Admits to mild pain (3/10) in mid-abdominal region. Vital signs stable, in NAD. Patient on clear-liquid diet and admits to tasting the grape juice he had drank earlier.      Vital Signs Last 24 Hrs  T(C): 36.5 (03 Nov 2024 18:58), Max: 36.8 (03 Nov 2024 07:30)  T(F): 97.7 (03 Nov 2024 18:58), Max: 98.2 (03 Nov 2024 07:30)  HR: 62 (03 Nov 2024 18:58) (61 - 73)  BP: 122/60 (03 Nov 2024 18:58) (115/48 - 137/65)  BP(mean): --  RR: 18 (03 Nov 2024 18:58) (16 - 18)  SpO2: 98% (03 Nov 2024 18:58) (96% - 100%)    Parameters below as of 03 Nov 2024 18:58  Patient On (Oxygen Delivery Method): room air    Physical Exam:  CONSTITUTIONAL: Well groomed, no apparent distress  GI: Soft, ND, no rebound, no guarding; bowel sounds auscultated. Mild pain on palpation of mid-abdominal region    Assessment:   79M with a history of coronary artery disease, gastroesophageal reflux, pancreatic mass, and glaucoma who presented to [facility] with hematemesis and melena found to have anemia requiring transfusion of packed red blood cells. CT of the abdomen was notable for an enlarged heterogenous pancreatic head, common bile duct dilation, duodenal thickening, gastric distention, and liver hypodensities. The patient was transferred to [facility] for evaluation and underwent endoscopy with findings of a duodenal mass.    Plan:  - Reglan ordered  - CBC  - Will order pain meds if pain increases  - Discussed with RN to notify if condition worsens 79M with a history of coronary artery disease, gastroesophageal reflux, pancreatic mass, and glaucoma who presented to [facility] with hematemesis and melena found to have anemia requiring transfusion of packed red blood cells. CT of the abdomen was notable for an enlarged heterogenous pancreatic head, common bile duct dilation, duodenal thickening, gastric distention, and liver hypodensities. The patient was transferred to [facility] for evaluation and underwent endoscopy with findings of a duodenal mass.    RN notified that patient had one episode of what looked like coffee ground emesis. Nurse estimated around 150ml. Patient seen and examined. Admits to mild pain (3/10) in mid-abdominal region. Vital signs stable, in NAD. Patient on clear-liquid diet and admits to tasting the grape juice he had drank earlier.      Vital Signs Last 24 Hrs  T(C): 36.5 (03 Nov 2024 18:58), Max: 36.8 (03 Nov 2024 07:30)  T(F): 97.7 (03 Nov 2024 18:58), Max: 98.2 (03 Nov 2024 07:30)  HR: 62 (03 Nov 2024 18:58) (61 - 73)  BP: 122/60 (03 Nov 2024 18:58) (115/48 - 137/65)  BP(mean): --  RR: 18 (03 Nov 2024 18:58) (16 - 18)  SpO2: 98% (03 Nov 2024 18:58) (96% - 100%)    Parameters below as of 03 Nov 2024 18:58  Patient On (Oxygen Delivery Method): room air    Physical Exam:  CONSTITUTIONAL: Well groomed, no apparent distress  GI: Soft, ND, no rebound, no guarding; bowel sounds auscultated. Mild pain on palpation of mid-abdominal region    A/P:  - Patient with questionable coffee ground emesis, admitted with hematemesis and melena. EGD performed. VSS, NAD  - Reglan ordered  - CBC  - Will order pain meds if pain increases  - Discussed with RN to notify if condition worsens

## 2024-11-03 NOTE — PROGRESS NOTE ADULT - PROBLEM SELECTOR PLAN 1
pancreas looked normal  FNA pending  clear liquid diet ordered  will order CBC for today and tomorrow. Hgb 8 yesterday, but duodenal lesion was friable and oozing  On monday , Dr Robles will speak to Surg onc regarding the plan- Surgery , vs, radiation, ? duodenal stenting
awaiting FNA results  hemoglobin dropped to 6.5. Mass was friable on Friday. Hemospray applied  will get blood transfusion today. CBC ordered to check post transfusion results  Dr Robles and Surg onc to discuss management tomorrow - ? radiation, stent , surgery

## 2024-11-03 NOTE — PROGRESS NOTE ADULT - SUBJECTIVE AND OBJECTIVE BOX
WILLIAM BEHRLE  ----------------------------------------  The patient was seen earlier at bedside. Patient with anemia. Tolerating liquid diet. Denied abdominal pain.    Vital Signs Last 24 Hrs  T(C): 36.3 (03 Nov 2024 10:40), Max: 36.8 (03 Nov 2024 07:30)  T(F): 97.4 (03 Nov 2024 10:40), Max: 98.2 (03 Nov 2024 07:30)  HR: 63 (03 Nov 2024 10:40) (61 - 68)  BP: 126/48 (03 Nov 2024 10:40) (115/48 - 126/48)  BP(mean): --  RR: 16 (03 Nov 2024 10:40) (16 - 18)  SpO2: 100% (03 Nov 2024 10:40) (97% - 100%)    Parameters below as of 03 Nov 2024 10:40  Patient On (Oxygen Delivery Method): room air    PHYSICAL EXAMINATION:  ----------------------------------------  General appearance: No acute distress, Awake, Alert  HEENT: Normocephalic, Atraumatic, Conjunctiva clear, EOMI  Neck: Supple, No JVD, No tenderness  Lungs: Breath sound equal bilaterally, No wheezes, No rales  Cardiovascular: S1S2, Regular rhythm  Abdomen: Soft, Nontender, Nondistended, No guarding/rebound, Positive bowel sounds  Extremities: No clubbing, No cyanosis, No edema, No calf tenderness  Neuro: Strength equal bilaterally, No tremors  Psychiatric: Appropriate mood, Normal affect    LABORATORY STUDIES:  ----------------------------------------             6.2    8.63  )-----------( 86       ( 03 Nov 2024 07:00 )             19.3     11-03    140  |  111[H]  |  18.8  ----------------------------<  108[H]  4.3   |  20.0[L]  |  0.89    Ca    8.3[L]      03 Nov 2024 04:50    TPro  4.5[L]  /  Alb  3.1[L]  /  TBili  0.3[L]  /  DBili  x   /  AST  17  /  ALT  10  /  AlkPhos  51  11-03    LIVER FUNCTIONS - ( 03 Nov 2024 04:50 )  Alb: 3.1 g/dL / Pro: 4.5 g/dL / ALK PHOS: 51 U/L / ALT: 10 U/L / AST: 17 U/L / GGT: x           PT/INR - ( 03 Nov 2024 04:50 )   PT: 17.9 sec;   INR: 1.55 ratio    PTT - ( 03 Nov 2024 04:50 )  PTT:42.7 sec    Urinalysis Basic - ( 03 Nov 2024 04:50 )  Color: x / Appearance: x / SG: x / pH: x  Gluc: 108 mg/dL / Ketone: x  / Bili: x / Urobili: x   Blood: x / Protein: x / Nitrite: x   Leuk Esterase: x / RBC: x / WBC x   Sq Epi: x / Non Sq Epi: x / Bacteria: x    MEDICATIONS  (STANDING):  amLODIPine   Tablet 2.5 milliGRAM(s) Oral daily  carvedilol 6.25 milliGRAM(s) Oral every 12 hours  glycerin Suppository - Adult 1 Suppository(s) Rectal at bedtime  pantoprazole Infusion 8 mG/Hr (10 mL/Hr) IV Continuous <Continuous>  polyethylene glycol 3350 17 Gram(s) Oral daily  polyethylene glycol 3350 17 Gram(s) Oral daily  rosuvastatin 10 milliGRAM(s) Oral at bedtime  senna 2 Tablet(s) Oral at bedtime  tamsulosin 0.4 milliGRAM(s) Oral at bedtime  timolol 0.5% Solution 1 Drop(s) Both EYES at bedtime    MEDICATIONS  (PRN):  acetaminophen     Tablet .. 650 milliGRAM(s) Oral every 6 hours PRN Temp greater or equal to 38C (100.4F), Mild Pain (1 - 3)  melatonin 3 milliGRAM(s) Oral at bedtime PRN Insomnia  ondansetron Injectable 4 milliGRAM(s) IV Push every 6 hours PRN Nausea and/or Vomiting      ASSESSMENT / PLAN:  ----------------------------------------  79M with a history of coronary artery disease, gastroesophageal reflux, pancreatic mass, and glaucoma who presented to [facility] with hematemesis and melena found to have anemia requiring transfusion of packed red blood cells. CT of the abdomen was notable for an enlarged heterogenous pancreatic head, common bile duct dilation, duodenal thickening, gastric distention, and liver hypodensities. The patient was transfered to [facility] for evaluation and underwent endoscopy with findings of a duodenal mass.    Acute blood loss anemia  - Endoscopy noted old blood, ulcerated mucosa in the duodenal bulb, and duodenal mass  - Monitoring hemoglobin levels, decreased  - On pantoprazole  - For transfusion of packed red blood cells  - Patient tolerated transfusion at [facility], agreeable to transfusion    Pancreatic / Duodenal mass  - Prior MRI (10/29) noted few small poorly delineated hepatic lesions, likely metastases, 4.5 x 3.5 cm infiltrative pancreatic head mass invading the proximal duodenum, interruption of the intrapancreatic portion of the common bile and main pancreatic ducts at the level of the mass with only minimal upstream ductal dilatation  - Biopsy results to be reviewed when available  - CA 19-9 level was elevated  - Clear liquid diet for now  - Pending decision in regards to further treatment options (radiation, stent placement, surgery)    Coronary artery disease / Hypertension  - Aspirin held due to bleeding  - On amlodipine  - Lisinopril and spironolactone previously held due to acute kidney injury    Glaucoma  - On timolol eye drops        Dispo: Anticipate eventual discharge home pending decision on treatment for the pancreatic/duodenal mass

## 2024-11-03 NOTE — PROGRESS NOTE ADULT - PROBLEM SELECTOR PLAN 2
NO BM with the suppository  will order miralax 17 g bid
will give suppository and colace. If no better then will do miralax

## 2024-11-03 NOTE — PROGRESS NOTE ADULT - SUBJECTIVE AND OBJECTIVE BOX
Subjective: Patient seen and examined at bedside, no current complaints, no overnight events.       MEDICATIONS  (STANDING):  amLODIPine   Tablet 2.5 milliGRAM(s) Oral daily  carvedilol 6.25 milliGRAM(s) Oral every 12 hours  dextrose 5% + sodium chloride 0.9%. 1000 milliLiter(s) (75 mL/Hr) IV Continuous <Continuous>  glycerin Suppository - Adult 1 Suppository(s) Rectal at bedtime  pantoprazole Infusion 8 mG/Hr (10 mL/Hr) IV Continuous <Continuous>  rosuvastatin 10 milliGRAM(s) Oral at bedtime  senna 2 Tablet(s) Oral at bedtime  tamsulosin 0.4 milliGRAM(s) Oral at bedtime  timolol 0.5% Solution 1 Drop(s) Both EYES at bedtime    MEDICATIONS  (PRN):  acetaminophen     Tablet .. 650 milliGRAM(s) Oral every 6 hours PRN Temp greater or equal to 38C (100.4F), Mild Pain (1 - 3)  melatonin 3 milliGRAM(s) Oral at bedtime PRN Insomnia  ondansetron Injectable 4 milliGRAM(s) IV Push every 6 hours PRN Nausea and/or Vomiting      Vital Signs Last 24 Hrs  T(C): 36.4 (03 Nov 2024 00:21), Max: 36.7 (02 Nov 2024 20:59)  T(F): 97.5 (03 Nov 2024 00:21), Max: 98 (02 Nov 2024 20:59)  HR: 67 (03 Nov 2024 00:21) (62 - 80)  BP: 120/50 (03 Nov 2024 00:21) (115/60 - 142/57)  BP(mean): --  RR: 18 (03 Nov 2024 00:21) (18 - 18)  SpO2: 98% (03 Nov 2024 00:21) (96% - 99%)    Parameters below as of 03 Nov 2024 00:21  Patient On (Oxygen Delivery Method): room air        Physical Exam:    Constitutional: NAD  HEENT: PERRL, EOMI  Respiratory: Respirations non-labored, no accessory muscle use  Gastrointestinal: non-distended  Neurological: A&O x 3      LABS:                        8.1    15.61 )-----------( 130      ( 02 Nov 2024 10:30 )             25.2     11-01    135  |  103  |  15.0  ----------------------------<  104[H]  4.3   |  20.0[L]  |  0.88    Ca    8.6      01 Nov 2024 05:00    TPro  5.1[L]  /  Alb  3.2[L]  /  TBili  0.4  /  DBili  x   /  AST  13  /  ALT  10  /  AlkPhos  64  11-01    PT/INR - ( 01 Nov 2024 05:00 )   PT: 16.2 sec;   INR: 1.40 ratio           Urinalysis Basic - ( 01 Nov 2024 05:00 )    Color: x / Appearance: x / SG: x / pH: x  Gluc: 104 mg/dL / Ketone: x  / Bili: x / Urobili: x   Blood: x / Protein: x / Nitrite: x   Leuk Esterase: x / RBC: x / WBC x   Sq Epi: x / Non Sq Epi: x / Bacteria: x        A: 80 YO M with pancreatic cancer with liver mets    PLAN:  - Follow up hem/onc recs   - May require stenting by GI for palliative relief vs GJ   - F/u path results

## 2024-11-04 LAB
ANION GAP SERPL CALC-SCNC: 9 MMOL/L — SIGNIFICANT CHANGE UP (ref 5–17)
BLD GP AB SCN SERPL QL: SIGNIFICANT CHANGE UP
BUN SERPL-MCNC: 25.4 MG/DL — HIGH (ref 8–20)
CALCIUM SERPL-MCNC: 8.3 MG/DL — LOW (ref 8.4–10.5)
CHLORIDE SERPL-SCNC: 110 MMOL/L — HIGH (ref 96–108)
CO2 SERPL-SCNC: 20 MMOL/L — LOW (ref 22–29)
CREAT SERPL-MCNC: 0.83 MG/DL — SIGNIFICANT CHANGE UP (ref 0.5–1.3)
EGFR: 88 ML/MIN/1.73M2 — SIGNIFICANT CHANGE UP
GLUCOSE SERPL-MCNC: 96 MG/DL — SIGNIFICANT CHANGE UP (ref 70–99)
HCT VFR BLD CALC: 20.3 % — CRITICAL LOW (ref 39–50)
HCT VFR BLD CALC: 30.5 % — LOW (ref 39–50)
HGB BLD-MCNC: 6.6 G/DL — CRITICAL LOW (ref 13–17)
HGB BLD-MCNC: 9.7 G/DL — LOW (ref 13–17)
MAGNESIUM SERPL-MCNC: 1.8 MG/DL — SIGNIFICANT CHANGE UP (ref 1.8–2.6)
MCHC RBC-ENTMCNC: 30.5 PG — SIGNIFICANT CHANGE UP (ref 27–34)
MCHC RBC-ENTMCNC: 30.6 PG — SIGNIFICANT CHANGE UP (ref 27–34)
MCHC RBC-ENTMCNC: 31.8 G/DL — LOW (ref 32–36)
MCHC RBC-ENTMCNC: 32.5 G/DL — SIGNIFICANT CHANGE UP (ref 32–36)
MCV RBC AUTO: 94 FL — SIGNIFICANT CHANGE UP (ref 80–100)
MCV RBC AUTO: 95.9 FL — SIGNIFICANT CHANGE UP (ref 80–100)
PLATELET # BLD AUTO: 82 K/UL — LOW (ref 150–400)
PLATELET # BLD AUTO: 92 K/UL — LOW (ref 150–400)
POTASSIUM SERPL-MCNC: 4.8 MMOL/L — SIGNIFICANT CHANGE UP (ref 3.5–5.3)
POTASSIUM SERPL-SCNC: 4.8 MMOL/L — SIGNIFICANT CHANGE UP (ref 3.5–5.3)
RBC # BLD: 2.16 M/UL — LOW (ref 4.2–5.8)
RBC # BLD: 3.18 M/UL — LOW (ref 4.2–5.8)
RBC # FLD: 14.9 % — HIGH (ref 10.3–14.5)
RBC # FLD: 15 % — HIGH (ref 10.3–14.5)
SODIUM SERPL-SCNC: 138 MMOL/L — SIGNIFICANT CHANGE UP (ref 135–145)
WBC # BLD: 13.76 K/UL — HIGH (ref 3.8–10.5)
WBC # BLD: 7.34 K/UL — SIGNIFICANT CHANGE UP (ref 3.8–10.5)
WBC # FLD AUTO: 13.76 K/UL — HIGH (ref 3.8–10.5)
WBC # FLD AUTO: 7.34 K/UL — SIGNIFICANT CHANGE UP (ref 3.8–10.5)

## 2024-11-04 PROCEDURE — 74174 CTA ABD&PLVS W/CONTRAST: CPT | Mod: 26

## 2024-11-04 PROCEDURE — 99233 SBSQ HOSP IP/OBS HIGH 50: CPT

## 2024-11-04 PROCEDURE — 99232 SBSQ HOSP IP/OBS MODERATE 35: CPT

## 2024-11-04 RX ADMIN — POLYETHYLENE GLYCOL 3350 17 GRAM(S): 17 POWDER, FOR SOLUTION ORAL at 12:00

## 2024-11-04 RX ADMIN — CARVEDILOL 6.25 MILLIGRAM(S): 25 TABLET, FILM COATED ORAL at 17:03

## 2024-11-04 RX ADMIN — PANTOPRAZOLE SODIUM 10 MG/HR: 40 TABLET, DELAYED RELEASE ORAL at 17:16

## 2024-11-04 RX ADMIN — Medication 75 MILLILITER(S): at 23:57

## 2024-11-04 RX ADMIN — Medication 0.4 MILLIGRAM(S): at 21:39

## 2024-11-04 RX ADMIN — Medication 10 MILLIGRAM(S): at 21:40

## 2024-11-04 RX ADMIN — Medication 1 DROP(S): at 21:40

## 2024-11-04 RX ADMIN — PANTOPRAZOLE SODIUM 10 MG/HR: 40 TABLET, DELAYED RELEASE ORAL at 06:30

## 2024-11-04 RX ADMIN — Medication 75 MILLILITER(S): at 13:22

## 2024-11-04 NOTE — PROGRESS NOTE ADULT - SUBJECTIVE AND OBJECTIVE BOX
Chief Complaint: This is a 79y old man patient being seen in follow-up consultation for acute blood loss anemia due to GI bleeding from duodenal ulcer, pancreatic mass    Interval HPI / 24H events:  Patient seen and evaluated at bedside, reporting no complaints. Patient denies fever, chills, abdominal pain, nausea, vomiting, chest pain, palpitation, hematemesis, hematochezia, melena. Hemoglobin 6.6 gm this morning. Transfusing 1 units of packed RBCs.     Review of Systems:  . Constitutional: No fever, chills  . HEENT: Negative  · Respiratory and Thorax: No shortness of breath, no cough  · Cardiovascular: No chest pain, palpitation, no dizziness  · Gastrointestinal: see above  · Genitourinary: No hematuria  · Musculoskeletal: Negative  · Neurological: negative  · Psychiatric: no agitation, no anxiety    PAST MEDICAL/SURGICAL HISTORY:  CAD (coronary artery disease)    Hypertension    Glaucoma    Pancreatic mass    Duodenal ulcer    GERD (gastroesophageal reflux disease)    S/P inguinal hernia repair    H/O aortic valve replacement      MEDICATIONS  (STANDING):  amLODIPine   Tablet 2.5 milliGRAM(s) Oral daily  carvedilol 6.25 milliGRAM(s) Oral every 12 hours  glycerin Suppository - Adult 1 Suppository(s) Rectal at bedtime  pantoprazole Infusion 8 mG/Hr (10 mL/Hr) IV Continuous <Continuous>  polyethylene glycol 3350 17 Gram(s) Oral daily  polyethylene glycol 3350 17 Gram(s) Oral daily  rosuvastatin 10 milliGRAM(s) Oral at bedtime  senna 2 Tablet(s) Oral at bedtime  tamsulosin 0.4 milliGRAM(s) Oral at bedtime  timolol 0.5% Solution 1 Drop(s) Both EYES at bedtime    MEDICATIONS  (PRN):  acetaminophen     Tablet .. 650 milliGRAM(s) Oral every 6 hours PRN Temp greater or equal to 38C (100.4F), Mild Pain (1 - 3)  melatonin 3 milliGRAM(s) Oral at bedtime PRN Insomnia  ondansetron Injectable 4 milliGRAM(s) IV Push every 6 hours PRN Nausea and/or Vomiting    Gluten (Stomach Upset; Diarrhea)  No Known Drug Allergies  Lipitor (Other)    T(C): 36.3 (11-04-24 @ 06:30), Max: 37.1 (11-04-24 @ 00:44)  HR: 65 (11-04-24 @ 06:30) (62 - 74)  BP: 112/62 (11-04-24 @ 06:30) (104/47 - 137/65)  RR: 16 (11-04-24 @ 06:30) (16 - 18)  SpO2: 98% (11-04-24 @ 06:30) (96% - 100%)    I&O's Summary    03 Nov 2024 07:01  -  04 Nov 2024 07:00  --------------------------------------------------------  IN: 1178 mL / OUT: 1250 mL / NET: -72 mL      PHYSICAL EXAM:    Constitutional: No acute distress  Neuro: Awake alert, oriented  HEENT: anicteric sclerae  CV: regular rate, regular rhythm  Pulm/chest: lung sounds diminished bilaterally, no accessory muscle use noted  Abd: soft, nontender, nondistended +bowel sounds. No rigidity, rebound tenderness, or guarding  Ext: no edema  Skin: warm, no jaundice   Psych: calm, cooperative      LABS:               6.6    7.34  )-----------( 82       ( 11-04 @ 04:47 )             20.3                8.5    13.48 )-----------( 102      ( 11-03 @ 20:05 )             26.3                8.6    12.92 )-----------( 105      ( 11-03 @ 17:45 )             26.9                6.2    8.63  )-----------( 86       ( 11-03 @ 07:00 )             19.3                6.5    8.95  )-----------( 89       ( 11-03 @ 04:50 )             20.5                8.1    15.61 )-----------( 130      ( 11-02 @ 10:30 )             25.2       11-04    138  |  110[H]  |  25.4[H]  ----------------------------<  96  4.8   |  20.0[L]  |  0.83    Ca    8.3[L]      04 Nov 2024 04:47  Mg     1.8     11-04    TPro  4.5[L]  /  Alb  3.1[L]  /  TBili  0.3[L]  /  DBili  x   /  AST  17  /  ALT  10  /  AlkPhos  51  11-03    LIVER FUNCTIONS - ( 03 Nov 2024 04:50 )  Alb: 3.1 g/dL / Pro: 4.5 g/dL / ALK PHOS: 51 U/L / ALT: 10 U/L / AST: 17 U/L / GGT: x               PT/INR - ( 03 Nov 2024 04:50 )   PT: 17.9 sec;   INR: 1.55 ratio         PTT - ( 03 Nov 2024 04:50 )  PTT:42.7 sec    < from: EGD w/ EUS (11.01.24 @ 00:00) >      Endoscopy Findings:        An upper endoscopy was performed, and the esophagus was normal.  There was old    blood noted in the stomach.  The gastric mucosa was fairly unremarkable.  On    retroflexion GE junction was unremarkable.  There was erythematous ulcerated    mucosa in the duodenal bulb.  The sweep could not be transversed.  After the EUS    with FNA was completed, next powder was sprayed for hemostasis.        Endoscopic Ultrasound        EUS Procedure:      Patient was placed in left lateral decubitus position. The Linear Echoendoscope    was introduced through mouth and advanced under direct visualization of stomach,    esophagus, duodenum and pancreas. Patient tolerance to the procedure was    excellent. The procedure was not difficult. Blood loss was minimal.        EUS Limitations/Complications:        There were no apparent limitations or complications        Endoscopic Ultrasound Findings:        The gastroscope was then exchanged for the echoendoscope and both the linear    echoendoscopes were used to scan the pancreatic parenchyma. The pancreatic body    and tail were initially examined from the gastric body and fundus revealing no    parenchymal abnormalities and a normal PD from the tail to the neck of pancreas.    There was a large mass in the duodenum. The pancreatic parenchyma in thehead    and uncinate was unremarkable. Pancreatic duct was not dilated.  There was a    hypoechoic mass in the duodenum which was about 4 x 4 cm.  2 FNA and 1 FNB pass    was obtained.  The mass could not be traversed.  The scope was then removed.      Impressions:      Duodenal mass.    Plan:      NPO. Clear liquid diet tomorrow. Surgical oncology follow up.? Radiation    oncology evaluation.    < end of copied text >

## 2024-11-04 NOTE — PROGRESS NOTE ADULT - SUBJECTIVE AND OBJECTIVE BOX
This is an 78 yo F who follows Dr Foster/JAEL for a history of VICTORIA S/P IV iron  last Hb in the office on 10/8 was 13.1 Has received IV iron in the past   presented to [facility] on 10/27 with few days of hematemesis and melena and was found to have anemia with Hgb of 7.4, s/p CT A/P at [facility] showing enlarged heterogenous pancreatic head, CBD dilation, duodenal thickening, gastric distention w/ liver hypodensities, all suspicious for neoplasm, being transferred to [facility] for advanced GI and surgical oncology eval    11/4/24 S/p EGD/ EUS, showed duodenal mass, s/p biopsy. Hemoglobin 6.6 gm this morning    MEDICATIONS  (STANDING):  carvedilol 6.25 milliGRAM(s) Oral every 12 hours  pantoprazole Infusion 8 mG/Hr (10 mL/Hr) IV Continuous <Continuous>  rosuvastatin 10 milliGRAM(s) Oral at bedtime  timolol 0.5% Solution 1 Drop(s) Both EYES at bedtime    MEDICATIONS  (PRN):  acetaminophen     Tablet .. 650 milliGRAM(s) Oral every 6 hours PRN Temp greater or equal to 38C (100.4F), Mild Pain (1 - 3)  melatonin 3 milliGRAM(s) Oral at bedtime PRN Insomnia  ondansetron Injectable 4 milliGRAM(s) IV Push every 6 hours PRN Nausea and/or Vomiting    Vital Signs Last 24 Hrs  T(C): 36.3 (04 Nov 2024 06:30), Max: 37.1 (04 Nov 2024 00:44)  T(F): 97.4 (04 Nov 2024 06:30), Max: 98.7 (04 Nov 2024 00:44)  HR: 65 (04 Nov 2024 06:30) (62 - 74)  BP: 112/62 (04 Nov 2024 06:30) (104/47 - 137/65)  BP(mean): --  RR: 16 (04 Nov 2024 06:30) (16 - 18)  SpO2: 98% (04 Nov 2024 06:30) (96% - 100%)    Parameters below as of 04 Nov 2024 06:30  Patient On (Oxygen Delivery Method): room air      GENERAL:  Well-appearing elderly male, not in acute distress  EYES:  Clear conjunctiva, extraocular movement intact  ENT: Moist mucous membranes  RESP:   lungs clear to ausculation   CV: Regular rate and rhythm  GI: Soft, non-tender, non-distended  NEURO: Awake, alert, conversant   PSYCH: Calm, cooperative  SKIN: No rash or lesions, warm and dry    CBC                          6.6    7.34  )-----------( 82       ( 04 Nov 2024 04:47 )             20.3                             8.0    9.39  )-----------( 104      ( 01 Nov 2024 05:00 )             24.6                           8.3    10.20 )-----------( 112      ( 31 Oct 2024 04:09 )             25.2                           9.3    9.10  )-----------( 117      ( 30 Oct 2024 02:00 )             27.5     CHEM    11-04    138  |  110[H]  |  25.4[H]  ----------------------------<  96  4.8   |  20.0[L]  |  0.83    Ca    8.3[L]      04 Nov 2024 04:47  Mg     1.8     11-04    TPro  4.5[L]  /  Alb  3.1[L]  /  TBili  0.3[L]  /  DBili  x   /  AST  17  /  ALT  10  /  AlkPhos  51  11-03      10-31    139  |  108  |  15.5  ----------------------------<  106[H]  4.4   |  19.0[L]  |  0.78    Ca    8.3[L]      31 Oct 2024 04:09  Mg     1.8     10-31    TPro  5.7[L]  /  Alb  3.7  /  TBili  0.5  /  DBili  x   /  AST  19  /  ALT  12  /  AlkPhos  70  10-30      10-30    138  |  104  |  22.9[H]  ----------------------------<  111[H]  4.4   |  22.0  |  1.13    Ca    8.7      30 Oct 2024 02:00  Mg     1.9     10-30    TPro  5.7[L]  /  Alb  3.7  /  TBili  0.5  /  DBili  x   /  AST  19  /  ALT  12  /  AlkPhos  70  10-30

## 2024-11-04 NOTE — PROGRESS NOTE ADULT - SUBJECTIVE AND OBJECTIVE BOX
WILLIAM BEHRLE  ----------------------------------------  The patient was seen earlier at bedside. Patient with anemia and duodenal mass. Noted episode of vomiting.    Vital Signs Last 24 Hrs  T(C): 36.6 (04 Nov 2024 09:40), Max: 37.1 (04 Nov 2024 00:44)  T(F): 97.9 (04 Nov 2024 09:40), Max: 98.7 (04 Nov 2024 00:44)  HR: 64 (04 Nov 2024 09:40) (62 - 74)  BP: 153/66 (04 Nov 2024 09:40) (104/47 - 153/66)  BP(mean): --  RR: 18 (04 Nov 2024 09:40) (16 - 18)  SpO2: 100% (04 Nov 2024 09:40) (96% - 100%)    Parameters below as of 04 Nov 2024 09:40  Patient On (Oxygen Delivery Method): room air    PHYSICAL EXAMINATION:  ----------------------------------------  General appearance: No acute distress, Awake, Alert  HEENT: Normocephalic, Atraumatic, Conjunctiva clear, EOMI  Neck: Supple, No JVD, No tenderness  Lungs: Breath sound equal bilaterally, No wheezes, No rales  Cardiovascular: S1S2, Regular rhythm  Abdomen: Soft, Nontender, Nondistended, No guarding/rebound, Positive bowel sounds  Extremities: No clubbing, No cyanosis, No edema, No calf tenderness  Neuro: Strength equal bilaterally, No tremors  Psychiatric: Appropriate mood, Normal affect    LABORATORY STUDIES:  ----------------------------------------             6.6    7.34  )-----------( 82       ( 04 Nov 2024 04:47 )             20.3     11-04    138  |  110[H]  |  25.4[H]  ----------------------------<  96  4.8   |  20.0[L]  |  0.83    Ca    8.3[L]      04 Nov 2024 04:47  Mg     1.8     11-04    TPro  4.5[L]  /  Alb  3.1[L]  /  TBili  0.3[L]  /  DBili  x   /  AST  17  /  ALT  10  /  AlkPhos  51  11-03    LIVER FUNCTIONS - ( 03 Nov 2024 04:50 )  Alb: 3.1 g/dL / Pro: 4.5 g/dL / ALK PHOS: 51 U/L / ALT: 10 U/L / AST: 17 U/L / GGT: x           PT/INR - ( 03 Nov 2024 04:50 )   PT: 17.9 sec;   INR: 1.55 ratio    PTT - ( 03 Nov 2024 04:50 )  PTT:42.7 sec    Urinalysis Basic - ( 04 Nov 2024 04:47 )  Color: x / Appearance: x / SG: x / pH: x  Gluc: 96 mg/dL / Ketone: x  / Bili: x / Urobili: x   Blood: x / Protein: x / Nitrite: x   Leuk Esterase: x / RBC: x / WBC x   Sq Epi: x / Non Sq Epi: x / Bacteria: x    MEDICATIONS  (STANDING):  amLODIPine   Tablet 2.5 milliGRAM(s) Oral daily  carvedilol 6.25 milliGRAM(s) Oral every 12 hours  dextrose 5% + sodium chloride 0.9%. 1000 milliLiter(s) (75 mL/Hr) IV Continuous <Continuous>  glycerin Suppository - Adult 1 Suppository(s) Rectal at bedtime  pantoprazole Infusion 8 mG/Hr (10 mL/Hr) IV Continuous <Continuous>  polyethylene glycol 3350 17 Gram(s) Oral daily  polyethylene glycol 3350 17 Gram(s) Oral daily  rosuvastatin 10 milliGRAM(s) Oral at bedtime  senna 2 Tablet(s) Oral at bedtime  tamsulosin 0.4 milliGRAM(s) Oral at bedtime  timolol 0.5% Solution 1 Drop(s) Both EYES at bedtime    MEDICATIONS  (PRN):  acetaminophen     Tablet .. 650 milliGRAM(s) Oral every 6 hours PRN Temp greater or equal to 38C (100.4F), Mild Pain (1 - 3)  melatonin 3 milliGRAM(s) Oral at bedtime PRN Insomnia  ondansetron Injectable 4 milliGRAM(s) IV Push every 6 hours PRN Nausea and/or Vomiting      ASSESSMENT / PLAN:  ----------------------------------------  79M with a history of coronary artery disease, gastroesophageal reflux, pancreatic mass, and glaucoma who presented to [facility] with hematemesis and melena found to have anemia requiring transfusion of packed red blood cells. CT of the abdomen was notable for an enlarged heterogenous pancreatic head, common bile duct dilation, duodenal thickening, gastric distention, and liver hypodensities. The patient was transfered to [facility] for evaluation and underwent endoscopy with findings of a duodenal mass. Packed red blood cells were transfused for recurrent anemia.    Acute blood loss anemia  - Endoscopy noted old blood, ulcerated mucosa in the duodenal bulb, and duodenal mass  - Monitoring hemoglobin levels  - On pantoprazole  - For repeat transfusion of packed red blood cells    Thrombocytopenia  - Monitoring platelet counts    Pancreatic / Duodenal mass  - Prior MRI (10/29) noted few small poorly delineated hepatic lesions, likely metastases, 4.5 x 3.5 cm infiltrative pancreatic head mass invading the proximal duodenum, interruption of the intrapancreatic portion of the common bile and main pancreatic ducts at the level of the mass with only minimal upstream ductal dilatation  - Biopsy results to be reviewed when available  - CA 19-9 level was elevated  - To avoid oral intake for now with episodes of vomiting  - Discussed with Gastroenterology and Surgery  - For CT angiogram to assess for any sites of bleeding    Coronary artery disease / Hypertension  - Aspirin held due to bleeding  - On amlodipine  - Lisinopril and spironolactone previously held due to acute kidney injury    Glaucoma  - On timolol eye drops          Dispo: Anticipate eventual discharge home pending decision on treatment for the pancreatic/duodenal mass

## 2024-11-04 NOTE — CHART NOTE - NSCHARTNOTEFT_GEN_A_CORE
Contacted by RN with critical value of Hgb at 6.6. Patient stable, vital signs consistent with past readings. Patient received 1 unit PRBC yesterday. Type and screen and 1 unit PRBC ordered. Repeat CBC ordered for 13:00.    RN to call with questions and concerns  Will endorse to day team to follow up

## 2024-11-04 NOTE — PROGRESS NOTE ADULT - ASSESSMENT
This is an 80 yo F who follows Dr Foster/JAEL for a history of VICTORIA S/P IV iron  last Hb in the office on 10/8 was 13.1 Has received IV iron in the past   presented to [facility] on 10/27 with few days of hematemesis and melena and was found to have anemia with Hgb of 7.4, s/p CT A/P at [facility] showing enlarged heterogenous pancreatic head, CBD dilation, duodenal thickening, gastric distention w/ liver hypodensities, all suspicious for neoplasm, being transferred to [facility] for advanced GI and surgical oncology eval    Acute blood loss anemia due to GI bleeding from duodenal ulcer  - follows Dr Foster/JAEL  - History of VICTORIA -S/P IV iron    - last Hb in the office on 10/8 was 13.1    - EGD on 10/28 at [facility] showed single ulcer at duodenal bulb  - s/p 2pRBC for Hgb of 7.4  - Hgb now 8.3  - Continue PPI drip  -  GI following- He has an obstruction on upper endoscopy that prohibited the passage of a 9 mm standard EGD scope.  - S/p EGD/ EUS, showed duodenal mass, s/p biopsy. Await path  -  Hemoglobin 6.6 gm this morning - to get PRBC    Pancreatic mass  - CT A/P at [facility] showing enlarged heterogenous pancreatic head, CBD dilation, duodenal thickening, gastric distention w/ liver hypodensities, all suspicious for neoplasm,  - Ca 19-9 markedly elevated >3K  - MRI abdomen completed at [facility] Technically limited study. Findings highly likely to represent metastatic pancreatic cancer, infiltrative primary pancreatic head neoplasm invading the duodenum and small presumed hepatic metastases.  - surg evaluated -No surgical intervention required at this time.   -  GI following    Will follow

## 2024-11-04 NOTE — PROGRESS NOTE ADULT - ASSESSMENT
79M with a history of coronary artery disease, gastroesophageal reflux, pancreatic mass, and glaucoma who presented to [facility] with hematemesis and melena.  CT of the abdomen was notable for an enlarged heterogenous pancreatic head, common bile duct dilation, duodenal thickening, gastric distention, and liver hypodensities.     Acute blood loss anemia  Duodenal mass    MRI (10/29) noted few small poorly delineated hepatic lesions, likely metastases, 4.5 x 3.5 cm infiltrative pancreatic head mass invading the proximal duodenum, interruption of the intrapancreatic portion of the common bile and main pancreatic ducts at the level of the mass with only minimal upstream ductal dilatation  S/p EGD/ EUS, showed duodenal mass, s/p biopsy. Hemoglobin 6.6 gm this morning  Continue Protonix infusion  Trend CBC, transfuse as needed  Avoid use of NSAIDs  CTA and/ pelv for further evaluation. Case d/w the patient, surgical oncology, and Intervention radiologist this morning. Further paln pending CTA report

## 2024-11-04 NOTE — PROGRESS NOTE ADULT - SUBJECTIVE AND OBJECTIVE BOX
Subjective: Patient seen and examined at bedside, no overnight events. Patient endorses diarrhea overnight and dark stool.       MEDICATIONS  (STANDING):  amLODIPine   Tablet 2.5 milliGRAM(s) Oral daily  carvedilol 6.25 milliGRAM(s) Oral every 12 hours  glycerin Suppository - Adult 1 Suppository(s) Rectal at bedtime  pantoprazole Infusion 8 mG/Hr (10 mL/Hr) IV Continuous <Continuous>  polyethylene glycol 3350 17 Gram(s) Oral daily  polyethylene glycol 3350 17 Gram(s) Oral daily  rosuvastatin 10 milliGRAM(s) Oral at bedtime  senna 2 Tablet(s) Oral at bedtime  tamsulosin 0.4 milliGRAM(s) Oral at bedtime  timolol 0.5% Solution 1 Drop(s) Both EYES at bedtime    MEDICATIONS  (PRN):  acetaminophen     Tablet .. 650 milliGRAM(s) Oral every 6 hours PRN Temp greater or equal to 38C (100.4F), Mild Pain (1 - 3)  melatonin 3 milliGRAM(s) Oral at bedtime PRN Insomnia  ondansetron Injectable 4 milliGRAM(s) IV Push every 6 hours PRN Nausea and/or Vomiting      Vital Signs Last 24 Hrs  T(C): 36.3 (04 Nov 2024 06:30), Max: 37.1 (04 Nov 2024 00:44)  T(F): 97.4 (04 Nov 2024 06:30), Max: 98.7 (04 Nov 2024 00:44)  HR: 65 (04 Nov 2024 06:30) (62 - 74)  BP: 112/62 (04 Nov 2024 06:30) (104/47 - 137/65)  BP(mean): --  RR: 16 (04 Nov 2024 06:30) (16 - 18)  SpO2: 98% (04 Nov 2024 06:30) (96% - 100%)    Parameters below as of 04 Nov 2024 06:30  Patient On (Oxygen Delivery Method): room air        Physical Exam:    Constitutional: NAD  HEENT: PERRL, EOMI  Respiratory: Respirations non-labored, no accessory muscle use  Gastrointestinal: Soft, non-tender, non-distended  Neurological: A&O x 3      LABS:                        6.6    7.34  )-----------( 82       ( 04 Nov 2024 04:47 )             20.3     11-04    138  |  110[H]  |  25.4[H]  ----------------------------<  96  4.8   |  20.0[L]  |  0.83    Ca    8.3[L]      04 Nov 2024 04:47  Mg     1.8     11-04    TPro  4.5[L]  /  Alb  3.1[L]  /  TBili  0.3[L]  /  DBili  x   /  AST  17  /  ALT  10  /  AlkPhos  51  11-03    PT/INR - ( 03 Nov 2024 04:50 )   PT: 17.9 sec;   INR: 1.55 ratio         PTT - ( 03 Nov 2024 04:50 )  PTT:42.7 sec  Urinalysis Basic - ( 04 Nov 2024 04:47 )    Color: x / Appearance: x / SG: x / pH: x  Gluc: 96 mg/dL / Ketone: x  / Bili: x / Urobili: x   Blood: x / Protein: x / Nitrite: x   Leuk Esterase: x / RBC: x / WBC x   Sq Epi: x / Non Sq Epi: x / Bacteria: x        A: 80 YO M with pancreatic ca with liver mets. Hgb 6.6 this AM, currently receiving 1uRBCs, likely from tumor bleeding.     PLAN:  - F/u CTA for tumor bleed  - Rec IR c/s for possible embolization  - Follow up hem/onc recs   - F/u GI for recs  - F/u path results

## 2024-11-05 ENCOUNTER — APPOINTMENT (OUTPATIENT)
Dept: UROLOGY | Facility: CLINIC | Age: 79
End: 2024-11-05

## 2024-11-05 ENCOUNTER — OUTPATIENT (OUTPATIENT)
Dept: OUTPATIENT SERVICES | Facility: HOSPITAL | Age: 79
LOS: 1 days | Discharge: HOME | End: 2024-11-05
Payer: MEDICARE

## 2024-11-05 DIAGNOSIS — K86.89 OTHER SPECIFIED DISEASES OF PANCREAS: ICD-10-CM

## 2024-11-05 DIAGNOSIS — Z98.890 OTHER SPECIFIED POSTPROCEDURAL STATES: Chronic | ICD-10-CM

## 2024-11-05 PROBLEM — K26.9 DUODENAL ULCER, UNSPECIFIED AS ACUTE OR CHRONIC, WITHOUT HEMORRHAGE OR PERFORATION: Chronic | Status: ACTIVE | Noted: 2024-10-30

## 2024-11-05 PROBLEM — I25.10 ATHEROSCLEROTIC HEART DISEASE OF NATIVE CORONARY ARTERY WITHOUT ANGINA PECTORIS: Chronic | Status: ACTIVE | Noted: 2024-10-30

## 2024-11-05 PROBLEM — H40.9 UNSPECIFIED GLAUCOMA: Chronic | Status: ACTIVE | Noted: 2024-10-30

## 2024-11-05 PROBLEM — I10 ESSENTIAL (PRIMARY) HYPERTENSION: Chronic | Status: ACTIVE | Noted: 2024-10-30

## 2024-11-05 PROBLEM — K21.9 GASTRO-ESOPHAGEAL REFLUX DISEASE WITHOUT ESOPHAGITIS: Chronic | Status: ACTIVE | Noted: 2024-10-30

## 2024-11-05 LAB
ANION GAP SERPL CALC-SCNC: 8 MMOL/L — SIGNIFICANT CHANGE UP (ref 5–17)
BUN SERPL-MCNC: 19.8 MG/DL — SIGNIFICANT CHANGE UP (ref 8–20)
CALCIUM SERPL-MCNC: 8 MG/DL — LOW (ref 8.4–10.5)
CHLORIDE SERPL-SCNC: 111 MMOL/L — HIGH (ref 96–108)
CO2 SERPL-SCNC: 21 MMOL/L — LOW (ref 22–29)
CREAT SERPL-MCNC: 0.86 MG/DL — SIGNIFICANT CHANGE UP (ref 0.5–1.3)
EGFR: 88 ML/MIN/1.73M2 — SIGNIFICANT CHANGE UP
GLUCOSE SERPL-MCNC: 108 MG/DL — HIGH (ref 70–99)
HCT VFR BLD CALC: 21.8 % — LOW (ref 39–50)
HCT VFR BLD CALC: 23.6 % — LOW (ref 39–50)
HGB BLD-MCNC: 7.1 G/DL — LOW (ref 13–17)
HGB BLD-MCNC: 7.7 G/DL — LOW (ref 13–17)
MCHC RBC-ENTMCNC: 30.5 PG — SIGNIFICANT CHANGE UP (ref 27–34)
MCHC RBC-ENTMCNC: 30.6 PG — SIGNIFICANT CHANGE UP (ref 27–34)
MCHC RBC-ENTMCNC: 32.6 G/DL — SIGNIFICANT CHANGE UP (ref 32–36)
MCHC RBC-ENTMCNC: 32.6 G/DL — SIGNIFICANT CHANGE UP (ref 32–36)
MCV RBC AUTO: 93.6 FL — SIGNIFICANT CHANGE UP (ref 80–100)
MCV RBC AUTO: 93.7 FL — SIGNIFICANT CHANGE UP (ref 80–100)
NON-GYNECOLOGICAL CYTOLOGY STUDY: SIGNIFICANT CHANGE UP
PLATELET # BLD AUTO: 78 K/UL — LOW (ref 150–400)
PLATELET # BLD AUTO: 78 K/UL — LOW (ref 150–400)
POTASSIUM SERPL-MCNC: 4 MMOL/L — SIGNIFICANT CHANGE UP (ref 3.5–5.3)
POTASSIUM SERPL-SCNC: 4 MMOL/L — SIGNIFICANT CHANGE UP (ref 3.5–5.3)
RBC # BLD: 2.33 M/UL — LOW (ref 4.2–5.8)
RBC # BLD: 2.52 M/UL — LOW (ref 4.2–5.8)
RBC # FLD: 15.4 % — HIGH (ref 10.3–14.5)
RBC # FLD: 15.5 % — HIGH (ref 10.3–14.5)
SODIUM SERPL-SCNC: 140 MMOL/L — SIGNIFICANT CHANGE UP (ref 135–145)
WBC # BLD: 7.93 K/UL — SIGNIFICANT CHANGE UP (ref 3.8–10.5)
WBC # BLD: 9.4 K/UL — SIGNIFICANT CHANGE UP (ref 3.8–10.5)
WBC # FLD AUTO: 7.93 K/UL — SIGNIFICANT CHANGE UP (ref 3.8–10.5)
WBC # FLD AUTO: 9.4 K/UL — SIGNIFICANT CHANGE UP (ref 3.8–10.5)

## 2024-11-05 PROCEDURE — 99232 SBSQ HOSP IP/OBS MODERATE 35: CPT

## 2024-11-05 PROCEDURE — 99223 1ST HOSP IP/OBS HIGH 75: CPT

## 2024-11-05 PROCEDURE — 99233 SBSQ HOSP IP/OBS HIGH 50: CPT

## 2024-11-05 RX ADMIN — PANTOPRAZOLE SODIUM 10 MG/HR: 40 TABLET, DELAYED RELEASE ORAL at 11:50

## 2024-11-05 RX ADMIN — Medication 1 DROP(S): at 21:30

## 2024-11-05 RX ADMIN — CARVEDILOL 6.25 MILLIGRAM(S): 25 TABLET, FILM COATED ORAL at 05:45

## 2024-11-05 RX ADMIN — PANTOPRAZOLE SODIUM 10 MG/HR: 40 TABLET, DELAYED RELEASE ORAL at 21:31

## 2024-11-05 RX ADMIN — Medication 0.4 MILLIGRAM(S): at 21:29

## 2024-11-05 RX ADMIN — CARVEDILOL 6.25 MILLIGRAM(S): 25 TABLET, FILM COATED ORAL at 18:18

## 2024-11-05 RX ADMIN — Medication 75 MILLILITER(S): at 11:50

## 2024-11-05 RX ADMIN — Medication 2.5 MILLIGRAM(S): at 05:45

## 2024-11-05 RX ADMIN — Medication 10 MILLIGRAM(S): at 21:29

## 2024-11-05 RX ADMIN — PANTOPRAZOLE SODIUM 10 MG/HR: 40 TABLET, DELAYED RELEASE ORAL at 02:33

## 2024-11-05 RX ADMIN — Medication 75 MILLILITER(S): at 21:31

## 2024-11-05 NOTE — PROGRESS NOTE ADULT - SUBJECTIVE AND OBJECTIVE BOX
Subjective: Patient seen and examined at bedside, no current complaints, no overnight events, denies SOB/CP/N/V.       MEDICATIONS  (STANDING):  amLODIPine   Tablet 2.5 milliGRAM(s) Oral daily  carvedilol 6.25 milliGRAM(s) Oral every 12 hours  dextrose 5% + sodium chloride 0.9%. 1000 milliLiter(s) (75 mL/Hr) IV Continuous <Continuous>  glycerin Suppository - Adult 1 Suppository(s) Rectal at bedtime  pantoprazole Infusion 8 mG/Hr (10 mL/Hr) IV Continuous <Continuous>  polyethylene glycol 3350 17 Gram(s) Oral daily  polyethylene glycol 3350 17 Gram(s) Oral daily  rosuvastatin 10 milliGRAM(s) Oral at bedtime  senna 2 Tablet(s) Oral at bedtime  tamsulosin 0.4 milliGRAM(s) Oral at bedtime  timolol 0.5% Solution 1 Drop(s) Both EYES at bedtime    MEDICATIONS  (PRN):  acetaminophen     Tablet .. 650 milliGRAM(s) Oral every 6 hours PRN Temp greater or equal to 38C (100.4F), Mild Pain (1 - 3)  melatonin 3 milliGRAM(s) Oral at bedtime PRN Insomnia  ondansetron Injectable 4 milliGRAM(s) IV Push every 6 hours PRN Nausea and/or Vomiting      Vital Signs Last 24 Hrs  T(C): 37.2 (05 Nov 2024 04:15), Max: 37.2 (05 Nov 2024 04:15)  T(F): 98.9 (05 Nov 2024 04:15), Max: 98.9 (05 Nov 2024 04:15)  HR: 74 (05 Nov 2024 04:15) (64 - 77)  BP: 149/63 (05 Nov 2024 04:15) (145/79 - 153/66)  BP(mean): --  RR: 18 (05 Nov 2024 04:15) (17 - 18)  SpO2: 96% (05 Nov 2024 04:15) (95% - 100%)    Parameters below as of 05 Nov 2024 04:15  Patient On (Oxygen Delivery Method): room air        Physical Exam:    Constitutional: NAD  HEENT: PERRL, EOMI  Respiratory: Respirations non-labored, no accessory muscle use  Gastrointestinal: Softly distended, non-tender  Neurological: A&O x 3      LABS:                        9.7    13.76 )-----------( 92       ( 04 Nov 2024 13:14 )             30.5     11-04    138  |  110[H]  |  25.4[H]  ----------------------------<  96  4.8   |  20.0[L]  |  0.83    Ca    8.3[L]      04 Nov 2024 04:47  Mg     1.8     11-04        Urinalysis Basic - ( 04 Nov 2024 04:47 )    Color: x / Appearance: x / SG: x / pH: x  Gluc: 96 mg/dL / Ketone: x  / Bili: x / Urobili: x   Blood: x / Protein: x / Nitrite: x   Leuk Esterase: x / RBC: x / WBC x   Sq Epi: x / Non Sq Epi: x / Bacteria: x        A: 78 yo M with 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

## 2024-11-05 NOTE — PROGRESS NOTE ADULT - SUBJECTIVE AND OBJECTIVE BOX
WILLIAM BEHRLE  ----------------------------------------  The patient was seen earlier at bedside. Patient with duodenal/pancreatic mass and anemia. Offered no complaints. Ambulating well.    Vital Signs Last 24 Hrs  T(C): 36.4 (05 Nov 2024 08:05), Max: 37.2 (05 Nov 2024 04:15)  T(F): 97.5 (05 Nov 2024 08:05), Max: 98.9 (05 Nov 2024 04:15)  HR: 83 (05 Nov 2024 08:05) (71 - 83)  BP: 108/60 (05 Nov 2024 08:05) (108/60 - 149/63)  BP(mean): --  RR: 18 (05 Nov 2024 08:05) (17 - 18)  SpO2: 94% (05 Nov 2024 08:05) (94% - 96%)    Parameters below as of 05 Nov 2024 08:05  Patient On (Oxygen Delivery Method): room air    PHYSICAL EXAMINATION:  ----------------------------------------  General appearance: No acute distress, Awake, Alert  HEENT: Normocephalic, Atraumatic, Conjunctiva clear, EOMI  Neck: Supple, No JVD, No tenderness  Lungs: Breath sound equal bilaterally, No wheezes, No rales  Cardiovascular: S1S2, Regular rhythm  Abdomen: Soft, Nontender, Nondistended, No guarding/rebound, Positive bowel sounds  Extremities: No clubbing, No cyanosis, No edema, No calf tenderness  Neuro: Strength equal bilaterally, No tremors  Psychiatric: Appropriate mood, Normal affect    LABORATORY STUDIES:  ----------------------------------------             7.1    7.93  )-----------( 78       ( 05 Nov 2024 08:15 )             21.8     11-05    140  |  111[H]  |  19.8  ----------------------------<  108[H]  4.0   |  21.0[L]  |  0.86    Ca    8.0[L]      05 Nov 2024 08:15  Mg     1.8     11-04    Urinalysis Basic - ( 05 Nov 2024 08:15 )  Color: x / Appearance: x / SG: x / pH: x  Gluc: 108 mg/dL / Ketone: x  / Bili: x / Urobili: x   Blood: x / Protein: x / Nitrite: x   Leuk Esterase: x / RBC: x / WBC x   Sq Epi: x / Non Sq Epi: x / Bacteria: x    MEDICATIONS  (STANDING):  amLODIPine   Tablet 2.5 milliGRAM(s) Oral daily  carvedilol 6.25 milliGRAM(s) Oral every 12 hours  dextrose 5% + sodium chloride 0.9%. 1000 milliLiter(s) (75 mL/Hr) IV Continuous <Continuous>  glycerin Suppository - Adult 1 Suppository(s) Rectal at bedtime  pantoprazole Infusion 8 mG/Hr (10 mL/Hr) IV Continuous <Continuous>  polyethylene glycol 3350 17 Gram(s) Oral daily  polyethylene glycol 3350 17 Gram(s) Oral daily  rosuvastatin 10 milliGRAM(s) Oral at bedtime  senna 2 Tablet(s) Oral at bedtime  tamsulosin 0.4 milliGRAM(s) Oral at bedtime  timolol 0.5% Solution 1 Drop(s) Both EYES at bedtime    MEDICATIONS  (PRN):  acetaminophen     Tablet .. 650 milliGRAM(s) Oral every 6 hours PRN Temp greater or equal to 38C (100.4F), Mild Pain (1 - 3)  melatonin 3 milliGRAM(s) Oral at bedtime PRN Insomnia  ondansetron Injectable 4 milliGRAM(s) IV Push every 6 hours PRN Nausea and/or Vomiting      ASSESSMENT / PLAN:  ----------------------------------------  79M with a history of coronary artery disease, gastroesophageal reflux, pancreatic mass, and glaucoma who presented to [facility] with hematemesis and melena found to have anemia requiring transfusion of packed red blood cells. CT of the abdomen was notable for an enlarged heterogenous pancreatic head, common bile duct dilation, duodenal thickening, gastric distention, and liver hypodensities. The patient was transfered to [facility] for evaluation and underwent endoscopy with findings of a duodenal mass. Packed red blood cells were transfused for recurrent anemia.    Acute blood loss anemia  - Endoscopy(11/1) noted old blood, ulcerated mucosa in the duodenal bulb, and duodenal mass  - Monitoring hemoglobin levels  - For repeat transfusion of packed red blood cells  - To continue on pantoprazole  - To avoid oral intake for now, on intravenous fluids  - May require Radiation Therapy for tumor bleeding    Thrombocytopenia  - Monitoring platelet counts    Pancreatic / Duodenal mass  - Prior MRI (10/29) noted few small poorly delineated hepatic lesions, likely metastases, 4.5 x 3.5 cm infiltrative pancreatic head mass invading the proximal duodenum, interruption of the intrapancreatic portion of the common bile and main pancreatic ducts at the level of the mass with only minimal upstream ductal dilatation  - Biopsy results to be reviewed when available  - CA 19-9 level was elevated  - CT angiogram results pending to assess for possible bleeding site    Coronary artery disease / Hypertension  - Aspirin held due to bleeding and anemia  - On amlodipine  - Lisinopril and spironolactone previously held due to acute kidney injury    Glaucoma  - On timolol eye drops        Dispo: Anticipate eventual discharge home pending improvement in anemia and tolerance of oral intake

## 2024-11-05 NOTE — PROGRESS NOTE ADULT - ASSESSMENT
79M with a history of coronary artery disease, gastroesophageal reflux, pancreatic mass, and glaucoma who presented to [facility] with hematemesis and melena.  CT of the abdomen notable for an enlarged heterogenous pancreatic head, common bile duct dilation, duodenal thickening, gastric distention, and liver hypodensities.     Acute blood loss anemia  Duodenal mass    MRI (10/29) noted few small poorly delineated hepatic lesions, likely metastases, 4.5 x 3.5 cm infiltrative pancreatic head mass invading the proximal duodenum, interruption of the intrapancreatic portion of the common bile and main pancreatic ducts at the level of the mass with only minimal upstream ductal dilatation  S/p EGD/ EUS, showed duodenal mass, s/p biopsy. Hemoglobin 7.7 gm this morning. No melena, hematemesis, hematochezia  CTA abdomen completed to r/o any source of bleeding which can be embolized, official report pending.   Hematology/ oncology following, may need radiation prior to possible duodenal stent placement    Continue Protonix   Trend CBC, transfuse as needed  Avoid use of NSAIDs  Case d/w the patient, surgical oncology, and Intervention radiologist.

## 2024-11-05 NOTE — CONSULT NOTE ADULT - PROBLEM SELECTOR RECOMMENDATION 9
- Biopsy confirmed adenocarcinoma.  Causing GI bleeding, requiring multiple transfusions.  Has received 5U PRBC thus far.  - Logistics, rationale, risks, benefits, and alternatives of palliative radiation discussed.  - Patient voiced understanding, and wishes to proceed.  - Goal of decreased bleeding and tumor shrinkage, to enable duodenal stent placement and initiation of systemic therapy.  - Will discuss with hospital administration regarding transport to [facility] for radiation planning/treatment.  Plan for treatment planning tomorrow (11/6), and initiation of RT on 11/7.   - Discussed with patient the different fractionation schemes for palliation; he expressed preference for shorter course.  - Please contact with any questions, [phone].

## 2024-11-05 NOTE — CONSULT NOTE ADULT - ASSESSMENT
79-year-old male with history of coronary artery disease status post CABG and hypertension here with acute blood loss anemia in the setting of duodenal ulcer along with head of pancreas mass with ductal dilatation    Head of pancreas mass  Duodenal Obstruction/Duodenal Ulcer  This is likely pancreatic cancer primary malignancy.   He has an obstruction on upper endoscopy that prohibited the passage of a 9 mm standard EGD scope.  Will await pathology results, if this is diagnostic of adenocarcinoma, EUS is not required, in the setting of duodenal obstruction, the HOP may not be accessible from the stomach  Please check CA 19-9 and consult medical oncology and surgical oncology.    Duodenal ulcer  Monitor and maintain hemoglobin greater than 8  Avoid NSAIDs  Clear liquid diet  Continue PPI continuous infusion
  ASSESSMENT: 79-year-old male with history of coronary artery disease status post CABG and hypertension here with acute blood loss anemia in the setting of duodenal ulcer along with head of pancreas mass with ductal dilatation with stomach dilation. Ca 19-9 markedly elevated,    CT A/P at showing enlarged heterogenous pancreatic head, CBD dilation, duodenal thickening, gastric distention w/ liver hypodensities, all suspicious for neoplasm,      PLAN:      - f/u pathology results  - f/u MRI results   - no role for surgical resection at this time  - f/u Hem/onc recs - would recommend chemotherapy   - f/u palliative and GOC would recommend palliative stent by GI   - Plan discussed with Attending, Dr. Perez     
79 year old gentleman with newly diagnosed pancreatic head mass/duodenal extension, causing GI bleeding.  Biopsy showed adenocarcinoma.

## 2024-11-05 NOTE — CONSULT NOTE ADULT - REASON FOR ADMISSION
Acute blood loss anemia due to GI bleeding from duodenal ulcer, pancreatic mass

## 2024-11-05 NOTE — CONSULT NOTE ADULT - SUBJECTIVE AND OBJECTIVE BOX
HISTORY OF PRESENT ILLNESS: 79-year-old male with a history of coronary artery disease status post CABG and hypertension who was transferred from [facility] for evaluation of pancreas mass and ductal dilatation.  He presented to [facility] after several days of coffee-ground emesis.  He was noted to be anemic with a hemoglobin of 7.4 and underwent an endoscopy that demonstrated a duodenal bulb ulcer. He was also found to have duodenal stenosis that prohibited passage of the standard EGD scope. On imaging, he was noted to have a pancreas mass with liver hypodensities along with ductal dilatation of the common bile duct. Therefore, he was transferred for further evaluation to [facility].  He states over the last several weeks he was noted to be anemic requiring IV iron transfusions with initial appropriate response but followed by progressive anemia.  He reports fatigue.  He denies nausea.  He reports emesis only in the last few days.  He denies a history of tobacco use or family history of pancreas disorders.  He denies jaundice or pruritus.            PAST MEDICAL/SURGICAL HISTORY:  CAD (coronary artery disease)    Hypertension    Glaucoma    Pancreatic mass    Duodenal ulcer    GERD (gastroesophageal reflux disease)    S/P inguinal hernia repair    H/O aortic valve replacement      SOCIAL HISTORY:  - TOBACCO: Denies  - ALCOHOL: Denies  - ILLICIT DRUG USE: Denies    FAMILY HISTORY:  No known history of gastrointestinal or liver disease;  FHx: coronary artery disease        HOME MEDICATIONS:  carvedilol 6.25 mg oral tablet: 1 tab(s) orally 2 times a day (30 Oct 2024 01:12)  dicyclomine 10 mg oral capsule: 1 cap(s) orally 2 times a day (30 Oct 2024 01:18)  pantoprazole 0.8 mg/mL-NaCl 0.9% intravenous solution: intravenous every minute (30 Oct 2024 01:12)  rosuvastatin 10 mg oral tablet: 1 tab(s) orally once a day (at bedtime) (30 Oct 2024 01:12)  timolol hemihydrate 0.5% ophthalmic solution: 1 drop(s) in each affected eye once a day (at bedtime) (30 Oct 2024 01:12)    INPATIENT MEDICATIONS:  MEDICATIONS  (STANDING):  carvedilol 6.25 milliGRAM(s) Oral every 12 hours  pantoprazole Infusion 8 mG/Hr (10 mL/Hr) IV Continuous <Continuous>  rosuvastatin 10 milliGRAM(s) Oral at bedtime  timolol 0.5% Solution 1 Drop(s) Both EYES at bedtime    MEDICATIONS  (PRN):  acetaminophen     Tablet .. 650 milliGRAM(s) Oral every 6 hours PRN Temp greater or equal to 38C (100.4F), Mild Pain (1 - 3)  melatonin 3 milliGRAM(s) Oral at bedtime PRN Insomnia  ondansetron Injectable 4 milliGRAM(s) IV Push every 6 hours PRN Nausea and/or Vomiting    ALLERGIES:  No Known Allergies    T(C): 36.6 (10-30-24 @ 08:35), Max: 36.6 (10-30-24 @ 05:05)  HR: 76 (10-30-24 @ 08:35) (76 - 79)  BP: 137/71 (10-30-24 @ 08:35) (114/63 - 137/71)  RR: 18 (10-30-24 @ 08:35) (18 - 18)  SpO2: 97% (10-30-24 @ 08:35) (97% - 99%)      PHYSICAL EXAM:  Constitutional: , in no apparent distress  Eyes: Sclerae anicteric, conjunctivae normal  ENMT: Mucus membranes moist, no oropharyngeal thrush noted  Respiratory: Breathing nonlabored; clear to auscultation  Cardiovascular: Regular rate and rhythm  Gastrointestinal: Soft, nontender, nondistended, normoactive bowel sounds;  no rebound tenderness or involuntary guarding  Extremities: No  edema  Neurological: Alert and oriented to person, place and time;   Skin: No jaundice  Musculoskeletal: No significant peripheral atrophy  Psychiatric: Affect and mood appropriate      LABS:             9.3    9.10  )-----------( 117      ( 10-30 @ 02:00 )             27.5       PT/INR - ( 30 Oct 2024 02:00 )   PT: 14.4 sec;   INR: 1.28 ratio         PTT - ( 30 Oct 2024 02:00 )  PTT:46.9 sec  10-30    138  |  104  |  22.9[H]  ----------------------------<  111[H]  4.4   |  22.0  |  1.13    Ca    8.7      30 Oct 2024 02:00  Mg     1.9     10-30    TPro  5.7[L]  /  Alb  3.7  /  TBili  0.5  /  DBili  x   /  AST  19  /  ALT  12  /  AlkPhos  70  10-30    LIVER FUNCTIONS - ( 30 Oct 2024 02:00 )  Alb: 3.7 g/dL / Pro: 5.7 g/dL / ALK PHOS: 70 U/L / ALT: 12 U/L / AST: 19 U/L / GGT: x                 Urinalysis Basic - ( 30 Oct 2024 02:00 )    Color: x / Appearance: x / SG: x / pH: x  Gluc: 111 mg/dL / Ketone: x  / Bili: x / Urobili: x   Blood: x / Protein: x / Nitrite: x   Leuk Esterase: x / RBC: x / WBC x   Sq Epi: x / Non Sq Epi: x / Bacteria: x    Findings:  Esophagus Additional esophagus findings - Small ulceration seen at the Z-line at  35 cm from the incisors, likely representing previous biopsy site (from EGD  9/19/2024). No active bleeding seen here. Rest of esophagus appeared normal..  Stomach Mucosa Normal mucosa was noted in the whole stomach. No ulcers or masses  seen in the stomach. No stigmata of active or recent bleeding seen in the  stomach.  Duodenum Excavated lesions A single ulcer was found in the duodenal bulb. The  proximal duodenal bulb appeared normal. On the posterior wall of the duodenal  bulb at the sweep, there was ulceration and friable mucosa. A cold forceps  biopsy specimen was obtained from the nodular mucosa. A stenotic area was seen  at the duodenal sweep. The upper endoscope was unable to traverse this area.  Oozing of blood was noted during the procedure but appeared to have stopped at  the conclusion of the procedure.  Impressions:  - Small ulceration seen at the Z-line at 35 cm from the incisors, likely  representing previous biopsy site (from EGD 9/19/2024). No active bleeding seen  here. Rest of esophagus appeared normal. .  Normal mucosa in the whole stomach.  Ulcer in the duodenal bulb.  Plan:  Return to floor for further management [] Clear liquid diet [] PPI gtt []  Hgb/Hct q6 hrs, transfusing to keep Hgb > 8.0 [] Surgical consultation []  Discussed with Advanced GI Attending Dr. Galindo Robles from [facility].    IMAGING: I personally reviewed the CTAP, and I agree with the radiologist's interpretation as described below: hop mass  
HPI: surgical oncology consulted for pancreatic mass     "79-year-old male with a history of coronary artery disease status post CABG and hypertension who was transferred from [facility] for evaluation of pancreas mass and ductal dilatation.  He presented to [facility] after several days of coffee-ground emesis.  He was noted to be anemic with a hemoglobin of 7.4 and underwent an endoscopy that demonstrated a duodenal bulb ulcer. He was also found to have duodenal stenosis that prohibited passage of the standard EGD scope. On imaging, he was noted to have a pancreas mass with liver hypodensities along with ductal dilatation of the common bile duct. Therefore, he was transferred for further evaluation to [facility] [place].  He states over the last several weeks he was noted to be anemic requiring IV iron transfusions with initial appropriate response but followed by progressive anemia.  He reports fatigue.  He denies nausea.  He reports emesis only in the last few days.  He denies a history of tobacco use or family history of pancreas disorders.  He denies jaundice or pruritus."      HPI: " 79yoM hx CAD, HTN, glaucoma who [place] [facility] [place] with few days of hematemesis and melena and was found to have anemia with Hgb of 7.4. CT [place] [facility] [place] enlarged heterogenous pancreatic head, CBD dilation, duodenal thickening, gastric distention w/ liver hypodensities, all suspicious for neoplasm.  Pt received 2pRBC was started on PPI drip and ASA was held.  He was also found to have CJ, hyponatremia and his spironolactone and lisinopril was held.  Pt seen by GI, underwent EGD on 10/28 that showed duodenal bulb ulcer.  Decision was made to transfer pt to [facility] for advanced GI evaluation as pt thought to benefit from possible duodenal stent and also for surgical oncology evaluation. "     ROS: 10-system review is otherwise negative except HPI above.      PAST MEDICAL & SURGICAL HISTORY:  CAD (coronary artery disease)      Hypertension      Glaucoma      Pancreatic mass      Duodenal ulcer      GERD (gastroesophageal reflux disease)      S/P inguinal hernia repair      H/O aortic valve replacement        FAMILY HISTORY:  FHx: coronary artery disease      Family history not pertinent as reviewed with the patient.    SOCIAL HISTORY:  Denies any toxic habits    ALLERGIES: NKA No Known Allergies  Lipitor (Other)      Home Medications:  carvedilol 6.25 mg oral tablet: 1 tab(s) orally 2 times a day (30 Oct 2024 01:12)  dicyclomine 10 mg oral capsule: 1 cap(s) orally 2 times a day (30 Oct 2024 01:18)  pantoprazole 0.8 mg/mL-NaCl 0.9% intravenous solution: intravenous every minute (30 Oct 2024 01:12)  rosuvastatin 10 mg oral tablet: 1 tab(s) orally once a day (at bedtime) (30 Oct 2024 01:12)  timolol hemihydrate 0.5% ophthalmic solution: 1 drop(s) in each affected eye once a day (at bedtime) (30 Oct 2024 01:12)      --------------------------------------------------------------------------------------------    PHYSICAL EXAM:   General: NAD, Lying in bed comfortably  Neuro: A+Ox3  HEENT: EOMI, PERRLA, MMM  Cardio: RRR  Resp: Non labored breathing on RA  GI/Abd: Soft, NT/ND, no rebound/guarding, no masses palpated  Vascular: All 4 extremities warm and well perfused.   Pelvis: stable  Musculoskeletal: All 4 extremities moving spontaneously, no limitations, no spinal tenderness.  --------------------------------------------------------------------------------------------    LABS                 9.3    9.10   )----------(  117       ( 30 Oct 2024 02:00 )               27.5      138    |  104    |  22.9   ----------------------------<  111        ( 30 Oct 2024 02:00 )  4.4     |  22.0   |  1.13     Ca    8.7        ( 30 Oct 2024 02:00 )  Mg     1.9       ( 30 Oct 2024 02:00 )    TPro  5.7    /  Alb  3.7    /  TBili  0.5    /  DBili  x      /  AST  19     /  ALT  12     /  AlkPhos  70     ( 30 Oct 2024 02:00 )    LIVER FUNCTIONS - ( 30 Oct 2024 02:00 )  Alb: 3.7 g/dL / Pro: 5.7 g/dL / ALK PHOS: 70 U/L / ALT: 12 U/L / AST: 19 U/L / GGT: x           PT/INR -  14.4 sec / 1.28 ratio   ( 30 Oct 2024 02:00 )       PTT -  46.9 sec   ( 30 Oct 2024 02:00 )  CAPILLARY BLOOD GLUCOSE        Urinalysis Basic - ( 30 Oct 2024 02:00 )    Color: x / Appearance: x / SG: x / pH: x  Gluc: 111 mg/dL / Ketone: x  / Bili: x / Urobili: x   Blood: x / Protein: x / Nitrite: x   Leuk Esterase: x / RBC: x / WBC x   Sq Epi: x / Non Sq Epi: x / Bacteria: x        
Patient is a 79y old  Male who presents with a chief complaint of Acute blood loss anemia due to GI bleeding from duodenal ulcer, pancreatic mass (05 Nov 2024 11:17)      HPI:  79yoM hx CAD, HTN, glaucoma who presented to [facility] on [place]27 with few days of hematemesis and melena and was found to have anemia with Hgb of 7.4. CT [place]/P at [facility] showed enlarged heterogenous pancreatic head, CBD dilation, duodenal thickening, gastric distention w/ liver hypodensities, all suspicious for neoplasm.  Pt received 2pRBC was started on PPI drip and ASA was held.  He was also found to have CJ, hyponatremia and his spironolactone and lisinopril was held.  Pt seen by GI, underwent EGD on 10/28 that showed duodenal bulb ulcer.  Decision was made to transfer pt to [facility] for advanced GI evaluation as pt thought to benefit from possible duodenal stent and also for surgical oncology evaluation. (30 Oct 2024 00:51).    11/1/24 DUODENUM, MASS, EUS GUIDED FNA  POSITIVE FOR MALIGNANT CELLS; Adenocarcinoma    CT abd/pelvis performed 11/4/24.      PAST MEDICAL & SURGICAL HISTORY:  CAD (coronary artery disease)  Hypertension  Glaucoma  Pancreatic mass  Duodenal ulcer  GERD (gastroesophageal reflux disease)  S/P inguinal hernia repair  H/O aortic valve replacement      FAMILY HISTORY:  FHx: coronary artery disease      Allergies  Gluten (Stomach Upset; Diarrhea)  No Known Drug Allergies    Intolerances  Lipitor (Other)      MEDICATIONS  (STANDING):  amLODIPine   Tablet 2.5 milliGRAM(s) Oral daily  carvedilol 6.25 milliGRAM(s) Oral every 12 hours  dextrose 5% + sodium chloride 0.9%. 1000 milliLiter(s) (75 mL/Hr) IV Continuous <Continuous>  glycerin Suppository - Adult 1 Suppository(s) Rectal at bedtime  pantoprazole Infusion 8 mG/Hr (10 mL/Hr) IV Continuous <Continuous>  polyethylene glycol 3350 17 Gram(s) Oral daily  polyethylene glycol 3350 17 Gram(s) Oral daily  rosuvastatin 10 milliGRAM(s) Oral at bedtime  senna 2 Tablet(s) Oral at bedtime  tamsulosin 0.4 milliGRAM(s) Oral at bedtime  timolol 0.5% Solution 1 Drop(s) Both EYES at bedtime    ROS:  Negative except for HPI    PHYSICAL EXAM:  Vital Signs Last 24 Hrs  T(C): 36.6 (05 Nov 2024 15:05), Max: 37.2 (05 Nov 2024 04:15)  T(F): 97.8 (05 Nov 2024 15:05), Max: 98.9 (05 Nov 2024 04:15)  HR: 89 (05 Nov 2024 15:05) (74 - 89)  BP: 133/54 (05 Nov 2024 15:05) (106/61 - 149/63)  BP(mean): --  RR: 18 (05 Nov 2024 15:05) (17 - 18)  SpO2: 96% (05 Nov 2024 15:05) (94% - 96%)    Parameters below as of 05 Nov 2024 15:05  Patient On (Oxygen Delivery Method): room air      IMAGING/LABS/PATHOLOGY: I have personally reviewed the relevant labs, pathology, and imaging as noted in the HPI.  In addition,                        7.7    9.40  )-----------( 78       ( 05 Nov 2024 10:28 )             23.6     11-05    140  |  111[H]  |  19.8  ----------------------------<  108[H]  4.0   |  21.0[L]  |  0.86    Ca    8.0[L]      05 Nov 2024 08:15  Mg     1.8     11-04

## 2024-11-05 NOTE — PROGRESS NOTE ADULT - ASSESSMENT
This is an 80 yo F who follows Dr Foster/JAEL for a history of VICTORIA S/P IV iron  last Hb in the office on 10/8 was 13.1 Has received IV iron in the past   presented to [facility] on 10/27 with few days of hematemesis and melena and was found to have anemia with Hgb of 7.4, s/p CT A/P at [facility] showing enlarged heterogenous pancreatic head, CBD dilation, duodenal thickening, gastric distention w/ liver hypodensities, all suspicious for neoplasm, being transferred to [facility] for advanced GI and surgical oncology eval    Acute blood loss anemia due to GI bleeding from duodenal ulcer  - follows Dr Foster/JAEL  - History of VICTORIA -S/P IV iron    - last Hb in the office on 10/8 was 13.1    - EGD on 10/28 at [facility] showed single ulcer at duodenal bulb  - s/p 1 UPRBC for hgb 6.6  - Hgb 9.2 after transfusion Dropped to 7.1 this AM  - Continue PPI drip  -  GI following- He has an obstruction on upper endoscopy that prohibited the passage of a 9 mm standard EGD scope.  - IR for embolization   -Surgery following- possible endoscopic stenting for palliation of duodenal obstruction  - ideally, once bleeding stabilizes and duodenal obstruction addressed with stent, would plan for systemic therapy as outpatient for metastatic duodenal/pancreatic cancer with liver metastases      Pancreatic mass  - CT A/P at [facility] showing enlarged heterogenous pancreatic head, CBD dilation, duodenal thickening, gastric distention w/ liver hypodensities, all suspicious for neoplasm,  - Ca 19-9 markedly elevated [id number]  - MRI abdomen completed at [facility] Technically limited study. Findings highly likely to represent metastatic pancreatic cancer, infiltrative primary pancreatic head neoplasm invading the duodenum and small presumed hepatic metastases.  -  GI following  - S/p EGD/ EUS, showed duodenal mass, s/p biopsy. Await path  - For CT angiogram to assess for any sites of bleeding    Will follow

## 2024-11-05 NOTE — PROGRESS NOTE ADULT - SUBJECTIVE AND OBJECTIVE BOX
Chief Complaint: This is a 79y old man patient being seen in follow-up consultation for acute blood loss anemia due to GI bleeding from duodenal ulcer, pancreatic mass.    Interval HPI / 24H events:  Patient seen and evaluated at bedside, reporting no complaints. Patient denies fever, chills, abdominal pain, nausea, vomiting, chest pain, palpitation, hematemesis, hematochezia, melena. Hemoglobin 7.7 gm this morning. He has received 1 units of PRBC for hgb 6.6 yesterday. (6.6-->9.7-->7.1-->7.7)    Review of Systems:  . Constitutional: No fever, chills  . HEENT: Negative  · Respiratory and Thorax: No shortness of breath, no cough  · Cardiovascular: No chest pain, palpitation, no dizziness  · Gastrointestinal: see above  · Genitourinary: No hematuria  · Musculoskeletal: Negative  · Neurological: negative  · Psychiatric: no agitation, no anxiety    PAST MEDICAL/SURGICAL HISTORY:  CAD (coronary artery disease)    Hypertension    Glaucoma    Pancreatic mass    Duodenal ulcer    GERD (gastroesophageal reflux disease)    S/P inguinal hernia repair    H/O aortic valve replacement      MEDICATIONS  (STANDING):  amLODIPine   Tablet 2.5 milliGRAM(s) Oral daily  carvedilol 6.25 milliGRAM(s) Oral every 12 hours  dextrose 5% + sodium chloride 0.9%. 1000 milliLiter(s) (75 mL/Hr) IV Continuous <Continuous>  glycerin Suppository - Adult 1 Suppository(s) Rectal at bedtime  pantoprazole Infusion 8 mG/Hr (10 mL/Hr) IV Continuous <Continuous>  polyethylene glycol 3350 17 Gram(s) Oral daily  polyethylene glycol 3350 17 Gram(s) Oral daily  rosuvastatin 10 milliGRAM(s) Oral at bedtime  senna 2 Tablet(s) Oral at bedtime  tamsulosin 0.4 milliGRAM(s) Oral at bedtime  timolol 0.5% Solution 1 Drop(s) Both EYES at bedtime    MEDICATIONS  (PRN):  acetaminophen     Tablet .. 650 milliGRAM(s) Oral every 6 hours PRN Temp greater or equal to 38C (100.4F), Mild Pain (1 - 3)  melatonin 3 milliGRAM(s) Oral at bedtime PRN Insomnia  ondansetron Injectable 4 milliGRAM(s) IV Push every 6 hours PRN Nausea and/or Vomiting    Gluten (Stomach Upset; Diarrhea)  No Known Drug Allergies  Lipitor (Other)    T(C): 36.4 (11-05-24 @ 08:05), Max: 37.2 (11-05-24 @ 04:15)  HR: 83 (11-05-24 @ 08:05) (71 - 83)  BP: 108/60 (11-05-24 @ 08:05) (108/60 - 149/63)  RR: 18 (11-05-24 @ 08:05) (17 - 18)  SpO2: 94% (11-05-24 @ 08:05) (94% - 96%)    I&O's Summary    04 Nov 2024 07:01  -  05 Nov 2024 07:00  --------------------------------------------------------  IN: 100 mL / OUT: 0 mL / NET: 100 mL      PHYSICAL EXAM:  Constitutional: No acute distress  Neuro: Awake alert, oriented  HEENT: anicteric sclerae  CV: regular rate, regular rhythm  Pulm/chest: lung sounds diminished bilaterally, no accessory muscle use noted  Abd: soft, nontender, nondistended +bowel sounds. No rigidity, rebound tenderness, or guarding  Ext: no edema  Skin: warm, no jaundice   Psych: calm, cooperative    LABS:               7.7    9.40  )-----------( 78       ( 11-05 @ 10:28 )             23.6                7.1    7.93  )-----------( 78       ( 11-05 @ 08:15 )             21.8                9.7    13.76 )-----------( 92       ( 11-04 @ 13:14 )             30.5                6.6    7.34  )-----------( 82       ( 11-04 @ 04:47 )             20.3                8.5    13.48 )-----------( 102      ( 11-03 @ 20:05 )             26.3                8.6    12.92 )-----------( 105      ( 11-03 @ 17:45 )             26.9                6.2    8.63  )-----------( 86       ( 11-03 @ 07:00 )             19.3                6.5    8.95  )-----------( 89       ( 11-03 @ 04:50 )             20.5       11-05    140  |  111[H]  |  19.8  ----------------------------<  108[H]  4.0   |  21.0[L]  |  0.86    Ca    8.0[L]      05 Nov 2024 08:15  Mg     1.8     11-04

## 2024-11-05 NOTE — CONSULT NOTE ADULT - TIME BILLING
MDM
I spent 60 minutes reviewing the patient's chart, labs, imaging, interviewing and examining patient, and discussing plan of care with the patient, resident/PA team, and other providers, excluding separately billable procedures and teaching time.

## 2024-11-05 NOTE — PROGRESS NOTE ADULT - SUBJECTIVE AND OBJECTIVE BOX
This is an 78 yo F who follows Dr Foster/JAEL for a history of VICTORIA S/P IV iron  last Hb in the office on 10/8 was 13.1 Has received IV iron in the past   [place] [facility] [place]27 with few days of hematemesis and melena and was found to have anemia with Hgb of 7.4, s/p CT A/[place] [facility] [place] showing enlarged heterogenous pancreatic head, CBD dilation, duodenal thickening, gastric distention w/ liver hypodensities, all suspicious for neoplasm, being transferred to [facility] for advanced GI and surgical oncology eval    MEDICATIONS  (STANDING):  carvedilol 6.25 milliGRAM(s) Oral every 12 hours  pantoprazole Infusion 8 mG/Hr (10 mL/Hr) IV Continuous <Continuous>  rosuvastatin 10 milliGRAM(s) Oral at bedtime  timolol 0.5% Solution 1 Drop(s) Both EYES at bedtime    MEDICATIONS  (PRN):  acetaminophen     Tablet .. 650 milliGRAM(s) Oral every 6 hours PRN Temp greater or equal to 38C (100.4F), Mild Pain (1 - 3)  melatonin 3 milliGRAM(s) Oral at bedtime PRN Insomnia  ondansetron Injectable 4 milliGRAM(s) IV Push every 6 hours PRN Nausea and/or Vomiting    Vital Signs Last 24 Hrs  T(C): 37.2 (05 Nov 2024 04:15), Max: 37.2 (05 Nov 2024 04:15)  T(F): 98.9 (05 Nov 2024 04:15), Max: 98.9 (05 Nov 2024 04:15)  HR: 74 (05 Nov 2024 04:15) (64 - 77)  BP: 149/63 (05 Nov 2024 04:15) (145/79 - 153/66)  BP(mean): --  RR: 18 (05 Nov 2024 04:15) (17 - 18)  SpO2: 96% (05 Nov 2024 04:15) (95% - 100%)    Parameters below as of 05 Nov 2024 04:15  Patient On (Oxygen Delivery Method): room air    GENERAL:  Well-appearing elderly male, not in acute distress  EYES:  Clear conjunctiva, extraocular movement intact  ENT: Moist mucous membranes  RESP:   lungs clear to ausculation   CV: Regular rate and rhythm  GI: Soft, non-tender, non-distended  NEURO: Awake, alert, conversant   PSYCH: Calm, cooperative  SKIN: No rash or lesions, warm and dry    CBC                          7.1    7.93  )-----------( 78       ( 05 Nov 2024 08:15 )             21.8                             7.1    7.93  )-----------( 78       ( 05 Nov 2024 08:15 )             21.8                             6.6    7.34  )-----------( 82       ( 04 Nov 2024 04:47 )             20.3                             8.0    9.39  )-----------( 104      ( 01 Nov 2024 05:00 )             24.6         CHEM    11-05    140  |  111[H]  |  19.8  ----------------------------<  108[H]  4.0   |  21.0[L]  |  0.86    Ca    8.0[L]      05 Nov 2024 08:15  Mg     1.8     11-04 11-04    138  |  110[H]  |  25.4[H]  ----------------------------<  96  4.8   |  20.0[L]  |  0.83    Ca    8.3[L]      04 Nov 2024 04:47  Mg     1.8     11-04    TPro  4.5[L]  /  Alb  3.1[L]  /  TBili  0.3[L]  /  DBili  x   /  AST  17  /  ALT  10  /  AlkPhos  51  11-03

## 2024-11-06 DIAGNOSIS — C25.9 MALIGNANT NEOPLASM OF PANCREAS, UNSPECIFIED: ICD-10-CM

## 2024-11-06 LAB
HCT VFR BLD CALC: 21.8 % — LOW (ref 39–50)
HGB BLD-MCNC: 7.1 G/DL — LOW (ref 13–17)
MCHC RBC-ENTMCNC: 30.3 PG — SIGNIFICANT CHANGE UP (ref 27–34)
MCHC RBC-ENTMCNC: 32.6 G/DL — SIGNIFICANT CHANGE UP (ref 32–36)
MCV RBC AUTO: 93.2 FL — SIGNIFICANT CHANGE UP (ref 80–100)
PLATELET # BLD AUTO: 71 K/UL — LOW (ref 150–400)
RBC # BLD: 2.34 M/UL — LOW (ref 4.2–5.8)
RBC # FLD: 15.3 % — HIGH (ref 10.3–14.5)
WBC # BLD: 7.42 K/UL — SIGNIFICANT CHANGE UP (ref 3.8–10.5)
WBC # FLD AUTO: 7.42 K/UL — SIGNIFICANT CHANGE UP (ref 3.8–10.5)

## 2024-11-06 PROCEDURE — 99232 SBSQ HOSP IP/OBS MODERATE 35: CPT

## 2024-11-06 PROCEDURE — 77333 RADIATION TREATMENT AID(S): CPT | Mod: 26

## 2024-11-06 PROCEDURE — 99233 SBSQ HOSP IP/OBS HIGH 50: CPT

## 2024-11-06 PROCEDURE — 77290 THER RAD SIMULAJ FIELD CPLX: CPT | Mod: 26

## 2024-11-06 PROCEDURE — 77263 THER RADIOLOGY TX PLNG CPLX: CPT

## 2024-11-06 RX ORDER — MAGNESIUM, ALUMINUM HYDROXIDE 200-200 MG
30 TABLET,CHEWABLE ORAL EVERY 4 HOURS
Refills: 0 | Status: DISCONTINUED | OUTPATIENT
Start: 2024-11-06 | End: 2024-11-14

## 2024-11-06 RX ADMIN — Medication 0.4 MILLIGRAM(S): at 22:41

## 2024-11-06 RX ADMIN — Medication 30 MILLILITER(S): at 22:40

## 2024-11-06 RX ADMIN — Medication 1 DROP(S): at 21:34

## 2024-11-06 RX ADMIN — ONDANSETRON HYDROCHLORIDE 4 MILLIGRAM(S): 2 INJECTION, SOLUTION INTRAMUSCULAR; INTRAVENOUS at 22:40

## 2024-11-06 RX ADMIN — CARVEDILOL 6.25 MILLIGRAM(S): 25 TABLET, FILM COATED ORAL at 16:55

## 2024-11-06 RX ADMIN — CARVEDILOL 6.25 MILLIGRAM(S): 25 TABLET, FILM COATED ORAL at 05:12

## 2024-11-06 RX ADMIN — Medication 2.5 MILLIGRAM(S): at 05:12

## 2024-11-06 RX ADMIN — PANTOPRAZOLE SODIUM 10 MG/HR: 40 TABLET, DELAYED RELEASE ORAL at 19:57

## 2024-11-06 NOTE — PROGRESS NOTE ADULT - ASSESSMENT
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, with ongoing tumor bleeding and developing duodenal obstruction, anemic    PLAN  - follow up CBC, F/U GI for possible endoscopic stenting for palliation of duodenal obstruction  - Once stable acutely, bleeding controlled, obstruction addressed with stent, systemic therapy as outpatient for metastatic duodenal/pancreatic cancer with liver metastases  - follow up path  - if obstructs after duodenal stenting can consider operative GJ        78 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

## 2024-11-06 NOTE — PROGRESS NOTE ADULT - SUBJECTIVE AND OBJECTIVE BOX
INTERVAL HPI/OVERNIGHT EVENTS:    Patient evaluated at bedside. No acute distress. No acute events overnight.    MEDICATIONS  (STANDING):  amLODIPine   Tablet 2.5 milliGRAM(s) Oral daily  carvedilol 6.25 milliGRAM(s) Oral every 12 hours  dextrose 5% + sodium chloride 0.9%. 1000 milliLiter(s) (75 mL/Hr) IV Continuous <Continuous>  glycerin Suppository - Adult 1 Suppository(s) Rectal at bedtime  pantoprazole Infusion 8 mG/Hr (10 mL/Hr) IV Continuous <Continuous>  polyethylene glycol 3350 17 Gram(s) Oral daily  polyethylene glycol 3350 17 Gram(s) Oral daily  rosuvastatin 10 milliGRAM(s) Oral at bedtime  senna 2 Tablet(s) Oral at bedtime  tamsulosin 0.4 milliGRAM(s) Oral at bedtime  timolol 0.5% Solution 1 Drop(s) Both EYES at bedtime    MEDICATIONS  (PRN):  acetaminophen     Tablet .. 650 milliGRAM(s) Oral every 6 hours PRN Temp greater or equal to 38C (100.4F), Mild Pain (1 - 3)  melatonin 3 milliGRAM(s) Oral at bedtime PRN Insomnia  ondansetron Injectable 4 milliGRAM(s) IV Push every 6 hours PRN Nausea and/or Vomiting      Vital Signs Last 24 Hrs  T(C): 36.4 (06 Nov 2024 08:50), Max: 36.9 (06 Nov 2024 04:29)  T(F): 97.6 (06 Nov 2024 08:50), Max: 98.4 (06 Nov 2024 04:29)  HR: 62 (06 Nov 2024 08:50) (62 - 89)  BP: 138/64 (06 Nov 2024 08:50) (106/61 - 141/54)  BP(mean): --  RR: 18 (06 Nov 2024 08:50) (18 - 18)  SpO2: 100% (06 Nov 2024 08:50) (95% - 100%)    Parameters below as of 06 Nov 2024 08:50  Patient On (Oxygen Delivery Method): room air        Constitutional: NAD  HEENT: PERRL, EOMI  Respiratory: Respirations non-labored, no accessory muscle use  Gastrointestinal: Softly distended, non-tender  Neurological: A&O x 3      I&O's Detail    05 Nov 2024 07:01  -  06 Nov 2024 07:00  --------------------------------------------------------  IN:    dextrose 5% + sodium chloride 0.9%: 900 mL    Oral Fluid: 125 mL    Pantoprazole: 120 mL    PRBCs (Packed Red Blood Cells): 280 mL  Total IN: 1425 mL    OUT:    Voided (mL): 550 mL  Total OUT: 550 mL    Total NET: 875 mL          LABS:                        7.1    7.42  )-----------( 71       ( 06 Nov 2024 04:53 )             21.8     11-05    140  |  111[H]  |  19.8  ----------------------------<  108[H]  4.0   |  21.0[L]  |  0.86    Ca    8.0[L]      05 Nov 2024 08:15        Urinalysis Basic - ( 05 Nov 2024 08:15 )    Color: x / Appearance: x / SG: x / pH: x  Gluc: 108 mg/dL / Ketone: x  / Bili: x / Urobili: x   Blood: x / Protein: x / Nitrite: x   Leuk Esterase: x / RBC: x / WBC x   Sq Epi: x / Non Sq Epi: x / Bacteria: x        RADIOLOGY & ADDITIONAL STUDIES:

## 2024-11-06 NOTE — PROGRESS NOTE ADULT - SUBJECTIVE AND OBJECTIVE BOX
WILLIAM BEHRLE  ----------------------------------------  The patient was seen earlier at bedside. Patient with anemia. Offered no complaints. Denied chest pain or dyspnea.    Vital Signs Last 24 Hrs  T(C): 36.4 (06 Nov 2024 11:50), Max: 36.9 (06 Nov 2024 04:29)  T(F): 97.6 (06 Nov 2024 11:50), Max: 98.4 (06 Nov 2024 04:29)  HR: 66 (06 Nov 2024 11:50) (62 - 89)  BP: 149/67 (06 Nov 2024 11:50) (113/56 - 149/67)  BP(mean): --  RR: 18 (06 Nov 2024 11:50) (18 - 18)  SpO2: 100% (06 Nov 2024 11:50) (95% - 100%)    Parameters below as of 06 Nov 2024 11:50  Patient On (Oxygen Delivery Method): room air    PHYSICAL EXAMINATION:  ----------------------------------------  General appearance: No acute distress, Awake, Alert  HEENT: Normocephalic, Atraumatic, Conjunctiva clear, EOMI  Neck: Supple, No JVD, No tenderness  Lungs: Breath sound equal bilaterally, No wheezes, No rales  Cardiovascular: S1S2, Regular rhythm  Abdomen: Soft, Nontender, Nondistended, No guarding/rebound, Positive bowel sounds  Extremities: No clubbing, No cyanosis, No calf tenderness, Mild pedal edema  Neuro: Strength equal bilaterally, No tremors  Psychiatric: Appropriate mood, Normal affect    LABORATORY STUDIES:  ----------------------------------------             7.1    7.42  )-----------( 71       ( 06 Nov 2024 04:53 )             21.8     11-05    140  |  111[H]  |  19.8  ----------------------------<  108[H]  4.0   |  21.0[L]  |  0.86    Ca    8.0[L]      05 Nov 2024 08:15    Urinalysis Basic - ( 05 Nov 2024 08:15 )  Color: x / Appearance: x / SG: x / pH: x  Gluc: 108 mg/dL / Ketone: x  / Bili: x / Urobili: x   Blood: x / Protein: x / Nitrite: x   Leuk Esterase: x / RBC: x / WBC x   Sq Epi: x / Non Sq Epi: x / Bacteria: x    MEDICATIONS  (STANDING):  amLODIPine   Tablet 2.5 milliGRAM(s) Oral daily  carvedilol 6.25 milliGRAM(s) Oral every 12 hours  dextrose 5% + sodium chloride 0.9%. 1000 milliLiter(s) (75 mL/Hr) IV Continuous <Continuous>  glycerin Suppository - Adult 1 Suppository(s) Rectal at bedtime  pantoprazole Infusion 8 mG/Hr (10 mL/Hr) IV Continuous <Continuous>  polyethylene glycol 3350 17 Gram(s) Oral daily  polyethylene glycol 3350 17 Gram(s) Oral daily  rosuvastatin 10 milliGRAM(s) Oral at bedtime  senna 2 Tablet(s) Oral at bedtime  tamsulosin 0.4 milliGRAM(s) Oral at bedtime  timolol 0.5% Solution 1 Drop(s) Both EYES at bedtime    MEDICATIONS  (PRN):  acetaminophen     Tablet .. 650 milliGRAM(s) Oral every 6 hours PRN Temp greater or equal to 38C (100.4F), Mild Pain (1 - 3)  melatonin 3 milliGRAM(s) Oral at bedtime PRN Insomnia  ondansetron Injectable 4 milliGRAM(s) IV Push every 6 hours PRN Nausea and/or Vomiting      ASSESSMENT / PLAN:  ----------------------------------------  79M with a history of coronary artery disease, gastroesophageal reflux, pancreatic mass, and glaucoma who presented to [facility] with hematemesis and melena found to have anemia requiring transfusion of packed red blood cells. CT of the abdomen was notable for an enlarged heterogenous pancreatic head, common bile duct dilation, duodenal thickening, gastric distention, and liver hypodensities. The patient was transfered to [facility] for evaluation and underwent endoscopy with findings of a duodenal mass. Packed red blood cells were transfused for recurrent anemia and the patient was thought to benefit from radiation therapy for tumor bleeding.    Acute blood loss anemia  - Endoscopy(11/1) noted old blood, ulcerated mucosa in the duodenal bulb, and duodenal mass  - Monitoring hemoglobin levels, for repeat transfusion  - To continue on pantoprazole  - To avoid oral intake for now, on intravenous fluids  - Radiation Oncology evaluation noted, for radiation therapy tomorrow    Thrombocytopenia  - Monitoring platelet counts    Pancreatic / Duodenal mass  - Prior MRI (10/29) noted few small poorly delineated hepatic lesions, likely metastases, 4.5 x 3.5 cm infiltrative pancreatic head mass invading the proximal duodenum, interruption of the intrapancreatic portion of the common bile and main pancreatic ducts at the level of the mass with only minimal upstream ductal dilatation  - Biopsy results noted adenocarcinoma  - CA 19-9 level was elevated    Coronary artery disease / Hypertension  - Aspirin held due to bleeding and anemia  - On amlodipine  - Lisinopril and spironolactone previously held due to acute kidney injury    Glaucoma  - On timolol eye drops        Dispo: Anticipate eventual discharge home pending improvement in anemia and tolerance of oral intake

## 2024-11-06 NOTE — PROGRESS NOTE ADULT - SUBJECTIVE AND OBJECTIVE BOX
This is an 78 yo F who follows Dr Foster/JAEL for a history of VICTORIA S/P IV iron  last Hb in the office on 10/8 was 13.1 Has received IV iron in the past   presented to [facility] on 10/27 with few days of hematemesis and melena and was found to have anemia with Hgb of 7.4, s/p CT A/P at [facility] showing enlarged heterogenous pancreatic head, CBD dilation, duodenal thickening, gastric distention w/ liver hypodensities, all suspicious for neoplasm, being transferred to [facility] for advanced GI and surgical oncology eval    MEDICATIONS  (STANDING):  carvedilol 6.25 milliGRAM(s) Oral every 12 hours  pantoprazole Infusion 8 mG/Hr (10 mL/Hr) IV Continuous <Continuous>  rosuvastatin 10 milliGRAM(s) Oral at bedtime  timolol 0.5% Solution 1 Drop(s) Both EYES at bedtime    MEDICATIONS  (PRN):  acetaminophen     Tablet .. 650 milliGRAM(s) Oral every 6 hours PRN Temp greater or equal to 38C (100.4F), Mild Pain (1 - 3)  melatonin 3 milliGRAM(s) Oral at bedtime PRN Insomnia  ondansetron Injectable 4 milliGRAM(s) IV Push every 6 hours PRN Nausea and/or Vomiting    Vital Signs Last 24 Hrs  T(C): 36.4 (06 Nov 2024 08:50), Max: 36.9 (06 Nov 2024 04:29)  T(F): 97.6 (06 Nov 2024 08:50), Max: 98.4 (06 Nov 2024 04:29)  HR: 62 (06 Nov 2024 08:50) (62 - 89)  BP: 138/64 (06 Nov 2024 08:50) (106/61 - 141/54)  BP(mean): --  RR: 18 (06 Nov 2024 08:50) (18 - 18)  SpO2: 100% (06 Nov 2024 08:50) (95% - 100%)    Parameters below as of 06 Nov 2024 08:50  Patient On (Oxygen Delivery Method): room air    GENERAL:  Well-appearing elderly male, not in acute distress  EYES:  Clear conjunctiva, extraocular movement intact  ENT: Moist mucous membranes  RESP:   lungs clear to ausculation   CV: Regular rate and rhythm  GI: Soft, non-tender, non-distended  NEURO: Awake, alert, conversant   PSYCH: Calm, cooperative  SKIN: No rash or lesions, warm and dry    CBC                          7.1    7.42  )-----------( 71       ( 06 Nov 2024 04:53 )             21.8                           7.1    7.93  )-----------( 78       ( 05 Nov 2024 08:15 )             21.8                             7.1    7.93  )-----------( 78       ( 05 Nov 2024 08:15 )             21.8                             6.6    7.34  )-----------( 82       ( 04 Nov 2024 04:47 )             20.3       CHEM    11-05    140  |  111[H]  |  19.8  ----------------------------<  108[H]  4.0   |  21.0[L]  |  0.86    Ca    8.0[L]      05 Nov 2024 08:15        11-05    140  |  111[H]  |  19.8  ----------------------------<  108[H]  4.0   |  21.0[L]  |  0.86    Ca    8.0[L]      05 Nov 2024 08:15  Mg     1.8     11-04 11-04    138  |  110[H]  |  25.4[H]  ----------------------------<  96  4.8   |  20.0[L]  |  0.83    Ca    8.3[L]      04 Nov 2024 04:47  Mg     1.8     11-04    TPro  4.5[L]  /  Alb  3.1[L]  /  TBili  0.3[L]  /  DBili  x   /  AST  17  /  ALT  10  /  AlkPhos  51  11-03

## 2024-11-06 NOTE — PROGRESS NOTE ADULT - ASSESSMENT
This is an 80 yo F who follows Dr Foster/JAEL for a history of VICTORIA S/P IV iron  last Hb in the office on 10/8 was 13.1 Has received IV iron in the past   presented to [facility] on 10/27 with few days of hematemesis and melena and was found to have anemia with Hgb of 7.4, s/p CT A/P at [facility] showing enlarged heterogenous pancreatic head, CBD dilation, duodenal thickening, gastric distention w/ liver hypodensities, all suspicious for neoplasm, being transferred to [facility] for advanced GI and surgical oncology eval    Acute blood loss anemia due to GI bleeding from duodenal ulcer  - follows Dr Foster/JAEL  - History of VICTORIA -S/P IV iron    - last Hb in the office on 10/8 was 13.1    - EGD on 10/28 at [facility] showed single ulcer at duodenal bulb  - s/p 1 UPRBC for hgb 6.6  - Hgb 9.2 after transfusion Dropped to 7.1 this AM  - Continue PPI drip  -  GI following- He has an obstruction on upper endoscopy that prohibited the passage of a 9 mm standard EGD scope.  -Surgery following- possible endoscopic stenting for palliation of duodenal obstruction  - ideally, once bleeding stabilizes and duodenal obstruction addressed with stent, would plan for systemic therapy as outpatient for metastatic duodenal/pancreatic cancer with liver metastases    Pancreatic mass  - CT A/P at [facility] showing enlarged heterogenous pancreatic head, CBD dilation, duodenal thickening, gastric distention w/ liver hypodensities, all suspicious for neoplasm,  - Ca 19-9 markedly elevated 4903K  - MRI abdomen completed at [facility] Technically limited study. Findings highly likely to represent metastatic pancreatic cancer, infiltrative primary pancreatic head neoplasm invading the duodenum and small presumed hepatic metastases.  -  GI following  - S/p EGD/ EUS, showed duodenal mass,- Pathology is positive for malignant cells + Adeno  - Evaluated by Rad onc-  Causing GI bleeding, requiring multiple transfusions.  Has received 5U PRBC thus far.  Plan for treatment planning tomorrow (11/6), and initiation of RT on 11/7.     Will follow

## 2024-11-07 LAB
BLD GP AB SCN SERPL QL: SIGNIFICANT CHANGE UP
HCT VFR BLD CALC: 26 % — LOW (ref 39–50)
HGB BLD-MCNC: 8.5 G/DL — LOW (ref 13–17)
MCHC RBC-ENTMCNC: 30.8 PG — SIGNIFICANT CHANGE UP (ref 27–34)
MCHC RBC-ENTMCNC: 32.7 G/DL — SIGNIFICANT CHANGE UP (ref 32–36)
MCV RBC AUTO: 94.2 FL — SIGNIFICANT CHANGE UP (ref 80–100)
PLATELET # BLD AUTO: 76 K/UL — LOW (ref 150–400)
RBC # BLD: 2.76 M/UL — LOW (ref 4.2–5.8)
RBC # FLD: 15.4 % — HIGH (ref 10.3–14.5)
WBC # BLD: 9.03 K/UL — SIGNIFICANT CHANGE UP (ref 3.8–10.5)
WBC # FLD AUTO: 9.03 K/UL — SIGNIFICANT CHANGE UP (ref 3.8–10.5)

## 2024-11-07 PROCEDURE — 99232 SBSQ HOSP IP/OBS MODERATE 35: CPT

## 2024-11-07 PROCEDURE — 77280 THER RAD SIMULAJ FIELD SMPL: CPT | Mod: 26

## 2024-11-07 PROCEDURE — 74018 RADEX ABDOMEN 1 VIEW: CPT | Mod: 26

## 2024-11-07 PROCEDURE — 99233 SBSQ HOSP IP/OBS HIGH 50: CPT

## 2024-11-07 PROCEDURE — 77307 TELETHX ISODOSE PLAN CPLX: CPT | Mod: 26

## 2024-11-07 PROCEDURE — 77334 RADIATION TREATMENT AID(S): CPT | Mod: 26

## 2024-11-07 RX ORDER — METOCLOPRAMIDE HCL 10 MG
5 TABLET ORAL EVERY 6 HOURS
Refills: 0 | Status: DISCONTINUED | OUTPATIENT
Start: 2024-11-07 | End: 2024-11-14

## 2024-11-07 RX ADMIN — CARVEDILOL 6.25 MILLIGRAM(S): 25 TABLET, FILM COATED ORAL at 16:19

## 2024-11-07 RX ADMIN — Medication 30 MILLILITER(S): at 05:01

## 2024-11-07 RX ADMIN — Medication 30 MILLILITER(S): at 16:20

## 2024-11-07 RX ADMIN — Medication 1 DROP(S): at 21:24

## 2024-11-07 RX ADMIN — PANTOPRAZOLE SODIUM 10 MG/HR: 40 TABLET, DELAYED RELEASE ORAL at 06:07

## 2024-11-07 RX ADMIN — Medication 0.4 MILLIGRAM(S): at 21:22

## 2024-11-07 RX ADMIN — Medication 5 MILLIGRAM(S): at 16:20

## 2024-11-07 RX ADMIN — Medication 5 MILLIGRAM(S): at 10:41

## 2024-11-07 RX ADMIN — Medication 2 TABLET(S): at 21:23

## 2024-11-07 RX ADMIN — Medication 10 MILLIGRAM(S): at 21:23

## 2024-11-07 RX ADMIN — CARVEDILOL 6.25 MILLIGRAM(S): 25 TABLET, FILM COATED ORAL at 05:01

## 2024-11-07 RX ADMIN — ONDANSETRON HYDROCHLORIDE 4 MILLIGRAM(S): 2 INJECTION, SOLUTION INTRAMUSCULAR; INTRAVENOUS at 05:02

## 2024-11-07 RX ADMIN — Medication 2.5 MILLIGRAM(S): at 05:01

## 2024-11-07 RX ADMIN — Medication 30 MILLILITER(S): at 21:22

## 2024-11-07 NOTE — PROGRESS NOTE ADULT - SUBJECTIVE AND OBJECTIVE BOX
Subjective: Patient is still pending CTA read and IR consult for embolization. Hgb up tp 8.5 from 7.1 yesterdat       MEDICATIONS  (STANDING):  amLODIPine   Tablet 2.5 milliGRAM(s) Oral daily  carvedilol 6.25 milliGRAM(s) Oral every 12 hours  dextrose 5% + sodium chloride 0.9%. 1000 milliLiter(s) (75 mL/Hr) IV Continuous <Continuous>  glycerin Suppository - Adult 1 Suppository(s) Rectal at bedtime  pantoprazole Infusion 8 mG/Hr (10 mL/Hr) IV Continuous <Continuous>  polyethylene glycol 3350 17 Gram(s) Oral daily  polyethylene glycol 3350 17 Gram(s) Oral daily  rosuvastatin 10 milliGRAM(s) Oral at bedtime  senna 2 Tablet(s) Oral at bedtime  tamsulosin 0.4 milliGRAM(s) Oral at bedtime  timolol 0.5% Solution 1 Drop(s) Both EYES at bedtime    MEDICATIONS  (PRN):  acetaminophen     Tablet .. 650 milliGRAM(s) Oral every 6 hours PRN Temp greater or equal to 38C (100.4F), Mild Pain (1 - 3)  aluminum hydroxide/magnesium hydroxide/simethicone Suspension 30 milliLiter(s) Oral every 4 hours PRN Dyspepsia  melatonin 3 milliGRAM(s) Oral at bedtime PRN Insomnia  ondansetron Injectable 4 milliGRAM(s) IV Push every 6 hours PRN Nausea and/or Vomiting      Vital Signs Last 24 Hrs  T(C): 37.2 (07 Nov 2024 04:44), Max: 37.3 (06 Nov 2024 20:30)  T(F): 99 (07 Nov 2024 04:44), Max: 99.1 (06 Nov 2024 20:30)  HR: 81 (07 Nov 2024 04:44) (62 - 81)  BP: 135/66 (07 Nov 2024 04:44) (135/66 - 156/65)  BP(mean): --  RR: 18 (07 Nov 2024 04:44) (18 - 18)  SpO2: 97% (07 Nov 2024 04:44) (96% - 100%)    Parameters below as of 07 Nov 2024 04:44  Patient On (Oxygen Delivery Method): room air        Physical Exam:    Constitutional: NAD  Respiratory: Respirations non-labored, no accessory muscle use        LABS:                        8.5    9.03  )-----------( 76       ( 07 Nov 2024 05:45 )             26.0                 Assessment: · Assessment	  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

## 2024-11-07 NOTE — PROGRESS NOTE ADULT - ASSESSMENT
This is an 78 yo F who follows Dr Foster/JAEL for a history of VICTORIA S/P IV iron  last Hb in the office on 10/8 was 13.1 Has received IV iron in the past   [place] [facility] [place] with few days of hematemesis and melena and was found to have anemia with Hgb of 7.4, s/p CT A/[place] [facility] showing enlarged heterogenous pancreatic head, CBD dilation, duodenal thickening, gastric distention w/ liver hypodensities, all suspicious for neoplasm, being transferred to [facility] for advanced GI and surgical oncology eval    Acute blood loss anemia due to GI bleeding from duodenal ulcer  - follows Dr Foster/JAEL  - History of VICTORIA -S/P IV iron    - last Hb in the office on 10/8 was 13.1    - EGD on 10[place]28 [place] [facility] showed single ulcer at duodenal bulb  - Hgb 8.5  - Continue PPI drip  -  GI following- He has an obstruction on upper endoscopy that prohibited the passage of a 9 mm standard EGD scope.  -Surgery following- possible endoscopic stenting for palliation of duodenal obstruction  - ideally, once bleeding stabilizes and duodenal obstruction addressed with stent, would plan for systemic therapy as outpatient for metastatic duodenal/pancreatic cancer with liver metastases    Pancreatic mass  - CT A/[place] [facility] showing enlarged heterogenous pancreatic head, CBD dilation, duodenal thickening, gastric distention w/ liver hypodensities, all suspicious for neoplasm,  - Ca 19-9 markedly elevated 4903K  - MRI abdomen [place] at [facility] Technically limited study. Findings highly likely to represent metastatic pancreatic cancer, infiltrative primary pancreatic head neoplasm invading the duodenum and small presumed hepatic metastases.  -  GI following  - S/p EGD/ EUS, showed duodenal mass,- Pathology is positive for malignant cells + Adeno  - Evaluated by Rad onc-  Causing GI bleeding, requiring multiple transfusions.  Has received 5U PRBC thus far.  S/P SIM 11/6   - initiation of RT today    Will follow

## 2024-11-07 NOTE — PROGRESS NOTE ADULT - SUBJECTIVE AND OBJECTIVE BOX
This is an 78 yo F who follows Dr Foster/JAEL for a history of VICTORIA S/P IV iron  last Hb in the office on 10/8 was 13.1 Has received IV iron in the past   [place] [facility] [place] with few days of hematemesis and melena and was found to have anemia with Hgb of 7.4, [place] [facility] [place] enlarged heterogenous pancreatic head, CBD dilation, duodenal thickening, gastric distention w/ liver hypodensities, all suspicious for neoplasm, being transferred to [facility] for advanced GI and surgical oncology eval    11/7/2024 pt vomitting parviz DELGADO- RN aware plan to give reglan    MEDICATIONS  (STANDING):  carvedilol 6.25 milliGRAM(s) Oral every 12 hours  pantoprazole Infusion 8 mG/Hr (10 mL/Hr) IV Continuous <Continuous>  rosuvastatin 10 milliGRAM(s) Oral at bedtime  timolol 0.5% Solution 1 Drop(s) Both EYES at bedtime    MEDICATIONS  (PRN):  acetaminophen     Tablet .. 650 milliGRAM(s) Oral every 6 hours PRN Temp greater or equal to 38C (100.4F), Mild Pain (1 - 3)  melatonin 3 milliGRAM(s) Oral at bedtime PRN Insomnia  ondansetron Injectable 4 milliGRAM(s) IV Push every 6 hours PRN Nausea and/or Vomiting    Vital Signs Last 24 Hrs  T(C): 36.6 (07 Nov 2024 08:10), Max: 37.3 (06 Nov 2024 20:30)  T(F): 97.8 (07 Nov 2024 08:10), Max: 99.1 (06 Nov 2024 20:30)  HR: 68 (07 Nov 2024 08:10) (65 - 81)  BP: 146/60 (07 Nov 2024 08:10) (135/66 - 156/65)  BP(mean): --  RR: 18 (07 Nov 2024 08:10) (18 - 18)  SpO2: 99% (07 Nov 2024 08:10) (96% - 100%)    Parameters below as of 07 Nov 2024 08:10  Patient On (Oxygen Delivery Method): room air    GENERAL:  Well-appearing elderly male, not in acute distress  EYES:  Clear conjunctiva, extraocular movement intact  ENT: Moist mucous membranes  RESP:   lungs clear to ausculation   CV: Regular rate and rhythm  GI: Soft, non-tender, non-distended  NEURO: Awake, alert, conversant   PSYCH: Calm, cooperative  SKIN: No rash or lesions, warm and dry    CBC                          8.5    9.03  )-----------( 76       ( 07 Nov 2024 05:45 )             26.0                           7.1    7.42  )-----------( 71       ( 06 Nov 2024 04:53 )             21.8                           7.1    7.93  )-----------( 78       ( 05 Nov 2024 08:15 )             21.8                             7.1    7.93  )-----------( 78       ( 05 Nov 2024 08:15 )             21.8                             6.6    7.34  )-----------( 82       ( 04 Nov 2024 04:47 )             20.3       CHEM        11-05    140  |  111[H]  |  19.8  ----------------------------<  108[H]  4.0   |  21.0[L]  |  0.86    Ca    8.0[L]      05 Nov 2024 08:15        11-05    140  |  111[H]  |  19.8  ----------------------------<  108[H]  4.0   |  21.0[L]  |  0.86    Ca    8.0[L]      05 Nov 2024 08:15  Mg     1.8     11-04 11-04    138  |  110[H]  |  25.4[H]  ----------------------------<  96  4.8   |  20.0[L]  |  0.83    Ca    8.3[L]      04 Nov 2024 04:47  Mg     1.8     11-04    TPro  4.5[L]  /  Alb  3.1[L]  /  TBili  0.3[L]  /  DBili  x   /  AST  17  /  ALT  10  /  AlkPhos  51  11-03

## 2024-11-08 LAB
ANION GAP SERPL CALC-SCNC: 14 MMOL/L — SIGNIFICANT CHANGE UP (ref 5–17)
BUN SERPL-MCNC: 34.2 MG/DL — HIGH (ref 8–20)
CALCIUM SERPL-MCNC: 7.9 MG/DL — LOW (ref 8.4–10.5)
CHLORIDE SERPL-SCNC: 109 MMOL/L — HIGH (ref 96–108)
CO2 SERPL-SCNC: 26 MMOL/L — SIGNIFICANT CHANGE UP (ref 22–29)
CREAT SERPL-MCNC: 1.48 MG/DL — HIGH (ref 0.5–1.3)
EGFR: 48 ML/MIN/1.73M2 — LOW
GLUCOSE SERPL-MCNC: 147 MG/DL — HIGH (ref 70–99)
HCT VFR BLD CALC: 26.3 % — LOW (ref 39–50)
HGB BLD-MCNC: 8.8 G/DL — LOW (ref 13–17)
MCHC RBC-ENTMCNC: 31.2 PG — SIGNIFICANT CHANGE UP (ref 27–34)
MCHC RBC-ENTMCNC: 33.5 G/DL — SIGNIFICANT CHANGE UP (ref 32–36)
MCV RBC AUTO: 93.3 FL — SIGNIFICANT CHANGE UP (ref 80–100)
PLATELET # BLD AUTO: 84 K/UL — LOW (ref 150–400)
POTASSIUM SERPL-MCNC: 3.4 MMOL/L — LOW (ref 3.5–5.3)
POTASSIUM SERPL-SCNC: 3.4 MMOL/L — LOW (ref 3.5–5.3)
RBC # BLD: 2.82 M/UL — LOW (ref 4.2–5.8)
RBC # FLD: 15.4 % — HIGH (ref 10.3–14.5)
SODIUM SERPL-SCNC: 149 MMOL/L — HIGH (ref 135–145)
WBC # BLD: 8.21 K/UL — SIGNIFICANT CHANGE UP (ref 3.8–10.5)
WBC # FLD AUTO: 8.21 K/UL — SIGNIFICANT CHANGE UP (ref 3.8–10.5)

## 2024-11-08 PROCEDURE — 71045 X-RAY EXAM CHEST 1 VIEW: CPT | Mod: 26

## 2024-11-08 PROCEDURE — 99232 SBSQ HOSP IP/OBS MODERATE 35: CPT

## 2024-11-08 PROCEDURE — 99233 SBSQ HOSP IP/OBS HIGH 50: CPT

## 2024-11-08 RX ORDER — FUROSEMIDE 40 MG
20 TABLET ORAL ONCE
Refills: 0 | Status: COMPLETED | OUTPATIENT
Start: 2024-11-08 | End: 2024-11-08

## 2024-11-08 RX ORDER — SODIUM CHLORIDE, SODIUM GLUCONATE, SODIUM ACETATE, POTASSIUM CHLORIDE AND MAGNESIUM CHLORIDE 30; 37; 368; 526; 502 MG/100ML; MG/100ML; MG/100ML; MG/100ML; MG/100ML
1000 INJECTION, SOLUTION INTRAVENOUS
Refills: 0 | Status: DISCONTINUED | OUTPATIENT
Start: 2024-11-08 | End: 2024-11-10

## 2024-11-08 RX ORDER — MORPHINE SULFATE 30 MG/1
2 TABLET, EXTENDED RELEASE ORAL ONCE
Refills: 0 | Status: DISCONTINUED | OUTPATIENT
Start: 2024-11-08 | End: 2024-11-08

## 2024-11-08 RX ADMIN — CARVEDILOL 6.25 MILLIGRAM(S): 25 TABLET, FILM COATED ORAL at 05:23

## 2024-11-08 RX ADMIN — Medication 1 DROP(S): at 22:23

## 2024-11-08 RX ADMIN — Medication 2.5 MILLIGRAM(S): at 05:22

## 2024-11-08 RX ADMIN — Medication 10 MILLIGRAM(S): at 22:23

## 2024-11-08 RX ADMIN — Medication 2 TABLET(S): at 22:23

## 2024-11-08 RX ADMIN — MORPHINE SULFATE 2 MILLIGRAM(S): 30 TABLET, EXTENDED RELEASE ORAL at 02:42

## 2024-11-08 RX ADMIN — Medication 20 MILLIGRAM(S): at 02:42

## 2024-11-08 RX ADMIN — SODIUM CHLORIDE, SODIUM GLUCONATE, SODIUM ACETATE, POTASSIUM CHLORIDE AND MAGNESIUM CHLORIDE 75 MILLILITER(S): 30; 37; 368; 526; 502 INJECTION, SOLUTION INTRAVENOUS at 14:15

## 2024-11-08 RX ADMIN — SODIUM CHLORIDE, SODIUM GLUCONATE, SODIUM ACETATE, POTASSIUM CHLORIDE AND MAGNESIUM CHLORIDE 75 MILLILITER(S): 30; 37; 368; 526; 502 INJECTION, SOLUTION INTRAVENOUS at 22:27

## 2024-11-08 RX ADMIN — PANTOPRAZOLE SODIUM 10 MG/HR: 40 TABLET, DELAYED RELEASE ORAL at 22:27

## 2024-11-08 RX ADMIN — MORPHINE SULFATE 2 MILLIGRAM(S): 30 TABLET, EXTENDED RELEASE ORAL at 03:42

## 2024-11-08 RX ADMIN — Medication 0.4 MILLIGRAM(S): at 22:23

## 2024-11-08 RX ADMIN — CARVEDILOL 6.25 MILLIGRAM(S): 25 TABLET, FILM COATED ORAL at 17:51

## 2024-11-08 NOTE — PROGRESS NOTE ADULT - SUBJECTIVE AND OBJECTIVE BOX
This is an 78 yo F who follows Dr Foster/JAEL for a history of VICTORIA S/P IV iron  last Hb in the office on 10/8 was 13.1 Has received IV iron in the past   presented to [facility] on 10/27 with few days of hematemesis and melena and was found to have anemia with Hgb of 7.4, s/p CT A/P at [facility] showing enlarged heterogenous pancreatic head, CBD dilation, duodenal thickening, gastric distention w/ liver hypodensities, all suspicious for neoplasm, being transferred to [facility] for advanced GI and surgical oncology eval    MEDICATIONS  (STANDING):  carvedilol 6.25 milliGRAM(s) Oral every 12 hours  pantoprazole Infusion 8 mG/Hr (10 mL/Hr) IV Continuous <Continuous>  rosuvastatin 10 milliGRAM(s) Oral at bedtime  timolol 0.5% Solution 1 Drop(s) Both EYES at bedtime    MEDICATIONS  (PRN):  acetaminophen     Tablet .. 650 milliGRAM(s) Oral every 6 hours PRN Temp greater or equal to 38C (100.4F), Mild Pain (1 - 3)  melatonin 3 milliGRAM(s) Oral at bedtime PRN Insomnia  ondansetron Injectable 4 milliGRAM(s) IV Push every 6 hours PRN Nausea and/or Vomiting    Vital Signs Last 24 Hrs  T(C): 36.7 (08 Nov 2024 09:00), Max: 37.4 (08 Nov 2024 04:51)  T(F): 98 (08 Nov 2024 09:00), Max: 99.4 (08 Nov 2024 04:51)  HR: 78 (08 Nov 2024 09:00) (69 - 91)  BP: 105/53 (08 Nov 2024 09:00) (104/51 - 133/64)  BP(mean): --  RR: 18 (08 Nov 2024 09:00) (17 - 18)  SpO2: 94% (08 Nov 2024 09:00) (87% - 97%)    Parameters below as of 08 Nov 2024 09:00  Patient On (Oxygen Delivery Method): nasal cannula  O2 Flow (L/min): 2    GENERAL:  Well-appearing elderly male, not in acute distress  EYES:  Clear conjunctiva, extraocular movement intact  ENT: Moist mucous membranes  RESP:   lungs clear to ausculation   CV: Regular rate and rhythm  GI: Soft, non-tender, non-distended  NEURO: Awake, alert, conversant   PSYCH: Calm, cooperative  SKIN: No rash or lesions, warm and dry    CBC                          8.8    8.21  )-----------( 84       ( 08 Nov 2024 04:50 )             26.3                           8.5    9.03  )-----------( 76       ( 07 Nov 2024 05:45 )             26.0                           7.1    7.42  )-----------( 71       ( 06 Nov 2024 04:53 )             21.8                           7.1    7.93  )-----------( 78       ( 05 Nov 2024 08:15 )             21.8                             7.1    7.93  )-----------( 78       ( 05 Nov 2024 08:15 )             21.8                             6.6    7.34  )-----------( 82       ( 04 Nov 2024 04:47 )             20.3       CHEM    11-08    149[H]  |  109[H]  |  34.2[H]  ----------------------------<  147[H]  3.4[L]   |  26.0  |  1.48[H]    Ca    7.9[L]      08 Nov 2024 04:50            11-05    140  |  111[H]  |  19.8  ----------------------------<  108[H]  4.0   |  21.0[L]  |  0.86    Ca    8.0[L]      05 Nov 2024 08:15        11-05    140  |  111[H]  |  19.8  ----------------------------<  108[H]  4.0   |  21.0[L]  |  0.86    Ca    8.0[L]      05 Nov 2024 08:15  Mg     1.8     11-04 11-04    138  |  110[H]  |  25.4[H]  ----------------------------<  96  4.8   |  20.0[L]  |  0.83    Ca    8.3[L]      04 Nov 2024 04:47  Mg     1.8     11-04    TPro  4.5[L]  /  Alb  3.1[L]  /  TBili  0.3[L]  /  DBili  x   /  AST  17  /  ALT  10  /  AlkPhos  51  11-03

## 2024-11-08 NOTE — PROGRESS NOTE ADULT - ASSESSMENT
This is an 80 yo F who follows Dr Foster/JAEL for a history of VICTORIA S/P IV iron  last Hb in the office on 10/8 was 13.1 Has received IV iron in the past   presented to [facility] on 10/27 with few days of hematemesis and melena and was found to have anemia with Hgb of 7.4, s/p CT A/P at [facility] showing enlarged heterogenous pancreatic head, CBD dilation, duodenal thickening, gastric distention w/ liver hypodensities, all suspicious for neoplasm, being transferred to [facility] for advanced GI and surgical oncology eval    Acute blood loss anemia due to GI bleeding from duodenal ulcer  - follows Dr Foster/JAEL  - History of VICTORIA -S/P IV iron    - last Hb in the office on 10/8 was 13.1    - EGD on 10/28 at [facility] showed single ulcer at duodenal bulb  - Hgb 8.8  -  GI following- He has an obstruction on upper endoscopy that prohibited the passage of a 9 mm standard EGD scope.  -Surgery following- possible endoscopic stenting for palliation of duodenal obstruction  - ideally, once bleeding stabilizes and duodenal obstruction addressed with stent, would plan for systemic therapy as outpatient for metastatic duodenal/pancreatic cancer with liver metastases    Pancreatic mass  - CT A/P at [facility] showing enlarged heterogenous pancreatic head, CBD dilation, duodenal thickening, gastric distention w/ liver hypodensities, all suspicious for neoplasm,  - Ca 19-9 markedly elevated [id number]  - MRI abdomen completed at [facility] Technically limited study. Findings highly likely to represent metastatic pancreatic cancer, infiltrative primary pancreatic head neoplasm invading the duodenum and small presumed hepatic metastases.  -  GI following  - S/p EGD/ EUS, showed duodenal mass,- Pathology is positive for malignant cells + Adeno  - Evaluated by Rad onc-  Causing GI bleeding, requiring multiple transfusions.  Has received 6u PRBC thus far.    -S/P SIM 11/6   -S/P RT 11/7    Will follow

## 2024-11-08 NOTE — PROGRESS NOTE ADULT - SUBJECTIVE AND OBJECTIVE BOX
WILLIAM BEHRLE  ----------------------------------------  The patient was seen earlier at bedside. Patient with duodenal/pancreatic mass and bleeding. Reported improvement in nausea. Noted episode of dyspnea and hypoxia overnight.    Vital Signs Last 24 Hrs  T(C): 36.7 (08 Nov 2024 09:00), Max: 37.4 (08 Nov 2024 04:51)  T(F): 98 (08 Nov 2024 09:00), Max: 99.4 (08 Nov 2024 04:51)  HR: 78 (08 Nov 2024 09:00) (69 - 91)  BP: 105/53 (08 Nov 2024 09:00) (104/51 - 133/64)  BP(mean): --  RR: 18 (08 Nov 2024 09:00) (17 - 18)  SpO2: 94% (08 Nov 2024 09:00) (87% - 97%)    Parameters below as of 08 Nov 2024 09:00  Patient On (Oxygen Delivery Method): nasal cannula  O2 Flow (L/min): 2    PHYSICAL EXAMINATION:  ----------------------------------------  General appearance: No acute distress, Awake, Alert  HEENT: Normocephalic, Atraumatic, Conjunctiva clear, EOMI  Neck: Supple, No JVD, No tenderness  Lungs: Breath sound equal bilaterally, No wheezes, No rales  Cardiovascular: S1S2, Regular rhythm  Abdomen: Soft, Nontender, Nondistended, No guarding/rebound, Positive bowel sounds  Extremities: No clubbing, No cyanosis, No calf tenderness, Pedal edema  Neuro: Strength equal bilaterally, No tremors  Psychiatric: Appropriate mood, Normal affect    LABORATORY STUDIES:  ----------------------------------------             8.8    8.21  )-----------( 84       ( 08 Nov 2024 04:50 )             26.3     11-08    149[H]  |  109[H]  |  34.2[H]  ----------------------------<  147[H]  3.4[L]   |  26.0  |  1.48[H]    Ca    7.9[L]      08 Nov 2024 04:50    Urinalysis Basic - ( 08 Nov 2024 04:50 )  Color: x / Appearance: x / SG: x / pH: x  Gluc: 147 mg/dL / Ketone: x  / Bili: x / Urobili: x   Blood: x / Protein: x / Nitrite: x   Leuk Esterase: x / RBC: x / WBC x   Sq Epi: x / Non Sq Epi: x / Bacteria: x    MEDICATIONS  (STANDING):  amLODIPine   Tablet 2.5 milliGRAM(s) Oral daily  carvedilol 6.25 milliGRAM(s) Oral every 12 hours  dextrose 5% + sodium chloride 0.9% with potassium chloride 20 mEq/L 1000 milliLiter(s) (75 mL/Hr) IV Continuous <Continuous>  glycerin Suppository - Adult 1 Suppository(s) Rectal at bedtime  pantoprazole Infusion 8 mG/Hr (10 mL/Hr) IV Continuous <Continuous>  rosuvastatin 10 milliGRAM(s) Oral at bedtime  senna 2 Tablet(s) Oral at bedtime  tamsulosin 0.4 milliGRAM(s) Oral at bedtime  timolol 0.5% Solution 1 Drop(s) Both EYES at bedtime    MEDICATIONS  (PRN):  acetaminophen     Tablet .. 650 milliGRAM(s) Oral every 6 hours PRN Temp greater or equal to 38C (100.4F), Mild Pain (1 - 3)  aluminum hydroxide/magnesium hydroxide/simethicone Suspension 30 milliLiter(s) Oral every 4 hours PRN Dyspepsia  melatonin 3 milliGRAM(s) Oral at bedtime PRN Insomnia  metoclopramide Injectable 5 milliGRAM(s) IV Push every 6 hours PRN nausea      ASSESSMENT / PLAN:  ----------------------------------------  79M with a history of coronary artery disease, gastroesophageal reflux, pancreatic mass, and glaucoma who presented to [facility] with hematemesis and melena found to have anemia requiring transfusion of packed red blood cells. CT of the abdomen was notable for an enlarged heterogenous pancreatic head, common bile duct dilation, duodenal thickening, gastric distention, and liver hypodensities. The patient was transfered to [facility] for evaluation and underwent endoscopy with findings of a duodenal mass. Packed red blood cells were transfused for recurrent anemia and the patient underwent radiation therapy for tumor bleeding.    Acute blood loss anemia  - Endoscopy(11/1) noted old blood, ulcerated mucosa in the duodenal bulb, and duodenal mass  - Hemoglobin level improved with transfusions  - To continue on pantoprazole  - To avoid oral intake for now, on intravenous fluids  - Radiation therapy performed  - Discussed with Gastroenterology, to determine optimal time for duodenal stent placement to relieve obstruction    Thrombocytopenia  - Monitoring platelet counts  - Stable, slightly improved    Hypoxia  - Improved, titrating supplemental oxygen  - Afebrile and without leukocytosis  - Furosemide administered overnight  - Possible aspiration pneumonitis  - Continue close monitoring    Acute kidney injury  - Continue to monitor renal functions  - No urological symptoms or signs of obstruction  - On intravenous fluids    Pancreatic / Duodenal mass  - Prior MRI(10/29) noted few small poorly delineated hepatic lesions, likely metastases, 4.5 x 3.5 cm infiltrative pancreatic head mass invading the proximal duodenum, interruption of the intrapancreatic portion of the common bile and main pancreatic ducts at the level of the mass with only minimal upstream ductal dilatation  - CA 19-9 level was elevated  - Biopsy results noted adenocarcinoma, patient informed  - Oncology evaluation noted    Coronary artery disease / Hypertension  - Aspirin held due to bleeding and anemia  - On amlodipine  - Lisinopril and spironolactone previously held due to acute kidney injury    Glaucoma  - On timolol eye drops        Dispo: Anticipate eventual discharge home pending improvement in anemia and tolerance of oral intake WILLIAM BEHRLE  ----------------------------------------  The patient was seen earlier at bedside. Patient with duodenal/pancreatic mass and bleeding. Reported improvement in nausea. Noted episode of dyspnea and hypoxia overnight.    Vital Signs Last 24 Hrs  T(C): 36.7 (08 Nov 2024 09:00), Max: 37.4 (08 Nov 2024 04:51)  T(F): 98 (08 Nov 2024 09:00), Max: 99.4 (08 Nov 2024 04:51)  HR: 78 (08 Nov 2024 09:00) (69 - 91)  BP: 105/53 (08 Nov 2024 09:00) (104/51 - 133/64)  BP(mean): --  RR: 18 (08 Nov 2024 09:00) (17 - 18)  SpO2: 94% (08 Nov 2024 09:00) (87% - 97%)    Parameters below as of 08 Nov 2024 09:00  Patient On (Oxygen Delivery Method): nasal cannula  O2 Flow (L/min): 2    PHYSICAL EXAMINATION:  ----------------------------------------  General appearance: No acute distress, Awake, Alert  HEENT: Normocephalic, Atraumatic, Conjunctiva clear, EOMI  Neck: Supple, No JVD, No tenderness  Lungs: Breath sound equal bilaterally, No wheezes, No rales  Cardiovascular: S1S2, Regular rhythm  Abdomen: Soft, Nontender, Nondistended, No guarding/rebound, Positive bowel sounds  Extremities: No clubbing, No cyanosis, No calf tenderness, Pedal edema  Neuro: Strength equal bilaterally, No tremors  Psychiatric: Appropriate mood, Normal affect    LABORATORY STUDIES:  ----------------------------------------             8.8    8.21  )-----------( 84       ( 08 Nov 2024 04:50 )             26.3     11-08    149[H]  |  109[H]  |  34.2[H]  ----------------------------<  147[H]  3.4[L]   |  26.0  |  1.48[H]    Ca    7.9[L]      08 Nov 2024 04:50    Urinalysis Basic - ( 08 Nov 2024 04:50 )  Color: x / Appearance: x / SG: x / pH: x  Gluc: 147 mg/dL / Ketone: x  / Bili: x / Urobili: x   Blood: x / Protein: x / Nitrite: x   Leuk Esterase: x / RBC: x / WBC x   Sq Epi: x / Non Sq Epi: x / Bacteria: x    MEDICATIONS  (STANDING):  amLODIPine   Tablet 2.5 milliGRAM(s) Oral daily  carvedilol 6.25 milliGRAM(s) Oral every 12 hours  dextrose 5% + sodium chloride 0.9% with potassium chloride 20 mEq/L 1000 milliLiter(s) (75 mL/Hr) IV Continuous <Continuous>  glycerin Suppository - Adult 1 Suppository(s) Rectal at bedtime  pantoprazole Infusion 8 mG/Hr (10 mL/Hr) IV Continuous <Continuous>  rosuvastatin 10 milliGRAM(s) Oral at bedtime  senna 2 Tablet(s) Oral at bedtime  tamsulosin 0.4 milliGRAM(s) Oral at bedtime  timolol 0.5% Solution 1 Drop(s) Both EYES at bedtime    MEDICATIONS  (PRN):  acetaminophen     Tablet .. 650 milliGRAM(s) Oral every 6 hours PRN Temp greater or equal to 38C (100.4F), Mild Pain (1 - 3)  aluminum hydroxide/magnesium hydroxide/simethicone Suspension 30 milliLiter(s) Oral every 4 hours PRN Dyspepsia  melatonin 3 milliGRAM(s) Oral at bedtime PRN Insomnia  metoclopramide Injectable 5 milliGRAM(s) IV Push every 6 hours PRN nausea      ASSESSMENT / PLAN:  ----------------------------------------  79M with a history of coronary artery disease, gastroesophageal reflux, pancreatic mass, and glaucoma who presented to [facility] with hematemesis and melena found to have anemia requiring transfusion of packed red blood cells. CT of the abdomen was notable for an enlarged heterogenous pancreatic head, common bile duct dilation, duodenal thickening, gastric distention, and liver hypodensities. The patient was transfered to [facility] for evaluation and underwent endoscopy with findings of a duodenal mass. Packed red blood cells were transfused for recurrent anemia and the patient underwent radiation therapy for tumor bleeding.    Acute blood loss anemia  - Endoscopy(11/1) noted old blood, ulcerated mucosa in the duodenal bulb, and duodenal mass  - Hemoglobin level improved with transfusions  - To continue on pantoprazole  - To avoid oral intake for now, on intravenous fluids  - Radiation therapy performed  - Discussed with Gastroenterology, to determine optimal time for duodenal stent placement next week to relieve obstruction    Thrombocytopenia  - Monitoring platelet counts  - Stable, slightly improved    Hypoxia  - Improved, titrating supplemental oxygen  - Afebrile and without leukocytosis  - Furosemide administered overnight  - Possible aspiration pneumonitis  - Continue close monitoring    Acute kidney injury  - Continue to monitor renal functions  - No urological symptoms or signs of obstruction  - On intravenous fluids    Pancreatic / Duodenal mass  - Prior MRI(10/29) noted few small poorly delineated hepatic lesions, likely metastases, 4.5 x 3.5 cm infiltrative pancreatic head mass invading the proximal duodenum, interruption of the intrapancreatic portion of the common bile and main pancreatic ducts at the level of the mass with only minimal upstream ductal dilatation  - CA 19-9 level was elevated  - Biopsy results noted adenocarcinoma, patient informed  - Oncology evaluation noted    Coronary artery disease / Hypertension  - Aspirin held due to bleeding and anemia  - On amlodipine  - Lisinopril and spironolactone previously held due to acute kidney injury    Glaucoma  - On timolol eye drops        Dispo: Anticipate eventual discharge home pending improvement in anemia and tolerance of oral intake

## 2024-11-08 NOTE — CHART NOTE - NSCHARTNOTEFT_GEN_A_CORE
Medicine PA-  Cd for pt that desated 84% on RA.  Pt denies CP and SOB, but does have pain with inspiration.  Admitted with duodenal/ pancreatic mass. Medicine PA-  Cd for pt that desated 84% on RA.  Pt denies CP and SOB, but does have pain with inspiration.  Admitted with duodenal/ pancreatic mass.  Pt hx AVR and use to take ? diuretic for edema legs, on none now.  NPO, on IVF D5NS @ 75ml and has had decreased urination.    Vital Signs Last 24 Hrs  T(C): 37.4 (08 Nov 2024 04:51), Max: 37.4 (08 Nov 2024 04:51)  T(F): 99.4 (08 Nov 2024 04:51), Max: 99.4 (08 Nov 2024 04:51)  HR: 81 (08 Nov 2024 04:51) (68 - 91)  BP: 111/57 (08 Nov 2024 04:51) (104/51 - 146/60)  BP(mean): --  RR: 17 (08 Nov 2024 05:30) (17 - 18)  SpO2: 96% (08 Nov 2024 05:30) (87% - 99%)    Parameters below as of 08 Nov 2024 05:30  Patient On (Oxygen Delivery Method): nasal cannula  O2 Flow (L/min): 2    PE:  Gen- Pt A+O x3, in NAD  S1S2 ausc  Lungs- clear b/l  Abd- soft, mild epigastric tenderness, no rebound or rigidity  Ext- 2+ edema b/l    CXR-+pulm congestion    >CHF  -lasix 20mg IVP  -MS 2mg IVP  -may need cardio consult  -continue to monitor

## 2024-11-08 NOTE — PROVIDER CONTACT NOTE (OTHER) - ASSESSMENT
VSS. C/o Upper quadrant abdominal pain
pt complaining of SOB, placed on 4L sating at 87%. auscultated BL lung sounds, crackles heard with expiratory wheeze.     Vitals- 98.3 HR 79 104/51 17 RR 87 O2 on 4L NC

## 2024-11-08 NOTE — PROGRESS NOTE ADULT - SUBJECTIVE AND OBJECTIVE BOX
Subjective: Patient was seen and examined at bedside. Patient had multiple episodes of nausea and vomiting throughout the day yesterday which resolved overnight       MEDICATIONS  (STANDING):  amLODIPine   Tablet 2.5 milliGRAM(s) Oral daily  carvedilol 6.25 milliGRAM(s) Oral every 12 hours  dextrose 5% + sodium chloride 0.9% with potassium chloride 20 mEq/L 1000 milliLiter(s) (75 mL/Hr) IV Continuous <Continuous>  glycerin Suppository - Adult 1 Suppository(s) Rectal at bedtime  pantoprazole Infusion 8 mG/Hr (10 mL/Hr) IV Continuous <Continuous>  rosuvastatin 10 milliGRAM(s) Oral at bedtime  senna 2 Tablet(s) Oral at bedtime  tamsulosin 0.4 milliGRAM(s) Oral at bedtime  timolol 0.5% Solution 1 Drop(s) Both EYES at bedtime    MEDICATIONS  (PRN):  acetaminophen     Tablet .. 650 milliGRAM(s) Oral every 6 hours PRN Temp greater or equal to 38C (100.4F), Mild Pain (1 - 3)  aluminum hydroxide/magnesium hydroxide/simethicone Suspension 30 milliLiter(s) Oral every 4 hours PRN Dyspepsia  melatonin 3 milliGRAM(s) Oral at bedtime PRN Insomnia  metoclopramide Injectable 5 milliGRAM(s) IV Push every 6 hours PRN nausea      Vital Signs Last 24 Hrs  T(C): 37.4 (08 Nov 2024 04:51), Max: 37.4 (08 Nov 2024 04:51)  T(F): 99.4 (08 Nov 2024 04:51), Max: 99.4 (08 Nov 2024 04:51)  HR: 81 (08 Nov 2024 04:51) (68 - 91)  BP: 111/57 (08 Nov 2024 04:51) (104/51 - 146/60)  BP(mean): --  RR: 17 (08 Nov 2024 05:30) (17 - 18)  SpO2: 96% (08 Nov 2024 05:30) (87% - 99%)    Parameters below as of 08 Nov 2024 05:30  Patient On (Oxygen Delivery Method): nasal cannula  O2 Flow (L/min): 2      Physical Exam:    Constitutional: NAD  HEENT: PERRL, EOMI  Neck: No JVD, FROM without pain  Respiratory: Respirations non-labored, no accessory muscle use  Gastrointestinal: epigastric/LLQ TTP, non-distended, soft         LABS:                        8.8    8.21  )-----------( 84       ( 08 Nov 2024 04:50 )             26.3     11-08    149[H]  |  109[H]  |  34.2[H]  ----------------------------<  147[H]  3.4[L]   |  26.0  |  1.48[H]    Ca    7.9[L]      08 Nov 2024 04:50        Urinalysis Basic - ( 08 Nov 2024 04:50 )    Color: x / Appearance: x / SG: x / pH: x  Gluc: 147 mg/dL / Ketone: x  / Bili: x / Urobili: x   Blood: x / Protein: x / Nitrite: x   Leuk Esterase: x / RBC: x / WBC x   Sq Epi: x / Non Sq Epi: x / Bacteria: x      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

## 2024-11-09 LAB
ANION GAP SERPL CALC-SCNC: 10 MMOL/L — SIGNIFICANT CHANGE UP (ref 5–17)
BUN SERPL-MCNC: 54.1 MG/DL — HIGH (ref 8–20)
CALCIUM SERPL-MCNC: 7.5 MG/DL — LOW (ref 8.4–10.5)
CHLORIDE SERPL-SCNC: 110 MMOL/L — HIGH (ref 96–108)
CO2 SERPL-SCNC: 21 MMOL/L — LOW (ref 22–29)
CREAT SERPL-MCNC: 1.69 MG/DL — HIGH (ref 0.5–1.3)
EGFR: 41 ML/MIN/1.73M2 — LOW
GLUCOSE SERPL-MCNC: 105 MG/DL — HIGH (ref 70–99)
HCT VFR BLD CALC: 19.8 % — CRITICAL LOW (ref 39–50)
HCT VFR BLD CALC: 25.3 % — LOW (ref 39–50)
HGB BLD-MCNC: 6.5 G/DL — CRITICAL LOW (ref 13–17)
HGB BLD-MCNC: 8.2 G/DL — LOW (ref 13–17)
MCHC RBC-ENTMCNC: 30.4 PG — SIGNIFICANT CHANGE UP (ref 27–34)
MCHC RBC-ENTMCNC: 31.3 PG — SIGNIFICANT CHANGE UP (ref 27–34)
MCHC RBC-ENTMCNC: 32.4 G/DL — SIGNIFICANT CHANGE UP (ref 32–36)
MCHC RBC-ENTMCNC: 32.8 G/DL — SIGNIFICANT CHANGE UP (ref 32–36)
MCV RBC AUTO: 93.7 FL — SIGNIFICANT CHANGE UP (ref 80–100)
MCV RBC AUTO: 95.2 FL — SIGNIFICANT CHANGE UP (ref 80–100)
PLATELET # BLD AUTO: 72 K/UL — LOW (ref 150–400)
PLATELET # BLD AUTO: 87 K/UL — LOW (ref 150–400)
POTASSIUM SERPL-MCNC: 4.2 MMOL/L — SIGNIFICANT CHANGE UP (ref 3.5–5.3)
POTASSIUM SERPL-SCNC: 4.2 MMOL/L — SIGNIFICANT CHANGE UP (ref 3.5–5.3)
PROCALCITONIN SERPL-MCNC: 2.26 NG/ML — HIGH (ref 0.02–0.1)
RBC # BLD: 2.08 M/UL — LOW (ref 4.2–5.8)
RBC # BLD: 2.7 M/UL — LOW (ref 4.2–5.8)
RBC # FLD: 15 % — HIGH (ref 10.3–14.5)
RBC # FLD: 15.8 % — HIGH (ref 10.3–14.5)
SODIUM SERPL-SCNC: 141 MMOL/L — SIGNIFICANT CHANGE UP (ref 135–145)
WBC # BLD: 10.02 K/UL — SIGNIFICANT CHANGE UP (ref 3.8–10.5)
WBC # BLD: 7.51 K/UL — SIGNIFICANT CHANGE UP (ref 3.8–10.5)
WBC # FLD AUTO: 10.02 K/UL — SIGNIFICANT CHANGE UP (ref 3.8–10.5)
WBC # FLD AUTO: 7.51 K/UL — SIGNIFICANT CHANGE UP (ref 3.8–10.5)

## 2024-11-09 PROCEDURE — 99232 SBSQ HOSP IP/OBS MODERATE 35: CPT

## 2024-11-09 RX ORDER — AMPICILLIN AND SULBACTAM 2; 1 G/1; G/1
INJECTION, POWDER, FOR SOLUTION INTRAMUSCULAR; INTRAVENOUS
Refills: 0 | Status: DISCONTINUED | OUTPATIENT
Start: 2024-11-09 | End: 2024-11-09

## 2024-11-09 RX ORDER — CARVEDILOL 25 MG/1
3.12 TABLET, FILM COATED ORAL EVERY 12 HOURS
Refills: 0 | Status: DISCONTINUED | OUTPATIENT
Start: 2024-11-09 | End: 2024-11-12

## 2024-11-09 RX ORDER — SODIUM CHLORIDE 9 MG/ML
500 INJECTION, SOLUTION INTRAMUSCULAR; INTRAVENOUS; SUBCUTANEOUS
Refills: 0 | Status: DISCONTINUED | OUTPATIENT
Start: 2024-11-09 | End: 2024-11-09

## 2024-11-09 RX ORDER — SODIUM CHLORIDE 9 MG/ML
500 INJECTION, SOLUTION INTRAMUSCULAR; INTRAVENOUS; SUBCUTANEOUS ONCE
Refills: 0 | Status: COMPLETED | OUTPATIENT
Start: 2024-11-09 | End: 2024-11-09

## 2024-11-09 RX ORDER — PIPERACILLIN AND TAZOBACTAM .5; 4 G/20ML; G/20ML
3.38 INJECTION, POWDER, LYOPHILIZED, FOR SOLUTION INTRAVENOUS EVERY 8 HOURS
Refills: 0 | Status: DISCONTINUED | OUTPATIENT
Start: 2024-11-09 | End: 2024-11-10

## 2024-11-09 RX ADMIN — PIPERACILLIN AND TAZOBACTAM 25 GRAM(S): .5; 4 INJECTION, POWDER, LYOPHILIZED, FOR SOLUTION INTRAVENOUS at 23:19

## 2024-11-09 RX ADMIN — Medication 1 DROP(S): at 21:51

## 2024-11-09 RX ADMIN — Medication 2 TABLET(S): at 21:50

## 2024-11-09 RX ADMIN — Medication 0.4 MILLIGRAM(S): at 21:50

## 2024-11-09 RX ADMIN — SODIUM CHLORIDE, SODIUM GLUCONATE, SODIUM ACETATE, POTASSIUM CHLORIDE AND MAGNESIUM CHLORIDE 75 MILLILITER(S): 30; 37; 368; 526; 502 INJECTION, SOLUTION INTRAVENOUS at 21:56

## 2024-11-09 RX ADMIN — Medication 10 MILLIGRAM(S): at 21:51

## 2024-11-09 RX ADMIN — Medication 5 MILLIGRAM(S): at 21:49

## 2024-11-09 RX ADMIN — SODIUM CHLORIDE 500 MILLILITER(S): 9 INJECTION, SOLUTION INTRAMUSCULAR; INTRAVENOUS; SUBCUTANEOUS at 08:32

## 2024-11-09 RX ADMIN — CARVEDILOL 3.12 MILLIGRAM(S): 25 TABLET, FILM COATED ORAL at 19:02

## 2024-11-09 RX ADMIN — PANTOPRAZOLE SODIUM 10 MG/HR: 40 TABLET, DELAYED RELEASE ORAL at 21:55

## 2024-11-09 RX ADMIN — PANTOPRAZOLE SODIUM 10 MG/HR: 40 TABLET, DELAYED RELEASE ORAL at 11:46

## 2024-11-09 NOTE — PROGRESS NOTE ADULT - SUBJECTIVE AND OBJECTIVE BOX
This is an 78 yo F who follows Dr Foster/JAEL for a history of VICTORIA S/P IV iron  last Hb in the office on 10/8 was 13.1 Has received IV iron in the past   presented to [facility] on 10/27 with few days of hematemesis and melena and was found to have anemia with Hgb of 7.4, s/p CT A/P at [facility] showing enlarged heterogenous pancreatic head, CBD dilation, duodenal thickening, gastric distention w/ liver hypodensities, all suspicious for neoplasm, being transferred to [facility] for advanced GI and surgical oncology eval    MEDICATIONS  (STANDING):  carvedilol 6.25 milliGRAM(s) Oral every 12 hours  pantoprazole Infusion 8 mG/Hr (10 mL/Hr) IV Continuous <Continuous>  rosuvastatin 10 milliGRAM(s) Oral at bedtime  timolol 0.5% Solution 1 Drop(s) Both EYES at bedtime    MEDICATIONS  (PRN):  acetaminophen     Tablet .. 650 milliGRAM(s) Oral every 6 hours PRN Temp greater or equal to 38C (100.4F), Mild Pain (1 - 3)  melatonin 3 milliGRAM(s) Oral at bedtime PRN Insomnia  ondansetron Injectable 4 milliGRAM(s) IV Push every 6 hours PRN Nausea and/or Vomiting    Vital Signs Last 24 Hrs  T(C): 36.7 (09 Nov 2024 08:15), Max: 36.8 (09 Nov 2024 05:01)  T(F): 98 (09 Nov 2024 08:15), Max: 98.3 (09 Nov 2024 05:01)  HR: 61 (09 Nov 2024 08:15) (58 - 72)  BP: 104/51 (09 Nov 2024 08:15) (92/48 - 111/66)  BP(mean): --  RR: 17 (09 Nov 2024 08:15) (17 - 18)  SpO2: 92% (09 Nov 2024 08:15) (92% - 99%)    Parameters below as of 09 Nov 2024 08:15  Patient On (Oxygen Delivery Method): room air      GENERAL:  Well-appearing elderly male, not in acute distress  EYES:  Clear conjunctiva, extraocular movement intact  ENT: Moist mucous membranes  RESP:   lungs clear to ausculation   CV: Regular rate and rhythm  GI: Soft, non-tender, non-distended  NEURO: Awake, alert, conversant   PSYCH: Calm, cooperative  SKIN: No rash or lesions, warm and dry    CBC                        6.5    7.51  )-----------( 72       ( 09 Nov 2024 04:35 )             19.8                           8.8    8.21  )-----------( 84       ( 08 Nov 2024 04:50 )             26.3                           8.5    9.03  )-----------( 76       ( 07 Nov 2024 05:45 )             26.0                           7.1    7.42  )-----------( 71       ( 06 Nov 2024 04:53 )             21.8                           7.1    7.93  )-----------( 78       ( 05 Nov 2024 08:15 )             21.8                             7.1    7.93  )-----------( 78       ( 05 Nov 2024 08:15 )             21.8                             6.6    7.34  )-----------( 82       ( 04 Nov 2024 04:47 )             20.3       CHEM    11-09    141  |  110[H]  |  54.1[H]  ----------------------------<  105[H]  4.2   |  21.0[L]  |  1.69[H]    Ca    7.5[L]      09 Nov 2024 04:35        11-08    149[H]  |  109[H]  |  34.2[H]  ----------------------------<  147[H]  3.4[L]   |  26.0  |  1.48[H]    Ca    7.9[L]      08 Nov 2024 04:50            11-05    140  |  111[H]  |  19.8  ----------------------------<  108[H]  4.0   |  21.0[L]  |  0.86    Ca    8.0[L]      05 Nov 2024 08:15        11-05    140  |  111[H]  |  19.8  ----------------------------<  108[H]  4.0   |  21.0[L]  |  0.86    Ca    8.0[L]      05 Nov 2024 08:15  Mg     1.8     11-04 11-04    138  |  110[H]  |  25.4[H]  ----------------------------<  96  4.8   |  20.0[L]  |  0.83    Ca    8.3[L]      04 Nov 2024 04:47  Mg     1.8     11-04    TPro  4.5[L]  /  Alb  3.1[L]  /  TBili  0.3[L]  /  DBili  x   /  AST  17  /  ALT  10  /  AlkPhos  51  11-03

## 2024-11-09 NOTE — PROVIDER CONTACT NOTE (CRITICAL VALUE NOTIFICATION) - ACTION/TREATMENT ORDERED:
Result reported to night medicine PA. Awaiting orders for transfusion. Will sign out to day shift RN
Blood transfusion,   Consent
repeat CBC
order pending

## 2024-11-09 NOTE — PROGRESS NOTE ADULT - ASSESSMENT
This is an 78 yo F who follows Dr Foster/JAEL for a history of VICTORIA S/P IV iron  last Hb in the office on 10/8 was 13.1 Has received IV iron in the past   presented to [facility] on 10/27 with few days of hematemesis and melena and was found to have anemia with Hgb of 7.4, s/p CT A/P at [facility] showing enlarged heterogenous pancreatic head, CBD dilation, duodenal thickening, gastric distention w/ liver hypodensities, all suspicious for neoplasm, being transferred to [facility] for advanced GI and surgical oncology eval    Acute blood loss anemia due to GI bleeding from duodenal ulcer  - follows Dr Foster/JAEL  - History of VICTORIA -S/P IV iron    - last Hb in the office on 10/8 was 13.1    - EGD on 10/28 at [facility] showed single ulcer at duodenal bulb  - Hgb 8.8  -  GI following- He has an obstruction on upper endoscopy that prohibited the passage of a 9 mm standard EGD scope.  -Surgery following- possible endoscopic stenting for palliation of duodenal obstruction  - ideally, once bleeding stabilizes and duodenal obstruction addressed with stent, would plan for systemic therapy as outpatient for metastatic duodenal/pancreatic cancer with liver metastases    Pancreatic mass  - CT A/P at [facility] showing enlarged heterogenous pancreatic head, CBD dilation, duodenal thickening, gastric distention w/ liver hypodensities, all suspicious for neoplasm,  - Ca 19-9 markedly elevated 4903K  - MRI abdomen completed at [facility] Technically limited study. Findings highly likely to represent metastatic pancreatic cancer, infiltrative primary pancreatic head neoplasm invading the duodenum and small presumed hepatic metastases.  -  GI following  - S/p EGD/ EUS, showed duodenal mass,- Pathology is positive for malignant cells + Adeno  - Evaluated by Rad onc-  Causing GI bleeding, requiring multiple transfusions.  Has received 6u PRBC thus far.    -S/P SIM 11/6   -S/P RT 11/7    Will follow    This is an 78 yo F who follows Dr Foster/JAEL for a history of VICTORIA S/P IV iron  last Hb in the office on 10/8 was 13.1 Has received IV iron in the past   presented to [facility] on 10/27 with few days of hematemesis and melena and was found to have anemia with Hgb of 7.4, s/p CT A/P at [facility] showing enlarged heterogenous pancreatic head, CBD dilation, duodenal thickening, gastric distention w/ liver hypodensities, all suspicious for neoplasm, being transferred to [facility] for advanced GI and surgical oncology eval    Acute blood loss anemia due to GI bleeding from duodenal ulcer  - follows Dr Foster/JAEL  - History of VICTORIA -S/P IV iron    - last Hb in the office on 10/8 was 13.1    - EGD on 10/28 at [facility] showed single ulcer at duodenal bulb  -  GI following- He has an obstruction on upper endoscopy that prohibited the passage of a 9 mm standard EGD scope.  -Surgery following- possible endoscopic stenting for palliation of duodenal obstruction  - ideally, once bleeding stabilizes and duodenal obstruction addressed with stent, would plan for systemic therapy as outpatient for metastatic duodenal/pancreatic cancer with liver metastases  - s/p 1 unit prbc for hgb of 6.5 reposnded to 8.1 patient feeling well oob no pain noted   NPO at this time pending possible procedure     Pancreatic mass  - CT A/P at [facility] showing enlarged heterogenous pancreatic head, CBD dilation, duodenal thickening, gastric distention w/ liver hypodensities, all suspicious for neoplasm,  - Ca 19-9 markedly elevated 4903K  - MRI abdomen completed at [facility] Technically limited study. Findings highly likely to represent metastatic pancreatic cancer, infiltrative primary pancreatic head neoplasm invading the duodenum and small presumed hepatic metastases.  -  GI following  - S/p EGD/ EUS, showed duodenal mass,- Pathology is positive for malignant cells + Adeno  - Evaluated by Rad onc-  Causing GI bleeding, requiring multiple transfusions.  Has received 6u PRBC thus far.    -S/P SIM 11/6   -S/P RT 11/7    Will follow

## 2024-11-09 NOTE — PROGRESS NOTE ADULT - ASSESSMENT
79M with a history of coronary artery disease, gastroesophageal reflux, pancreatic mass, and glaucoma who presented to [facility] with hematemesis and melena found to have anemia requiring transfusion of packed red blood cells. CT of the abdomen was notable for an enlarged heterogenous pancreatic head, common bile duct dilation, duodenal thickening, gastric distention, and liver hypodensities. The patient was transfered to [facility] for evaluation and underwent endoscopy with findings of a duodenal mass. Packed red blood cells were transfused for recurrent anemia and the patient underwent radiation therapy for tumor bleeding.    Acute blood loss anemia  - Endoscopy (11/1) noted old blood, ulcerated mucosa in the duodenal bulb, and duodenal mass  - Hgb level overnight 6.5, hemodynamically stable  - for pRBC 1U today  - continue protonix drip  - NPO for now  - s/p radiation therapy performed  - GI to determine optimal time for duodenal stent placement to relieve obstruction    Thrombocytopenia  - stable  - heme/onc following, f/u lab results  - Monitoring platelet counts    Hypoxia  - Improved, currently on RA  - Afebrile and without leukocytosis  - was given lasix IV on 11/7  - CXR without congestion but RLL infiltrate  - Possible aspiration pneumonitis, f/u procal  - Continue close monitoring    Acute kidney injury  - possibly 2/2 IV lasix  - hold diuretics for now  - No urological symptoms or signs of obstruction  - On intravenous fluids  - Continue to monitor renal functions    Pancreatic / Duodenal mass  - Prior MRI(10/29) noted few small poorly delineated hepatic lesions, likely metastases, 4.5 x 3.5 cm infiltrative pancreatic head mass invading the proximal duodenum, interruption of the intrapancreatic portion of the common bile and main pancreatic ducts at the level of the mass with only minimal upstream ductal dilatation  - CA 19-9 level was elevated  - s/p session of radiation  - Biopsy results noted adenocarcinoma, patient informed  - Oncology following. planning for outpatient treatment once stable for d/c    Coronary artery disease / Hypertension  - SBP has been borderline 100s  - d/c amlodipine  - c/w low dose coreg  - Aspirin held due to bleeding and anemia  - d/c amlodipine  - c/w lowest dose coreg  - Lisinopril and spironolactone on hold as well    Glaucoma  - On timolol eye drops      Dispo: currently active pending stabilization of hgb, PO status and GI procedure     79M with a history of coronary artery disease, gastroesophageal reflux, pancreatic mass, and glaucoma who presented to [facility] with hematemesis and melena found to have anemia requiring transfusion of packed red blood cells. CT of the abdomen was notable for an enlarged heterogenous pancreatic head, common bile duct dilation, duodenal thickening, gastric distention, and liver hypodensities. The patient was transfered to [facility] for evaluation and underwent endoscopy with findings of a duodenal mass. Packed red blood cells were transfused for recurrent anemia and the patient underwent radiation therapy for tumor bleeding.    Acute blood loss anemia  - Endoscopy (11/1) noted old blood, ulcerated mucosa in the duodenal bulb, and duodenal mass  - Hgb level overnight 6.5, hemodynamically stable  - for pRBC 1U today  - continue protonix drip  - NPO for now  - s/p radiation therapy performed  - GI to determine optimal time for duodenal stent placement to relieve obstruction    Thrombocytopenia  - stable  - heme/onc following, f/u lab results  - Monitoring platelet counts    Hypoxia  - Improved, currently on RA  - Afebrile and without leukocytosis  - was given lasix IV on 11/7  - CXR without congestion but RLL infiltrate  - Possible aspiration pneumonitis, f/u procal  - Continue close monitoring    Acute kidney injury  - possibly 2/2 IV lasix  - hold diuretics for now  - No urological symptoms or signs of obstruction  - check urine lytes  - On intravenous fluids  - Continue to monitor renal functions    Pancreatic / Duodenal mass  - Prior MRI(10/29) noted few small poorly delineated hepatic lesions, likely metastases, 4.5 x 3.5 cm infiltrative pancreatic head mass invading the proximal duodenum, interruption of the intrapancreatic portion of the common bile and main pancreatic ducts at the level of the mass with only minimal upstream ductal dilatation  - CA 19-9 level was elevated  - s/p session of radiation  - Biopsy results noted adenocarcinoma, patient informed  - Oncology following. planning for outpatient treatment once stable for d/c    Coronary artery disease / Hypertension  - SBP has been borderline 100s  - d/c amlodipine  - c/w low dose coreg  - Aspirin held due to bleeding and anemia  - d/c amlodipine  - c/w lowest dose coreg  - Lisinopril and spironolactone on hold as well    Glaucoma  - On timolol eye drops      Dispo: currently active pending stabilization of hgb, PO status and GI procedure     79M with a history of coronary artery disease, gastroesophageal reflux, pancreatic mass, and glaucoma who presented to [facility] with hematemesis and melena found to have anemia requiring transfusion of packed red blood cells. CT of the abdomen was notable for an enlarged heterogenous pancreatic head, common bile duct dilation, duodenal thickening, gastric distention, and liver hypodensities. The patient was transfered to [facility] for evaluation and underwent endoscopy with findings of a duodenal mass. Packed red blood cells were transfused for recurrent anemia and the patient underwent radiation therapy for tumor bleeding.    Acute blood loss anemia  - Endoscopy (11/1) noted old blood, ulcerated mucosa in the duodenal bulb, and duodenal mass  - Hgb level overnight 6.5, hemodynamically stable  - for pRBC 1U today  - continue protonix drip  - NPO for now  - s/p radiation therapy performed  - GI to determine optimal time for duodenal stent placement to relieve obstruction    Thrombocytopenia  - stable  - heme/onc following, f/u lab results  - Monitoring platelet counts    Hypoxia  - Improved, currently on RA  - Afebrile but WBC increased as of yesterday  - was given lasix IV on 11/7  - CXR without congestion but RLL infiltrate  - procal elevated, start zosyn  - Continue close monitoring    Acute kidney injury  - possibly 2/2 IV lasix  - hold diuretics for now  - No urological symptoms or signs of obstruction  - check urine lytes  - On intravenous fluids  - Continue to monitor renal functions    Pancreatic / Duodenal mass  - Prior MRI(10/29) noted few small poorly delineated hepatic lesions, likely metastases, 4.5 x 3.5 cm infiltrative pancreatic head mass invading the proximal duodenum, interruption of the intrapancreatic portion of the common bile and main pancreatic ducts at the level of the mass with only minimal upstream ductal dilatation  - CA 19-9 level elevated  - s/p radiation  - Biopsy results noted adenocarcinoma, pt aware  - Oncology following. planning for outpatient treatment once stable for d/c    Coronary artery disease / Hypertension  - SBP has been borderline 100s  - d/c amlodipine  - c/w low dose coreg  - Aspirin held due to bleeding and anemia  - d/c amlodipine  - c/w lowest dose coreg  - Lisinopril and spironolactone on hold as well    Glaucoma  - On timolol eye drops      Dispo: currently active pending stabilization of hgb, PO status and GI procedure

## 2024-11-09 NOTE — PROGRESS NOTE ADULT - SUBJECTIVE AND OBJECTIVE BOX
[facility] Division of Medicine    SUBJECTIVE / OVERNIGHT EVENTS: No overnight events as per RN. Pt seen at the bedside. Endorses no nausea today since stopping clear liquid diet. Complains of bilateral foot swelling and cough. Denies any fever or chills. Cant bring up sputum though. All other systems reviewed and are negative.    MEDICATIONS  (STANDING):  bisacodyl Suppository 10 milliGRAM(s) Rectal once  carvedilol 6.25 milliGRAM(s) Oral every 12 hours  dextrose 5% + sodium chloride 0.9% with potassium chloride 20 mEq/L 1000 milliLiter(s) (75 mL/Hr) IV Continuous <Continuous>  glycerin Suppository - Adult 1 Suppository(s) Rectal at bedtime  pantoprazole Infusion 8 mG/Hr (10 mL/Hr) IV Continuous <Continuous>  rosuvastatin 10 milliGRAM(s) Oral at bedtime  senna 2 Tablet(s) Oral at bedtime  tamsulosin 0.4 milliGRAM(s) Oral at bedtime  timolol 0.5% Solution 1 Drop(s) Both EYES at bedtime    MEDICATIONS  (PRN):  acetaminophen     Tablet .. 650 milliGRAM(s) Oral every 6 hours PRN Temp greater or equal to 38C (100.4F), Mild Pain (1 - 3)  aluminum hydroxide/magnesium hydroxide/simethicone Suspension 30 milliLiter(s) Oral every 4 hours PRN Dyspepsia  melatonin 3 milliGRAM(s) Oral at bedtime PRN Insomnia  metoclopramide Injectable 5 milliGRAM(s) IV Push every 6 hours PRN nausea      I&O's Summary    08 Nov 2024 07:01  -  09 Nov 2024 07:00  --------------------------------------------------------  IN: 1020 mL / OUT: 0 mL / NET: 1020 mL        acetaminophen     Tablet .. 650 milliGRAM(s) Oral every 6 hours PRN  aluminum hydroxide/magnesium hydroxide/simethicone Suspension 30 milliLiter(s) Oral every 4 hours PRN  bisacodyl Suppository 10 milliGRAM(s) Rectal once  carvedilol 6.25 milliGRAM(s) Oral every 12 hours  dextrose 5% + sodium chloride 0.9% with potassium chloride 20 mEq/L 1000 milliLiter(s) IV Continuous <Continuous>  glycerin Suppository - Adult 1 Suppository(s) Rectal at bedtime  melatonin 3 milliGRAM(s) Oral at bedtime PRN  metoclopramide Injectable 5 milliGRAM(s) IV Push every 6 hours PRN  pantoprazole Infusion 8 mG/Hr IV Continuous <Continuous>  rosuvastatin 10 milliGRAM(s) Oral at bedtime  senna 2 Tablet(s) Oral at bedtime  tamsulosin 0.4 milliGRAM(s) Oral at bedtime  timolol 0.5% Solution 1 Drop(s) Both EYES at bedtime      PHYSICAL EXAM:  Vital Signs Last 24 Hrs  T(C): 36.7 (09 Nov 2024 08:15), Max: 36.8 (09 Nov 2024 05:01)  T(F): 98 (09 Nov 2024 08:15), Max: 98.3 (09 Nov 2024 05:01)  HR: 61 (09 Nov 2024 08:15) (58 - 72)  BP: 104/51 (09 Nov 2024 08:15) (92/48 - 111/66)  BP(mean): --  RR: 17 (09 Nov 2024 08:15) (17 - 18)  SpO2: 92% (09 Nov 2024 08:15) (92% - 99%)    Parameters below as of 09 Nov 2024 08:15  Patient On (Oxygen Delivery Method): room air          CONSTITUTIONAL: no apparent distress  RESP: No respiratory distress, mild crackles bilaterally, no wheezes  CV: RRR, +S1S2, no peripheral edema  GI: Soft, NT, ND  PSYCH: A+O x 3, mood and affect appropriate  NEURO: Cooperative, upper and lower motor function grossly intact bilaterally, sensation grossly intact throughout      LABS:                        6.5    7.51  )-----------( 72       ( 09 Nov 2024 04:35 )             19.8     11-09    141  |  110[H]  |  54.1[H]  ----------------------------<  105[H]  4.2   |  21.0[L]  |  1.69[H]    Ca    7.5[L]      09 Nov 2024 04:35            Urinalysis Basic - ( 09 Nov 2024 04:35 )    Color: x / Appearance: x / SG: x / pH: x  Gluc: 105 mg/dL / Ketone: x  / Bili: x / Urobili: x   Blood: x / Protein: x / Nitrite: x   Leuk Esterase: x / RBC: x / WBC x   Sq Epi: x / Non Sq Epi: x / Bacteria: x        CAPILLARY BLOOD GLUCOSE          IMAGING:

## 2024-11-09 NOTE — PROGRESS NOTE ADULT - SUBJECTIVE AND OBJECTIVE BOX
Subjective: Patient seen and examined at bedside, no overnight events      MEDICATIONS  (STANDING):  amLODIPine   Tablet 2.5 milliGRAM(s) Oral daily  carvedilol 6.25 milliGRAM(s) Oral every 12 hours  dextrose 5% + sodium chloride 0.9% with potassium chloride 20 mEq/L 1000 milliLiter(s) (75 mL/Hr) IV Continuous <Continuous>  glycerin Suppository - Adult 1 Suppository(s) Rectal at bedtime  pantoprazole Infusion 8 mG/Hr (10 mL/Hr) IV Continuous <Continuous>  rosuvastatin 10 milliGRAM(s) Oral at bedtime  senna 2 Tablet(s) Oral at bedtime  tamsulosin 0.4 milliGRAM(s) Oral at bedtime  timolol 0.5% Solution 1 Drop(s) Both EYES at bedtime    MEDICATIONS  (PRN):  acetaminophen     Tablet .. 650 milliGRAM(s) Oral every 6 hours PRN Temp greater or equal to 38C (100.4F), Mild Pain (1 - 3)  aluminum hydroxide/magnesium hydroxide/simethicone Suspension 30 milliLiter(s) Oral every 4 hours PRN Dyspepsia  melatonin 3 milliGRAM(s) Oral at bedtime PRN Insomnia  metoclopramide Injectable 5 milliGRAM(s) IV Push every 6 hours PRN nausea      Vital Signs Last 24 Hrs  T(C): 36.5 (09 Nov 2024 00:44), Max: 37.4 (08 Nov 2024 04:51)  T(F): 97.7 (09 Nov 2024 00:44), Max: 99.4 (08 Nov 2024 04:51)  HR: 58 (09 Nov 2024 00:44) (58 - 81)  BP: 99/60 (09 Nov 2024 00:44) (99/60 - 111/66)  BP(mean): --  RR: 18 (09 Nov 2024 00:44) (17 - 18)  SpO2: 96% (09 Nov 2024 00:44) (94% - 99%)    Parameters below as of 09 Nov 2024 00:44  Patient On (Oxygen Delivery Method): room air        Physical Exam:    Constitutional: NAD  HEENT: EOMI  Respiratory: Respirations non-labored, no accessory muscle use  Gastrointestinal: soft, TTP, non-distended  Neurological: A&O x 3      LABS:                        8.8    8.21  )-----------( 84       ( 08 Nov 2024 04:50 )             26.3     11-08    149[H]  |  109[H]  |  34.2[H]  ----------------------------<  147[H]  3.4[L]   |  26.0  |  1.48[H]    Ca    7.9[L]      08 Nov 2024 04:50        Urinalysis Basic - ( 08 Nov 2024 04:50 )    Color: x / Appearance: x / SG: x / pH: x  Gluc: 147 mg/dL / Ketone: x  / Bili: x / Urobili: x   Blood: x / Protein: x / Nitrite: x   Leuk Esterase: x / RBC: x / WBC x   Sq Epi: x / Non Sq Epi: x / Bacteria: x        A: 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

## 2024-11-10 LAB
ALBUMIN SERPL ELPH-MCNC: 2.4 G/DL — LOW (ref 3.3–5.2)
ALP SERPL-CCNC: 48 U/L — SIGNIFICANT CHANGE UP (ref 40–120)
ALT FLD-CCNC: 10 U/L — SIGNIFICANT CHANGE UP
ANION GAP SERPL CALC-SCNC: 10 MMOL/L — SIGNIFICANT CHANGE UP (ref 5–17)
AST SERPL-CCNC: 19 U/L — SIGNIFICANT CHANGE UP
BASOPHILS # BLD AUTO: 0.03 K/UL — SIGNIFICANT CHANGE UP (ref 0–0.2)
BASOPHILS NFR BLD AUTO: 0.3 % — SIGNIFICANT CHANGE UP (ref 0–2)
BILIRUB SERPL-MCNC: 0.6 MG/DL — SIGNIFICANT CHANGE UP (ref 0.4–2)
BUN SERPL-MCNC: 33.2 MG/DL — HIGH (ref 8–20)
CALCIUM SERPL-MCNC: 8 MG/DL — LOW (ref 8.4–10.5)
CHLORIDE SERPL-SCNC: 113 MMOL/L — HIGH (ref 96–108)
CO2 SERPL-SCNC: 17 MMOL/L — LOW (ref 22–29)
CREAT SERPL-MCNC: 1.17 MG/DL — SIGNIFICANT CHANGE UP (ref 0.5–1.3)
EGFR: 63 ML/MIN/1.73M2 — SIGNIFICANT CHANGE UP
EOSINOPHIL # BLD AUTO: 0.19 K/UL — SIGNIFICANT CHANGE UP (ref 0–0.5)
EOSINOPHIL NFR BLD AUTO: 1.9 % — SIGNIFICANT CHANGE UP (ref 0–6)
GLUCOSE SERPL-MCNC: 88 MG/DL — SIGNIFICANT CHANGE UP (ref 70–99)
HCT VFR BLD CALC: 23.8 % — LOW (ref 39–50)
HGB BLD-MCNC: 7.8 G/DL — LOW (ref 13–17)
IMM GRANULOCYTES NFR BLD AUTO: 0.5 % — SIGNIFICANT CHANGE UP (ref 0–0.9)
LYMPHOCYTES # BLD AUTO: 0.38 K/UL — LOW (ref 1–3.3)
LYMPHOCYTES # BLD AUTO: 3.8 % — LOW (ref 13–44)
MCHC RBC-ENTMCNC: 30.6 PG — SIGNIFICANT CHANGE UP (ref 27–34)
MCHC RBC-ENTMCNC: 32.8 G/DL — SIGNIFICANT CHANGE UP (ref 32–36)
MCV RBC AUTO: 93.3 FL — SIGNIFICANT CHANGE UP (ref 80–100)
MONOCYTES # BLD AUTO: 0.52 K/UL — SIGNIFICANT CHANGE UP (ref 0–0.9)
MONOCYTES NFR BLD AUTO: 5.3 % — SIGNIFICANT CHANGE UP (ref 2–14)
NEUTROPHILS # BLD AUTO: 8.71 K/UL — HIGH (ref 1.8–7.4)
NEUTROPHILS NFR BLD AUTO: 88.2 % — HIGH (ref 43–77)
PLATELET # BLD AUTO: 82 K/UL — LOW (ref 150–400)
POTASSIUM SERPL-MCNC: 4 MMOL/L — SIGNIFICANT CHANGE UP (ref 3.5–5.3)
POTASSIUM SERPL-SCNC: 4 MMOL/L — SIGNIFICANT CHANGE UP (ref 3.5–5.3)
PROT SERPL-MCNC: 4.1 G/DL — LOW (ref 6.6–8.7)
RBC # BLD: 2.55 M/UL — LOW (ref 4.2–5.8)
RBC # FLD: 15.6 % — HIGH (ref 10.3–14.5)
SODIUM SERPL-SCNC: 140 MMOL/L — SIGNIFICANT CHANGE UP (ref 135–145)
WBC # BLD: 9.88 K/UL — SIGNIFICANT CHANGE UP (ref 3.8–10.5)
WBC # FLD AUTO: 9.88 K/UL — SIGNIFICANT CHANGE UP (ref 3.8–10.5)

## 2024-11-10 PROCEDURE — 99232 SBSQ HOSP IP/OBS MODERATE 35: CPT

## 2024-11-10 RX ORDER — CEFTRIAXONE SODIUM 10 G
1000 VIAL (EA) INJECTION EVERY 24 HOURS
Refills: 0 | Status: DISCONTINUED | OUTPATIENT
Start: 2024-11-10 | End: 2024-11-14

## 2024-11-10 RX ADMIN — PIPERACILLIN AND TAZOBACTAM 25 GRAM(S): .5; 4 INJECTION, POWDER, LYOPHILIZED, FOR SOLUTION INTRAVENOUS at 05:58

## 2024-11-10 RX ADMIN — Medication 0.4 MILLIGRAM(S): at 22:27

## 2024-11-10 RX ADMIN — Medication 10 MILLIGRAM(S): at 22:28

## 2024-11-10 RX ADMIN — PANTOPRAZOLE SODIUM 10 MG/HR: 40 TABLET, DELAYED RELEASE ORAL at 09:33

## 2024-11-10 RX ADMIN — Medication 2 TABLET(S): at 22:29

## 2024-11-10 RX ADMIN — Medication 1 DROP(S): at 22:29

## 2024-11-10 RX ADMIN — Medication 650 MILLIGRAM(S): at 22:27

## 2024-11-10 RX ADMIN — Medication 30 MILLILITER(S): at 22:29

## 2024-11-10 RX ADMIN — Medication 30 MILLILITER(S): at 18:15

## 2024-11-10 RX ADMIN — CARVEDILOL 3.12 MILLIGRAM(S): 25 TABLET, FILM COATED ORAL at 18:15

## 2024-11-10 RX ADMIN — CARVEDILOL 3.12 MILLIGRAM(S): 25 TABLET, FILM COATED ORAL at 05:57

## 2024-11-10 RX ADMIN — Medication 3 MILLIGRAM(S): at 22:28

## 2024-11-10 RX ADMIN — PANTOPRAZOLE SODIUM 10 MG/HR: 40 TABLET, DELAYED RELEASE ORAL at 22:30

## 2024-11-10 NOTE — PROGRESS NOTE ADULT - ASSESSMENT
79M with a history of coronary artery disease, gastroesophageal reflux, pancreatic mass, and glaucoma who presented to [facility] with hematemesis and melena found to have anemia requiring transfusion of packed red blood cells. CT of the abdomen was notable for an enlarged heterogenous pancreatic head, common bile duct dilation, duodenal thickening, gastric distention, and liver hypodensities. The patient was transfered to [facility] for evaluation and underwent endoscopy with findings of a duodenal mass. Packed red blood cells were transfused for recurrent anemia and the patient underwent radiation therapy for tumor bleeding.    Acute blood loss anemia  - Endoscopy (11/1) noted old blood, ulcerated mucosa in the duodenal bulb, and duodenal mass  - s/p 1 additional pRBC yesterday (11/10)  - for pRBC 1U today, trend levels daily  - continue protonix drip  - pt wishes to try clear liquid diet again  - NPO after midnight for EGD and stent placement tomorrow     Thrombocytopenia  - stable  - heme/onc following, f/u lab results  - Monitoring platelet counts    Hypoxia  - Improved, currently on RA  - Afebrile, WBC wnl  - was given lasix IV on 11/7, hold further diuresis 2/2 CJ  - CXR without congestion but RLL infiltrate  - procal elevated, c/w abx    Acute kidney injury  - possibly 2/2 IV lasix  - hold diuretics for now  - No urological symptoms or signs of obstruction  - check urine lytes  - On intravenous fluids  - Continue to monitor renal functions    Pancreatic / Duodenal mass  - Prior MRI(10/29) noted few small poorly delineated hepatic lesions, likely metastases, 4.5 x 3.5 cm infiltrative pancreatic head mass invading the proximal duodenum, interruption of the intrapancreatic portion of the common bile and main pancreatic ducts at the level of the mass with only minimal upstream ductal dilatation  - CA 19-9 level elevated  - s/p radiation  - Biopsy results noted adenocarcinoma, pt aware  - Oncology following. planning for outpatient treatment once stable for d/c    Coronary artery disease / Hypertension  - SBP has been borderline 100s  - d/c amlodipine  - c/w low dose coreg  - Aspirin on hold due to bleeding and anemia  - d/c amlodipine  - c/w lowest dose coreg  - Lisinopril and spironolactone on hold as well    Glaucoma  - On timolol eye drops      Dispo: currently active pending stabilization of hgb, improvement in PO status and GI procedure

## 2024-11-10 NOTE — PROGRESS NOTE ADULT - ASSESSMENT
A: 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, with ongoing tumor bleeding and developing duodenal obstruction, anemic    PLAN  - follow up CBC, F/U GI for possible endoscopic stenting for palliation of duodenal obstruction  - Once stable acutely, bleeding controlled, obstruction addressed with stent, systemic therapy as outpatient for metastatic duodenal/pancreatic cancer with liver metastases  - if obstructs after duodenal stenting can consider operative GJ   - F/U radiation oncology for radiation to help control tumor bleeding   - Remainder of care per primary team

## 2024-11-10 NOTE — PROGRESS NOTE ADULT - ASSESSMENT
79M with a history of coronary artery disease, gastroesophageal reflux, pancreatic mass, and glaucoma admitted with anemia, found to have an enlarged heterogenous pancreatic head, common bile duct dilation, duodenal thickening, gastric distention, and liver hypodensity on imaging.     Pancreatic adenocarcinoma  Duodenal mass    MRI (10/29) noted few small poorly delineated hepatic lesions, likely metastases, 4.5 x 3.5 cm infiltrative pancreatic head mass invading the proximal duodenum, interruption of the intrapancreatic portion of the common bile and main pancreatic ducts at the level of the mass with only minimal upstream ductal dilatation  Elevated CA 19-9  S/p EGD/ EUS, showed duodenal mass, s/p biopsy. Pathology is positive for malignant cells + Adenocarcinoma.   Seen by oncology, snow s/p radiation x sessions  Will plan for EGD with duodenal stet placement tomorrow  Keep NPO  Active type and screen, INR <1.5, Plt count >50K for procedure  Trend CBC, CMP. Replete electrolytes as needed.   Plan d/w the patient

## 2024-11-10 NOTE — PROGRESS NOTE ADULT - SUBJECTIVE AND OBJECTIVE BOX
[facility] Division of Medicine    SUBJECTIVE / OVERNIGHT EVENTS: No overnight events as per RN. Pt seen at the bedside. Denies any new complaints. All other systems reviewed and are negative.    MEDICATIONS  (STANDING):  bisacodyl Suppository 10 milliGRAM(s) Rectal once  carvedilol 3.125 milliGRAM(s) Oral every 12 hours  cefTRIAXone Injectable. 1000 milliGRAM(s) IV Push every 24 hours  glycerin Suppository - Adult 1 Suppository(s) Rectal at bedtime  pantoprazole Infusion 8 mG/Hr (10 mL/Hr) IV Continuous <Continuous>  rosuvastatin 10 milliGRAM(s) Oral at bedtime  senna 2 Tablet(s) Oral at bedtime  tamsulosin 0.4 milliGRAM(s) Oral at bedtime  timolol 0.5% Solution 1 Drop(s) Both EYES at bedtime    MEDICATIONS  (PRN):  acetaminophen     Tablet .. 650 milliGRAM(s) Oral every 6 hours PRN Temp greater or equal to 38C (100.4F), Mild Pain (1 - 3)  aluminum hydroxide/magnesium hydroxide/simethicone Suspension 30 milliLiter(s) Oral every 4 hours PRN Dyspepsia  melatonin 3 milliGRAM(s) Oral at bedtime PRN Insomnia  metoclopramide Injectable 5 milliGRAM(s) IV Push every 6 hours PRN nausea      I&O's Summary    09 Nov 2024 07:01  -  10 Nov 2024 07:00  --------------------------------------------------------  IN: 1235 mL / OUT: 500 mL / NET: 735 mL        acetaminophen     Tablet .. 650 milliGRAM(s) Oral every 6 hours PRN  aluminum hydroxide/magnesium hydroxide/simethicone Suspension 30 milliLiter(s) Oral every 4 hours PRN  bisacodyl Suppository 10 milliGRAM(s) Rectal once  carvedilol 3.125 milliGRAM(s) Oral every 12 hours  cefTRIAXone Injectable. 1000 milliGRAM(s) IV Push every 24 hours  glycerin Suppository - Adult 1 Suppository(s) Rectal at bedtime  melatonin 3 milliGRAM(s) Oral at bedtime PRN  metoclopramide Injectable 5 milliGRAM(s) IV Push every 6 hours PRN  pantoprazole Infusion 8 mG/Hr IV Continuous <Continuous>  rosuvastatin 10 milliGRAM(s) Oral at bedtime  senna 2 Tablet(s) Oral at bedtime  tamsulosin 0.4 milliGRAM(s) Oral at bedtime  timolol 0.5% Solution 1 Drop(s) Both EYES at bedtime      PHYSICAL EXAM:  Vital Signs Last 24 Hrs  T(C): 36.8 (10 Nov 2024 09:30), Max: 36.9 (10 Nov 2024 05:15)  T(F): 98.3 (10 Nov 2024 09:30), Max: 98.4 (10 Nov 2024 05:15)  HR: 73 (10 Nov 2024 05:15) (59 - 73)  BP: 150/54 (10 Nov 2024 09:30) (117/61 - 150/54)  BP(mean): --  RR: 18 (10 Nov 2024 09:30) (17 - 18)  SpO2: 92% (10 Nov 2024 09:30) (92% - 99%)    Parameters below as of 10 Nov 2024 09:30  Patient On (Oxygen Delivery Method): room air          CONSTITUTIONAL: no apparent distress  EYES: PERRLA  RESP: No respiratory distress, mild bibasilar rales  CV: RRR, +S1S2, +1 peripheral edema  GI: Soft, NT, ND  PSYCH: A+O x 3, mood and affect appropriate  NEURO: Cooperative, upper and lower motor function grossly intact bilaterally, sensation grossly intact throughout      LABS:                        7.8    9.88  )-----------( 82       ( 10 Nov 2024 06:50 )             23.8     11-10    140  |  113[H]  |  33.2[H]  ----------------------------<  88  4.0   |  17.0[L]  |  1.17    Ca    8.0[L]      10 Nov 2024 06:50    TPro  4.1[L]  /  Alb  2.4[L]  /  TBili  0.6  /  DBili  x   /  AST  19  /  ALT  10  /  AlkPhos  48  11-10          Urinalysis Basic - ( 10 Nov 2024 06:50 )    Color: x / Appearance: x / SG: x / pH: x  Gluc: 88 mg/dL / Ketone: x  / Bili: x / Urobili: x   Blood: x / Protein: x / Nitrite: x   Leuk Esterase: x / RBC: x / WBC x   Sq Epi: x / Non Sq Epi: x / Bacteria: x        CAPILLARY BLOOD GLUCOSE          IMAGING:                                  c

## 2024-11-10 NOTE — PROGRESS NOTE ADULT - SUBJECTIVE AND OBJECTIVE BOX
Chief Complaint: This is a 79y old man patient being seen in follow-up consultation for GI bleeding, duodenal mass, pancreatic neoplasm     Interval HPI / 24H events:  Patient seen and examined this morning. Reports "not happy". He is feeling hungry and "didn't eat anything for 2 weeks. Denies nausea vomiting, abdominal pain. Hematology following, s/p radiation x 2 sessions. Hemoglobin 7.8 gm this morning.     Review of Systems:  . Constitutional: No fever, chills  . HEENT: Negative  · Respiratory and Thorax: No shortness of breath, no cough  · Cardiovascular: No chest pain, palpitation, no dizziness  · Gastrointestinal: see above  · Genitourinary: No hematuria  · Musculoskeletal: Negative  · Neurological: negative  · Psychiatric: no agitation, no anxiety    PAST MEDICAL/SURGICAL HISTORY:  CAD (coronary artery disease)    Hypertension    Glaucoma    Pancreatic mass    Duodenal ulcer    GERD (gastroesophageal reflux disease)    S/P inguinal hernia repair    H/O aortic valve replacement      MEDICATIONS  (STANDING):  bisacodyl Suppository 10 milliGRAM(s) Rectal once  carvedilol 3.125 milliGRAM(s) Oral every 12 hours  dextrose 5% + sodium chloride 0.9% with potassium chloride 20 mEq/L 1000 milliLiter(s) (75 mL/Hr) IV Continuous <Continuous>  glycerin Suppository - Adult 1 Suppository(s) Rectal at bedtime  pantoprazole Infusion 8 mG/Hr (10 mL/Hr) IV Continuous <Continuous>  piperacillin/tazobactam IVPB.. 3.375 Gram(s) IV Intermittent every 8 hours  rosuvastatin 10 milliGRAM(s) Oral at bedtime  senna 2 Tablet(s) Oral at bedtime  tamsulosin 0.4 milliGRAM(s) Oral at bedtime  timolol 0.5% Solution 1 Drop(s) Both EYES at bedtime    MEDICATIONS  (PRN):  acetaminophen     Tablet .. 650 milliGRAM(s) Oral every 6 hours PRN Temp greater or equal to 38C (100.4F), Mild Pain (1 - 3)  aluminum hydroxide/magnesium hydroxide/simethicone Suspension 30 milliLiter(s) Oral every 4 hours PRN Dyspepsia  melatonin 3 milliGRAM(s) Oral at bedtime PRN Insomnia  metoclopramide Injectable 5 milliGRAM(s) IV Push every 6 hours PRN nausea    Gluten (Stomach Upset; Diarrhea)  No Known Drug Allergies  Lipitor (Other)    T(C): 36.8 (11-10-24 @ 09:30), Max: 36.9 (11-10-24 @ 05:15)  HR: 73 (11-10-24 @ 05:15) (59 - 73)  BP: 150/54 (11-10-24 @ 09:30) (117/61 - 150/54)  RR: 18 (11-10-24 @ 09:30) (17 - 18)  SpO2: 92% (11-10-24 @ 09:30) (92% - 99%)    I&O's Summary    09 Nov 2024 07:01  -  10 Nov 2024 07:00  --------------------------------------------------------  IN: 1235 mL / OUT: 500 mL / NET: 735 mL      PHYSICAL EXAM:    Constitutional: No acute distress  Neuro: Awake alert, oriented  HEENT: anicteric sclerae  CV: regular rate, regular rhythm  Pulm/chest: no accessory muscle use noted. On room air  Abd: soft, nontender, nondistended +bowel sounds. No rigidity, rebound tenderness, or guarding  Skin: warm, no jaundice   Psych: calm, cooperative      LABS:               7.8    9.88  )-----------( 82       ( 11-10 @ 06:50 )             23.8                8.2    10.02 )-----------( 87       ( 11-09 @ 20:50 )             25.3                6.5    7.51  )-----------( 72       ( 11-09 @ 04:35 )             19.8                8.8    8.21  )-----------( 84       ( 11-08 @ 04:50 )             26.3       11-10    140  |  113[H]  |  33.2[H]  ----------------------------<  88  4.0   |  17.0[L]  |  1.17    Ca    8.0[L]      10 Nov 2024 06:50    TPro  4.1[L]  /  Alb  2.4[L]  /  TBili  0.6  /  DBili  x   /  AST  19  /  ALT  10  /  AlkPhos  48  11-10    LIVER FUNCTIONS - ( 10 Nov 2024 06:50 )  Alb: 2.4 g/dL / Pro: 4.1 g/dL / ALK PHOS: 48 U/L / ALT: 10 U/L / AST: 19 U/L / GGT: x             < from: Xray Abdomen 1 View PORTABLE -Routine (Xray Abdomen 1 View PORTABLE -Routine .) (11.07.24 @ 19:50) >    FINDINGS:  Distended fluid-filled stomach.  Moderate stool burden throughout the colon.  A single loop of mid pelvis air-filled small bowel measuring 2.5 cm in   diameter.    No free intra-abdominal air.  No abnormal calcifications.  The osseous structures are intact.    IMPRESSION:  Distended Fluid-filled stomach. Moderate colonic stool burden.  Please review abdominal CT scan report for 2024    < end of copied text >

## 2024-11-10 NOTE — PROGRESS NOTE ADULT - SUBJECTIVE AND OBJECTIVE BOX
INTERVAL HPI/OVERNIGHT EVENTS:    Patient evaluated at bedside. No acute distress. No acute events overnight.    MEDICATIONS  (STANDING):  bisacodyl Suppository 10 milliGRAM(s) Rectal once  carvedilol 3.125 milliGRAM(s) Oral every 12 hours  dextrose 5% + sodium chloride 0.9% with potassium chloride 20 mEq/L 1000 milliLiter(s) (75 mL/Hr) IV Continuous <Continuous>  glycerin Suppository - Adult 1 Suppository(s) Rectal at bedtime  pantoprazole Infusion 8 mG/Hr (10 mL/Hr) IV Continuous <Continuous>  piperacillin/tazobactam IVPB.. 3.375 Gram(s) IV Intermittent every 8 hours  rosuvastatin 10 milliGRAM(s) Oral at bedtime  senna 2 Tablet(s) Oral at bedtime  tamsulosin 0.4 milliGRAM(s) Oral at bedtime  timolol 0.5% Solution 1 Drop(s) Both EYES at bedtime    MEDICATIONS  (PRN):  acetaminophen     Tablet .. 650 milliGRAM(s) Oral every 6 hours PRN Temp greater or equal to 38C (100.4F), Mild Pain (1 - 3)  aluminum hydroxide/magnesium hydroxide/simethicone Suspension 30 milliLiter(s) Oral every 4 hours PRN Dyspepsia  melatonin 3 milliGRAM(s) Oral at bedtime PRN Insomnia  metoclopramide Injectable 5 milliGRAM(s) IV Push every 6 hours PRN nausea      Vital Signs Last 24 Hrs  T(C): 36.6 (10 Nov 2024 00:00), Max: 36.8 (09 Nov 2024 05:01)  T(F): 97.8 (10 Nov 2024 00:00), Max: 98.3 (09 Nov 2024 05:01)  HR: 63 (10 Nov 2024 00:00) (59 - 65)  BP: 117/61 (10 Nov 2024 00:00) (92/48 - 125/52)  BP(mean): --  RR: 18 (10 Nov 2024 00:00) (17 - 18)  SpO2: 99% (10 Nov 2024 00:00) (92% - 99%)    Parameters below as of 10 Nov 2024 00:00  Patient On (Oxygen Delivery Method): room air        PHYSICAL EXAM:  GENERAL: NAD, well-groomed, well-developed  HEAD:  Atraumatic, Normocephalic  EYES: EOMI, PERRLA, conjunctiva and sclera clear  NECK: Supple, No JVD  CHEST/LUNG: non-labored breathing on room air.   HEART: Regular rate and rhythm  ABDOMEN: Soft, Nontender, Nondistended  VASCULAR: Normal pulses, Normal capillary refill  EXTREMITIES:  2+ Peripheral Pulses, No cyanosis, No edema  SKIN: Warm, Intact      I&O's Detail    08 Nov 2024 07:01  -  09 Nov 2024 07:00  --------------------------------------------------------  IN:    dextrose 5% + sodium chloride 0.9% w/ Additives: 900 mL    Pantoprazole: 120 mL  Total IN: 1020 mL    OUT:  Total OUT: 0 mL    Total NET: 1020 mL      09 Nov 2024 07:01  -  10 Nov 2024 01:55  --------------------------------------------------------  IN:    dextrose 5% + sodium chloride 0.9% w/ Additives: 825 mL    Pantoprazole: 110 mL    PRBCs (Packed Red Blood Cells): 300 mL  Total IN: 1235 mL    OUT:  Total OUT: 0 mL    Total NET: 1235 mL          LABS:                        8.2    10.02 )-----------( 87       ( 09 Nov 2024 20:50 )             25.3     11-09    141  |  110[H]  |  54.1[H]  ----------------------------<  105[H]  4.2   |  21.0[L]  |  1.69[H]    Ca    7.5[L]      09 Nov 2024 04:35        Urinalysis Basic - ( 09 Nov 2024 04:35 )    Color: x / Appearance: x / SG: x / pH: x  Gluc: 105 mg/dL / Ketone: x  / Bili: x / Urobili: x   Blood: x / Protein: x / Nitrite: x   Leuk Esterase: x / RBC: x / WBC x   Sq Epi: x / Non Sq Epi: x / Bacteria: x        RADIOLOGY & ADDITIONAL STUDIES:

## 2024-11-11 ENCOUNTER — TRANSCRIPTION ENCOUNTER (OUTPATIENT)
Age: 79
End: 2024-11-11

## 2024-11-11 LAB
ANION GAP SERPL CALC-SCNC: 10 MMOL/L — SIGNIFICANT CHANGE UP (ref 5–17)
APTT BLD: 35.2 SEC — SIGNIFICANT CHANGE UP (ref 24.5–35.6)
APTT BLD: 36.2 SEC — HIGH (ref 24.5–35.6)
BLD GP AB SCN SERPL QL: SIGNIFICANT CHANGE UP
BUN SERPL-MCNC: 23.6 MG/DL — HIGH (ref 8–20)
CALCIUM SERPL-MCNC: 7.7 MG/DL — LOW (ref 8.4–10.5)
CHLORIDE SERPL-SCNC: 111 MMOL/L — HIGH (ref 96–108)
CO2 SERPL-SCNC: 20 MMOL/L — LOW (ref 22–29)
CREAT SERPL-MCNC: 1.09 MG/DL — SIGNIFICANT CHANGE UP (ref 0.5–1.3)
EGFR: 69 ML/MIN/1.73M2 — SIGNIFICANT CHANGE UP
GLUCOSE SERPL-MCNC: 107 MG/DL — HIGH (ref 70–99)
HCT VFR BLD CALC: 22.5 % — LOW (ref 39–50)
HGB BLD-MCNC: 7.5 G/DL — LOW (ref 13–17)
INR BLD: 1.58 RATIO — HIGH (ref 0.85–1.16)
INR BLD: 2.08 RATIO — HIGH (ref 0.85–1.16)
MCHC RBC-ENTMCNC: 30.5 PG — SIGNIFICANT CHANGE UP (ref 27–34)
MCHC RBC-ENTMCNC: 33.3 G/DL — SIGNIFICANT CHANGE UP (ref 32–36)
MCV RBC AUTO: 91.5 FL — SIGNIFICANT CHANGE UP (ref 80–100)
PLATELET # BLD AUTO: 95 K/UL — LOW (ref 150–400)
POTASSIUM SERPL-MCNC: 3.8 MMOL/L — SIGNIFICANT CHANGE UP (ref 3.5–5.3)
POTASSIUM SERPL-SCNC: 3.8 MMOL/L — SIGNIFICANT CHANGE UP (ref 3.5–5.3)
PROTHROM AB SERPL-ACNC: 18.3 SEC — HIGH (ref 9.9–13.4)
PROTHROM AB SERPL-ACNC: 24 SEC — HIGH (ref 9.9–13.4)
RBC # BLD: 2.46 M/UL — LOW (ref 4.2–5.8)
RBC # FLD: 15 % — HIGH (ref 10.3–14.5)
SODIUM SERPL-SCNC: 141 MMOL/L — SIGNIFICANT CHANGE UP (ref 135–145)
WBC # BLD: 7.1 K/UL — SIGNIFICANT CHANGE UP (ref 3.8–10.5)
WBC # FLD AUTO: 7.1 K/UL — SIGNIFICANT CHANGE UP (ref 3.8–10.5)

## 2024-11-11 PROCEDURE — 99232 SBSQ HOSP IP/OBS MODERATE 35: CPT

## 2024-11-11 PROCEDURE — 76000 FLUOROSCOPY <1 HR PHYS/QHP: CPT | Mod: 26,XU

## 2024-11-11 PROCEDURE — 99233 SBSQ HOSP IP/OBS HIGH 50: CPT

## 2024-11-11 PROCEDURE — 43266 EGD ENDOSCOPIC STENT PLACE: CPT

## 2024-11-11 DEVICE — IMPLANTABLE DEVICE: Type: IMPLANTABLE DEVICE | Status: FUNCTIONAL

## 2024-11-11 DEVICE — HYDRA WIRE: Type: IMPLANTABLE DEVICE | Status: FUNCTIONAL

## 2024-11-11 DEVICE — BLLN EXTRCTR RX STONE 15-18: Type: IMPLANTABLE DEVICE | Status: FUNCTIONAL

## 2024-11-11 RX ORDER — METOPROLOL TARTRATE 50 MG
2.5 TABLET ORAL ONCE
Refills: 0 | Status: COMPLETED | OUTPATIENT
Start: 2024-11-11 | End: 2024-11-11

## 2024-11-11 RX ORDER — PHYTONADIONE 5 MG/1
5 TABLET ORAL ONCE
Refills: 0 | Status: COMPLETED | OUTPATIENT
Start: 2024-11-11 | End: 2024-11-11

## 2024-11-11 RX ORDER — ERYTHROPOIETIN 10000 [IU]/ML
40000 INJECTION, SOLUTION INTRAVENOUS; SUBCUTANEOUS ONCE
Refills: 0 | Status: DISCONTINUED | OUTPATIENT
Start: 2024-11-11 | End: 2024-11-11

## 2024-11-11 RX ORDER — ERYTHROPOIETIN 10000 [IU]/ML
40000 INJECTION, SOLUTION INTRAVENOUS; SUBCUTANEOUS ONCE
Refills: 0 | Status: COMPLETED | OUTPATIENT
Start: 2024-11-11 | End: 2024-11-11

## 2024-11-11 RX ORDER — SUCRALFATE 1 G/10ML
1 SUSPENSION ORAL EVERY 6 HOURS
Refills: 0 | Status: DISCONTINUED | OUTPATIENT
Start: 2024-11-11 | End: 2024-11-14

## 2024-11-11 RX ORDER — PANTOPRAZOLE SODIUM 40 MG/1
40 TABLET, DELAYED RELEASE ORAL
Refills: 0 | Status: DISCONTINUED | OUTPATIENT
Start: 2024-11-11 | End: 2024-11-14

## 2024-11-11 RX ORDER — SUCRALFATE 1 G/10ML
1 SUSPENSION ORAL
Refills: 0 | Status: DISCONTINUED | OUTPATIENT
Start: 2024-11-11 | End: 2024-11-14

## 2024-11-11 RX ADMIN — PHYTONADIONE 101 MILLIGRAM(S): 5 TABLET ORAL at 10:33

## 2024-11-11 RX ADMIN — Medication 2.5 MILLIGRAM(S): at 22:36

## 2024-11-11 RX ADMIN — Medication 1000 MILLIGRAM(S): at 10:33

## 2024-11-11 RX ADMIN — Medication 1 DROP(S): at 22:40

## 2024-11-11 RX ADMIN — SUCRALFATE 1 GRAM(S): 1 SUSPENSION ORAL at 22:40

## 2024-11-11 RX ADMIN — Medication 10 MILLIGRAM(S): at 22:40

## 2024-11-11 RX ADMIN — PANTOPRAZOLE SODIUM 40 MILLIGRAM(S): 40 TABLET, DELAYED RELEASE ORAL at 17:49

## 2024-11-11 RX ADMIN — ERYTHROPOIETIN 40000 UNIT(S): 10000 INJECTION, SOLUTION INTRAVENOUS; SUBCUTANEOUS at 12:38

## 2024-11-11 RX ADMIN — SUCRALFATE 1 GRAM(S): 1 SUSPENSION ORAL at 17:45

## 2024-11-11 RX ADMIN — CARVEDILOL 3.12 MILLIGRAM(S): 25 TABLET, FILM COATED ORAL at 05:03

## 2024-11-11 RX ADMIN — Medication 0.4 MILLIGRAM(S): at 22:40

## 2024-11-11 RX ADMIN — CARVEDILOL 3.12 MILLIGRAM(S): 25 TABLET, FILM COATED ORAL at 17:45

## 2024-11-11 RX ADMIN — PANTOPRAZOLE SODIUM 10 MG/HR: 40 TABLET, DELAYED RELEASE ORAL at 11:09

## 2024-11-11 NOTE — PROGRESS NOTE ADULT - SUBJECTIVE AND OBJECTIVE BOX
[facility] Division of Medicine    SUBJECTIVE / OVERNIGHT EVENTS: No overnight events as per RN. Pt seen at the bedside. Endorses frustration with inability to eat  x 2 weeks. Otherwise denies any new complaints. All other systems reviewed and are negative.    MEDICATIONS  (STANDING):  bisacodyl Suppository 10 milliGRAM(s) Rectal once  carvedilol 3.125 milliGRAM(s) Oral every 12 hours  cefTRIAXone Injectable. 1000 milliGRAM(s) IV Push every 24 hours  epoetin jyoti-epbx (RETACRIT) Injectable 06262 Unit(s) IV Push once  glycerin Suppository - Adult 1 Suppository(s) Rectal at bedtime  pantoprazole Infusion 8 mG/Hr (10 mL/Hr) IV Continuous <Continuous>  phytonadione  IVPB 5 milliGRAM(s) IV Intermittent once  rosuvastatin 10 milliGRAM(s) Oral at bedtime  senna 2 Tablet(s) Oral at bedtime  tamsulosin 0.4 milliGRAM(s) Oral at bedtime  timolol 0.5% Solution 1 Drop(s) Both EYES at bedtime    MEDICATIONS  (PRN):  acetaminophen     Tablet .. 650 milliGRAM(s) Oral every 6 hours PRN Temp greater or equal to 38C (100.4F), Mild Pain (1 - 3)  aluminum hydroxide/magnesium hydroxide/simethicone Suspension 30 milliLiter(s) Oral every 4 hours PRN Dyspepsia  melatonin 3 milliGRAM(s) Oral at bedtime PRN Insomnia  metoclopramide Injectable 5 milliGRAM(s) IV Push every 6 hours PRN nausea      I&O's Summary    10 Nov 2024 07:01  -  11 Nov 2024 07:00  --------------------------------------------------------  IN: 150 mL / OUT: 550 mL / NET: -400 mL        acetaminophen     Tablet .. 650 milliGRAM(s) Oral every 6 hours PRN  aluminum hydroxide/magnesium hydroxide/simethicone Suspension 30 milliLiter(s) Oral every 4 hours PRN  bisacodyl Suppository 10 milliGRAM(s) Rectal once  carvedilol 3.125 milliGRAM(s) Oral every 12 hours  cefTRIAXone Injectable. 1000 milliGRAM(s) IV Push every 24 hours  epoetin jyoti-epbx (RETACRIT) Injectable 88470 Unit(s) IV Push once  glycerin Suppository - Adult 1 Suppository(s) Rectal at bedtime  melatonin 3 milliGRAM(s) Oral at bedtime PRN  metoclopramide Injectable 5 milliGRAM(s) IV Push every 6 hours PRN  pantoprazole Infusion 8 mG/Hr IV Continuous <Continuous>  phytonadione  IVPB 5 milliGRAM(s) IV Intermittent once  rosuvastatin 10 milliGRAM(s) Oral at bedtime  senna 2 Tablet(s) Oral at bedtime  tamsulosin 0.4 milliGRAM(s) Oral at bedtime  timolol 0.5% Solution 1 Drop(s) Both EYES at bedtime      PHYSICAL EXAM:  Vital Signs Last 24 Hrs  T(C): 37.1 (11 Nov 2024 04:40), Max: 37.2 (10 Nov 2024 21:45)  T(F): 98.8 (11 Nov 2024 04:40), Max: 98.9 (10 Nov 2024 21:45)  HR: 71 (11 Nov 2024 04:40) (61 - 71)  BP: 126/54 (11 Nov 2024 04:40) (126/54 - 154/68)  BP(mean): --  RR: 18 (11 Nov 2024 04:40) (18 - 18)  SpO2: 92% (11 Nov 2024 04:40) (92% - 97%)    Parameters below as of 11 Nov 2024 04:40  Patient On (Oxygen Delivery Method): room air          CONSTITUTIONAL: no apparent distress  EYES: PERRLA  ENMT: Oral mucosa with moist membranes  RESP: No respiratory distress, clear to auscultation bilaterally, no wheezes or rales  CV: RRR, +S1S2, +1 peripheral edema  GI: Soft, NT, ND  PSYCH: A+O x 3, mood and affect appropriate  NEURO: Cooperative, upper and lower motor function grossly intact bilaterally, sensation grossly intact throughout      LABS:                        7.5    7.10  )-----------( 95       ( 11 Nov 2024 05:29 )             22.5     11-11    141  |  111[H]  |  23.6[H]  ----------------------------<  107[H]  3.8   |  20.0[L]  |  1.09    Ca    7.7[L]      11 Nov 2024 05:29    TPro  4.1[L]  /  Alb  2.4[L]  /  TBili  0.6  /  DBili  x   /  AST  19  /  ALT  10  /  AlkPhos  48  11-10    PT/INR - ( 11 Nov 2024 05:29 )   PT: 24.0 sec;   INR: 2.08 ratio         PTT - ( 11 Nov 2024 05:29 )  PTT:35.2 sec      Urinalysis Basic - ( 11 Nov 2024 05:29 )    Color: x / Appearance: x / SG: x / pH: x  Gluc: 107 mg/dL / Ketone: x  / Bili: x / Urobili: x   Blood: x / Protein: x / Nitrite: x   Leuk Esterase: x / RBC: x / WBC x   Sq Epi: x / Non Sq Epi: x / Bacteria: x        CAPILLARY BLOOD GLUCOSE          IMAGING:

## 2024-11-11 NOTE — PROGRESS NOTE ADULT - SUBJECTIVE AND OBJECTIVE BOX
This is an 80 yo F who follows Dr Foster/JAEL for a history of VICTORIA S/P IV iron  last Hb in the office on 10/8 was 13.1 Has received IV iron in the past   presented to [facility] on 10/27 with few days of hematemesis and melena and was found to have anemia with Hgb of 7.4, s/p CT A/P at [facility] showing enlarged heterogenous pancreatic head, CBD dilation, duodenal thickening, gastric distention w/ liver hypodensities, all suspicious for neoplasm, being transferred to [facility] for advanced GI and surgical oncology eval    MEDICATIONS  (STANDING):  carvedilol 6.25 milliGRAM(s) Oral every 12 hours  pantoprazole Infusion 8 mG/Hr (10 mL/Hr) IV Continuous <Continuous>  rosuvastatin 10 milliGRAM(s) Oral at bedtime  timolol 0.5% Solution 1 Drop(s) Both EYES at bedtime    MEDICATIONS  (PRN):  acetaminophen     Tablet .. 650 milliGRAM(s) Oral every 6 hours PRN Temp greater or equal to 38C (100.4F), Mild Pain (1 - 3)  melatonin 3 milliGRAM(s) Oral at bedtime PRN Insomnia  ondansetron Injectable 4 milliGRAM(s) IV Push every 6 hours PRN Nausea and/or Vomiting    Vital Signs Last 24 Hrs  T(C): 37.1 (11 Nov 2024 04:40), Max: 37.2 (10 Nov 2024 21:45)  T(F): 98.8 (11 Nov 2024 04:40), Max: 98.9 (10 Nov 2024 21:45)  HR: 71 (11 Nov 2024 04:40) (61 - 71)  BP: 126/54 (11 Nov 2024 04:40) (126/54 - 154/68)  BP(mean): --  RR: 18 (11 Nov 2024 04:40) (18 - 18)  SpO2: 92% (11 Nov 2024 04:40) (92% - 97%)    Parameters below as of 11 Nov 2024 04:40  Patient On (Oxygen Delivery Method): room air    GENERAL:  Well-appearing elderly male, not in acute distress  EYES:  Clear conjunctiva, extraocular movement intact  ENT: Moist mucous membranes  RESP:   lungs clear to ausculation   CV: Regular rate and rhythm  GI: Soft, non-tender, non-distended  NEURO: Awake, alert, conversant   PSYCH: Calm, cooperative  SKIN: No rash or lesions, warm and dry    CBC                          7.5    7.10  )-----------( 95       ( 11 Nov 2024 05:29 )             22.5                           6.5    7.51  )-----------( 72       ( 09 Nov 2024 04:35 )             19.8                           8.8    8.21  )-----------( 84       ( 08 Nov 2024 04:50 )             26.3                           8.5    9.03  )-----------( 76       ( 07 Nov 2024 05:45 )             26.0                           7.1    7.42  )-----------( 71       ( 06 Nov 2024 04:53 )             21.8                           7.1    7.93  )-----------( 78       ( 05 Nov 2024 08:15 )             21.8         CHEM  11-11    141  |  111[H]  |  23.6[H]  ----------------------------<  107[H]  3.8   |  20.0[L]  |  1.09    Ca    7.7[L]      11 Nov 2024 05:29    TPro  4.1[L]  /  Alb  2.4[L]  /  TBili  0.6  /  DBili  x   /  AST  19  /  ALT  10  /  AlkPhos  48  11-10      11-09    141  |  110[H]  |  54.1[H]  ----------------------------<  105[H]  4.2   |  21.0[L]  |  1.69[H]    Ca    7.5[L]      09 Nov 2024 04:35        11-08    149[H]  |  109[H]  |  34.2[H]  ----------------------------<  147[H]  3.4[L]   |  26.0  |  1.48[H]    Ca    7.9[L]      08 Nov 2024 04:50            11-05    140  |  111[H]  |  19.8  ----------------------------<  108[H]  4.0   |  21.0[L]  |  0.86    Ca    8.0[L]      05 Nov 2024 08:15        11-05    140  |  111[H]  |  19.8  ----------------------------<  108[H]  4.0   |  21.0[L]  |  0.86    Ca    8.0[L]      05 Nov 2024 08:15  Mg     1.8     11-04 11-04    138  |  110[H]  |  25.4[H]  ----------------------------<  96  4.8   |  20.0[L]  |  0.83    Ca    8.3[L]      04 Nov 2024 04:47  Mg     1.8     11-04    TPro  4.5[L]  /  Alb  3.1[L]  /  TBili  0.3[L]  /  DBili  x   /  AST  17  /  ALT  10  /  AlkPhos  51  11-03

## 2024-11-11 NOTE — PROGRESS NOTE ADULT - SUBJECTIVE AND OBJECTIVE BOX
Subjective: Patient seen at bedside, no overnight events.    MEDICATIONS  (STANDING):  bisacodyl Suppository 10 milliGRAM(s) Rectal once  carvedilol 3.125 milliGRAM(s) Oral every 12 hours  cefTRIAXone Injectable. 1000 milliGRAM(s) IV Push every 24 hours  glycerin Suppository - Adult 1 Suppository(s) Rectal at bedtime  pantoprazole Infusion 8 mG/Hr (10 mL/Hr) IV Continuous <Continuous>  rosuvastatin 10 milliGRAM(s) Oral at bedtime  senna 2 Tablet(s) Oral at bedtime  tamsulosin 0.4 milliGRAM(s) Oral at bedtime  timolol 0.5% Solution 1 Drop(s) Both EYES at bedtime    MEDICATIONS  (PRN):  acetaminophen     Tablet .. 650 milliGRAM(s) Oral every 6 hours PRN Temp greater or equal to 38C (100.4F), Mild Pain (1 - 3)  aluminum hydroxide/magnesium hydroxide/simethicone Suspension 30 milliLiter(s) Oral every 4 hours PRN Dyspepsia  melatonin 3 milliGRAM(s) Oral at bedtime PRN Insomnia  metoclopramide Injectable 5 milliGRAM(s) IV Push every 6 hours PRN nausea      Vital Signs Last 24 Hrs  T(C): 37.1 (11 Nov 2024 04:40), Max: 37.2 (10 Nov 2024 21:45)  T(F): 98.8 (11 Nov 2024 04:40), Max: 98.9 (10 Nov 2024 21:45)  HR: 71 (11 Nov 2024 04:40) (61 - 71)  BP: 126/54 (11 Nov 2024 04:40) (126/54 - 154/68)  BP(mean): --  RR: 18 (11 Nov 2024 04:40) (18 - 18)  SpO2: 92% (11 Nov 2024 04:40) (92% - 97%)    Parameters below as of 11 Nov 2024 04:40  Patient On (Oxygen Delivery Method): room air        Physical Exam:    Constitutional: NAD  Respiratory: Respirations non-labored, no accessory muscle use  Gastrointestinal: non-distended  Neurological: A&O x 3      LABS:                        7.5    7.10  )-----------( 95       ( 11 Nov 2024 05:29 )             22.5     11-11    141  |  111[H]  |  23.6[H]  ----------------------------<  107[H]  3.8   |  20.0[L]  |  1.09    Ca    7.7[L]      11 Nov 2024 05:29    TPro  4.1[L]  /  Alb  2.4[L]  /  TBili  0.6  /  DBili  x   /  AST  19  /  ALT  10  /  AlkPhos  48  11-10    PT/INR - ( 11 Nov 2024 05:29 )   PT: 24.0 sec;   INR: 2.08 ratio         PTT - ( 11 Nov 2024 05:29 )  PTT:35.2 sec  Urinalysis Basic - ( 11 Nov 2024 05:29 )    Color: x / Appearance: x / SG: x / pH: x  Gluc: 107 mg/dL / Ketone: x  / Bili: x / Urobili: x   Blood: x / Protein: x / Nitrite: x   Leuk Esterase: x / RBC: x / WBC x   Sq Epi: x / Non Sq Epi: x / Bacteria: x        A: 78 yo M with metastatic PDAC/duodenal cancer, with ongoing tumor bleeding and developing duodenal obstruction, anemic    PLAN  - Trend H&H  - F/U GI for endoscopic stenting for palliation of duodenal obstruction today 11/11  - Once stable acutely, bleeding controlled, obstruction addressed with stent, systemic therapy as outpatient for metastatic duodenal/pancreatic cancer with liver metastases  - if obstructs after duodenal stenting can consider operative GJ   - Remainder of care per primary team

## 2024-11-11 NOTE — PROGRESS NOTE ADULT - ATTENDING COMMENTS
78 yo M with metastatic PDAC/duodenal cancer, with ongoing tumor bleeding and developing duodenal obstruction, anemic    PLAN  - follow up CBC, F/U GI for possible endoscopic stenting for palliation of duodenal obstruction after cessation of bleeding  - Once stable acutely, bleeding controlled, obstruction addressed with stent, systemic therapy as outpatient for metastatic duodenal/pancreatic cancer with liver metastases  - if obstructs after duodenal stenting can consider operative GJ   - F/U radiation oncology for radiation to help control tumor bleeding
78 yo M with metastatic PDAC/duodenal cancer, with ongoing tumor bleeding and developing duodenal obstruction, anemic    PLAN  - follow up CBC, F/U GI for possible endoscopic stenting for palliation of duodenal obstruction after cessation of bleeding  - Once stable acutely, bleeding controlled, obstruction addressed with stent, systemic therapy as outpatient for metastatic duodenal/pancreatic cancer with liver metastases  - if obstructs after duodenal stenting can consider operative GJ   - F/U radiation oncology for radiation to help control tumor bleeding
No acute events overnight.  No additional blood since yesterday AM, no hemodynamic instability, no n/v or hematemesis    PLAN  - follow up CBC, once stable x 1-2 days, discussed with GI for possible endoscopic stenting for palliation of duodenal obstruction  - ideally, once bleeding stabilizes and duodenal obstruction addressed with stent, would plan for systemic therapy as outpatient for metastatic duodenal/pancreatic cancer with liver metastases  - follow up path  - if obstructs after duodenal stenting can consider operative GJ down the line if responding to systemic therapy and/or good disease control.
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, with ongoing tumor bleeding and developing duodenal obstruction, anemic    PLAN  - follow up CBC, F/U GI for possible endoscopic stenting for palliation of duodenal obstruction after cessation of bleeding  - Once stable acutely, bleeding controlled, obstruction addressed with stent, systemic therapy as outpatient for metastatic duodenal/pancreatic cancer with liver metastases  - if obstructs after duodenal stenting can consider operative GJ   - F/U radiation oncology for radiation to help control tumor bleeding .
78 YO M with duodenal/pancreatic ca with liver metastases.  Concern for developing duodenal obstruction, but currently tolerating clears and no obstruction noted on EGD.  On EGD, concern for some duodenal tumor bleeding as well.    PLAN:  - resuscitation and management of GI bleeding per primary team and GI.  - can continue clear liquids after bleeding addressed  - consider radiation oncology consultation if bleeding doesn't resolve.  - Eventually may require stenting by GI for palliative relief of developing duodenal obstruction vs surgical GJ.  - follow up path, follow up Med Onc consultation  - Palliative care consultation to discuss goals of care, as patient is DNR. Given likely liver metastases, no curative intent surgery planned. Would recommend Medical Oncology involvement re: prognosis.
79-year-old male with history of coronary artery disease status post CABG and hypertension here with acute blood loss anemia in the setting of duodenal ulcer along with head of pancreas mass.    Imaging reviewed in detail by myself, noted with heterogenous pancreatic head, CBD dilation, duodenal thickening, gastric distention w/ liver hypodensities, all suspicious for metastatic PDAC    PLAN:    - f/u pathology results  - f/u MRI results  - no role for surgical resection at this time  - f/u Hem/onc recs - would recommend chemotherapy   - f/u palliative and GOC would recommend palliative stent by GI .
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, with ongoing tumor bleeding and developing duodenal obstruction, anemic    PLAN  - follow up CBC, F/U GI for possible endoscopic stenting for palliation of duodenal obstruction after cessation of bleeding  - Once stable acutely, bleeding controlled, obstruction addressed with stent, systemic therapy as outpatient for metastatic duodenal/pancreatic cancer with liver metastases  - if obstructs after duodenal stenting can consider operative GJ   - F/U radiation oncology for radiation to help control tumor bleeding .
78 yo M with likely metastatic PDAC/duodenal cancer, with ongoing tumor bleeding and developing duodenal obstruction  Receiving blood transfusion this morning  Remains hemodynamically stable    PLAN  - CTA to evaluate for any bleeding amenable to IR intervention  - IR consultation to discuss embolization of tumor bleeding  - consider radiation oncology consultation if unable to localize bleeding  - appreciate GI recs regarding endoscopic interventions for possible duodenal obstruction.
80 YO M with duodenal/pancreatic ca with liver metastases.  Concern for developing duodenal obstruction, but currently tolerating clears and no obstruction noted on EGD.  On EGD, concern for some duodenal tumor bleeding as well.    PLAN:  - resuscitation and management of GI bleeding per primary team and GI.  - can continue clear liquids after bleeding addressed  - consider radiation oncology consultation if bleeding doesn't resolve.  - Eventually may require stenting by GI for palliative relief of developing duodenal obstruction vs surgical GJ.  - follow up path, follow up Med Onc consultation  - Palliative care consultation to discuss goals of care, as patient is DNR. Given likely liver metastases, no curative intent surgery planned. Would recommend Medical Oncology involvement re: prognosis.
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, with ongoing tumor bleeding and developing duodenal obstruction, anemic, planning palliative radiation.    PLAN  - planning for radiation to palliate tumor bleeding.  - once stable and bleeding controlled, obstruction addressed with endoscopic stent, would plan for systemic therapy as outpatient for metastatic duodenal/pancreatic cancer with liver metastases

## 2024-11-11 NOTE — CHART NOTE - NSCHARTNOTEFT_GEN_A_CORE
EGD with duodenal stenting performed with success.  PPI twice daily and Carafate 1 g p.o. 4 times daily.  Abdominal x-ray tomorrow.  Ice chips can be given today.  Clear liquid diet from tomorrow.  Monitor CBC.  Monitor INR.

## 2024-11-11 NOTE — PROGRESS NOTE ADULT - ASSESSMENT
This is an 80 yo F who follows Dr Foster/JAEL for a history of VICTORIA S/P IV iron  last Hb in the office on 10/8 was 13.1 Has received IV iron in the past   presented to [facility] [place] on 10/27 with few days of hematemesis and melena and was found to have anemia with Hgb of 7.4, s/p CT A/P at [facility] [place] showing enlarged heterogenous pancreatic head, CBD dilation, duodenal thickening, gastric distention w/ liver hypodensities, all suspicious for neoplasm, being transferred to [facility] for advanced GI and surgical oncology eval    Acute blood loss anemia due to GI bleeding from duodenal ulcer  - follows Dr Foster/JAEL  - History of VICTORIA -S/P IV iron    - last Hb in the office on 10/8 was 13.1    - EGD on 10/28 at [facility] [place] showed single ulcer at duodenal bulb  -  GI following- He has an obstruction on upper endoscopy that prohibited the passage of a 9 mm standard EGD scope.  -Surgery following- possible endoscopic stenting for palliation of duodenal obstruction  - ideally, once bleeding stabilizes and duodenal obstruction addressed with stent, would plan for systemic therapy as outpatient for metastatic duodenal/pancreatic cancer with liver metastases  - s/p 1 unit prbc for hgb of 6.5 reposnded to 8.1 patient feeling well oob no pain noted   NPO at this time pending possible procedure     Pancreatic mass  - CT A/P at [facility] [place] showing enlarged heterogenous pancreatic head, CBD dilation, duodenal thickening, gastric distention w/ liver hypodensities, all suspicious for neoplasm,  - Ca 19-9 markedly elevated 4903K  - MRI abdomen completed at [facility] [place] Technically limited study. Findings highly likely to represent metastatic pancreatic cancer, infiltrative primary pancreatic head neoplasm invading the duodenum and small presumed hepatic metastases.  -- S/p EGD/ EUS, showed duodenal mass,- Pathology is positive for malignant cells + Adeno  - Evaluated by Rad onc-  Causing GI bleeding, requiring multiple transfusions.  Has received 7u PRBC thus far.    -S/P SIM 11/6   -S/P RT 11/7  -  GI following- possible endoscopic stenting for palliation of duodenal obstruction after cessation of bleeding  - Once stable acutely, bleeding controlled, obstruction addressed with stent, systemic therapy as outpatient for metastatic duodenal/pancreatic cancer with liver metastases  - if obstructs after duodenal stenting can consider operative GJ   - F/U radiation oncology for radiation to help control tumor bleeding .      Will follow    This is an 80 yo F who follows Dr Foster/JAEL for a history of VICTORIA S/P IV iron  last Hb in the office on 10/8 was 13.1 Has received IV iron in the past   [place]ed to [facility] [place] [facility] [place] with few days of hematemesis and melena and was found to have anemia with Hgb of 7.4, s/p CT A/P at [facility] [place] showing enlarged heterogenous pancreatic head, CBD dilation, duodenal thickening, gastric distention w/ liver hypodensities, all suspicious for neoplasm, being transferred to [facility] for advanced GI and surgical oncology eval    Acute blood loss anemia due to GI bleeding from duodenal ulcer  - follows Dr Foster/JAEL  - History of VICTORIA -S/P IV iron    - last Hb in the office on 10/8 was 13.1    - EGD on 10/28 at [facility] [place] showed single ulcer at duodenal bulb  -  GI following- He has an obstruction on upper endoscopy that prohibited the passage of a 9 mm standard EGD scope.  -Surgery following- possible endoscopic stenting for palliation of duodenal obstruction    - ideally, once bleeding stabilizes and duodenal obstruction addressed with stent, would plan for systemic therapy as outpatient for metastatic duodenal/pancreatic cancer with liver metastases  -    His Hgb has been dropping again, he already had RT but is he bleeding from the same area or can something else be done? He is waiting for endoscopic stenting for palliation of duodenal obstruction after cessation of bleeding. Please give me a call to discuss [phone]     Pancreatic mass  - CT A/P at [facility] [place] showing enlarged heterogenous pancreatic head, CBD dilation, duodenal thickening, gastric distention w/ liver hypodensities, all suspicious for neoplasm,  - Ca 19-9 markedly elevated 4903K  - MRI abdomen completed at [facility] [place] Technically limited study. Findings highly likely to represent metastatic pancreatic cancer, infiltrative primary pancreatic head neoplasm invading the duodenum and small presumed hepatic metastases.  -- S/p EGD/ EUS, showed duodenal mass,- Pathology is positive for malignant cells + Adeno  - Evaluated by Rad onc-  Causing GI bleeding, requiring multiple transfusions.  Has received 7u PRBC thus far.    -S/P SIM 11/6   - S/P RT 11/7  -  GI following- possible endoscopic stenting for palliation of duodenal obstruction after cessation of bleeding  - hgb dropping again - reached out to Rad-onc to see if there is anything else they can offer to stop bleeding  - Will also reach out to surgery to see if anything palliative can be done to stop the bleeding   -  systemic therapy as outpatient for metastatic duodenal/pancreatic cancer with liver metastases    Will follow    This is an 78 yo F who follows Dr Foster/JAEL for a history of VICTORIA S/P IV iron  last Hb in the office on 10/8 was 13.1 Has received IV iron in the past   presented to [facility] [place] on 10/27 with few days of hematemesis and melena and was found to have anemia with Hgb of 7.4, s/p CT A/P at [facility] [place] showing enlarged heterogenous pancreatic head, CBD dilation, duodenal thickening, gastric distention w/ liver hypodensities, all suspicious for neoplasm, being transferred to [facility] for advanced GI and surgical oncology eval    Acute blood loss anemia due to GI bleeding from duodenal ulcer  - follows Dr Foster/JAEL  - History of IVCTORIA -S/P IV iron    - last Hb in the office on 10/8 was 13.1    - EGD on 10/28 at [facility] [place] showed single ulcer at duodenal bulb  -  GI following- He has an obstruction on upper endoscopy that prohibited the passage of a 9 mm standard EGD scope.  -Surgery following- possible endoscopic stenting for palliation of duodenal obstruction  - ideally, once bleeding stabilizes and duodenal obstruction addressed with stent, would plan for systemic therapy as outpatient for metastatic duodenal/pancreatic cancer with liver metastases      Pancreatic mass  - CT A/P at [facility] [place] showing enlarged heterogenous pancreatic head, CBD dilation, duodenal thickening, gastric distention w/ liver hypodensities, all suspicious for neoplasm,  - Ca 19-9 markedly elevated 4903K  - MRI abdomen completed at [facility] [place] Technically limited study. Findings highly likely to represent metastatic pancreatic cancer, infiltrative primary pancreatic head neoplasm invading the duodenum and small presumed hepatic metastases.  -- S/p EGD/ EUS, showed duodenal mass,- Pathology is positive for malignant cells + Adeno  - Evaluated by Rad onc-  Causing GI bleeding, requiring multiple transfusions.  Has received 7u PRBC thus far.    -S/P SIM 11/6   - S/P RT 11/7  -  GI following- possible endoscopic stenting for palliation of duodenal obstruction after cessation of bleeding  - hgb dropping again - reached out to Rad-onc to see if there is anything else they can offer to stop bleeding  - Rad onc states it takes some time for RT to work approx 3-7 days there is no role for further RT  - Supportive care for now- transfuse as needed for Hgb ,7  -  systemic therapy as outpatient for metastatic duodenal/pancreatic cancer with liver metastases    Will follow

## 2024-11-11 NOTE — PROGRESS NOTE ADULT - ASSESSMENT
79M with a history of coronary artery disease, gastroesophageal reflux, pancreatic mass, and glaucoma who presented to [facility] with hematemesis and melena found to have anemia requiring transfusion of packed red blood cells. CT of the abdomen was notable for an enlarged heterogenous pancreatic head, common bile duct dilation, duodenal thickening, gastric distention, and liver hypodensities. The patient was transfered to [facility] for evaluation and underwent endoscopy with findings of a duodenal mass. Packed red blood cells were transfused for recurrent anemia and the patient underwent radiation therapy for tumor bleeding.    Acute blood loss anemia  - Endoscopy (11/1) noted old blood, ulcerated mucosa in the duodenal bulb, and duodenal mass  - hgb today 7.5, slight downtrend  - will transfuse 1U prbc for optimization  - retacrit 40K x1 today  - also for 2 units plasma and vitamin K per GI for INR 2  - continue protonix drip  - for EGD and stent placement today with GI  - [place] [facility] reached out to for follow up    Thrombocytopenia  - stable  - heme/onc following, f/u lab results  - Monitoring platelet counts    Hypoxia 2/2 PNA  - Improved, currently on RA  - Afebrile, WBC wnl  - CXR without congestion but RLL infiltrate  - procal elevated, c/w abx    Acute kidney injury - resolved   - possibly 2/2 IV lasix  - hold diuretics for now  - No urological symptoms or signs of obstruction  - check urine lytes  - On intravenous fluids  - Continue to monitor renal functions    Pancreatic / Duodenal mass  - Prior MRI(10/29) noted few small poorly delineated hepatic lesions, likely metastases, 4.5 x 3.5 cm infiltrative pancreatic head mass invading the proximal duodenum, interruption of the intrapancreatic portion of the common bile and main pancreatic ducts at the level of the mass with only minimal upstream ductal dilatation  - CA 19-9 level elevated  - s/p radiation  - Biopsy results noted adenocarcinoma, pt aware  - Oncology following. planning for outpatient treatment once stable for d/c    Coronary artery disease / Hypertension  - SBP has been borderline 100s  - d/c amlodipine  - c/w low dose coreg  - Aspirin on hold due to bleeding and anemia  - d/c amlodipine  - c/w lowest dose coreg  - Lisinopril and spironolactone on hold as well    Glaucoma  - On timolol eye drops      Dispo: currently active pending stabilization of hgb, improvement in PO status and GI procedure   79M with a history of coronary artery disease, gastroesophageal reflux, pancreatic mass, and glaucoma who presented to [facility] with hematemesis and melena found to have anemia requiring transfusion of packed red blood cells. CT of the abdomen was notable for an enlarged heterogenous pancreatic head, common bile duct dilation, duodenal thickening, gastric distention, and liver hypodensities. The patient was transfered to [facility] for evaluation and underwent endoscopy with findings of a duodenal mass. Packed red blood cells were transfused for recurrent anemia and the patient underwent radiation therapy for tumor bleeding.    Acute blood loss anemia  - Endoscopy (11/1) noted old blood, ulcerated mucosa in the duodenal bulb, and duodenal mass  - s/p radiation on 11/7  - hgb today 7.5, slight downtrend  - will transfuse 1U prbc for optimization  - retacrit 40K x1 today  - also for 2 units plasma and vitamin K per GI for INR 2  - continue protonix drip  - [facility] reached out to for follow up who discussed with rad onc, may take 5-7 days to see improvement in bleeding following radiation    Inability to tolerate PO intake  - 2/2 gastric outlet obstruction from mass  - for EGD for stent placement with GI today  - surgery following, if no improvement after stent placement may be candidate for palliative surgery    Thrombocytopenia  - stable  - heme/onc following, f/u lab results  - Monitoring platelet counts    Hypoxia 2/2 PNA  - Improved, currently on RA  - Afebrile, WBC wnl  - CXR without congestion but RLL infiltrate  - procal elevated, c/w abx    Acute kidney injury - resolved   - possibly 2/2 IV lasix  - hold diuretics for now  - No urological symptoms or signs of obstruction  - check urine lytes  - On intravenous fluids  - Continue to monitor renal functions    Pancreatic / Duodenal mass  - Prior MRI(10/29) noted few small poorly delineated hepatic lesions, likely metastases, 4.5 x 3.5 cm infiltrative pancreatic head mass invading the proximal duodenum, interruption of the intrapancreatic portion of the common bile and main pancreatic ducts at the level of the mass with only minimal upstream ductal dilatation  - CA 19-9 level elevated  - s/p radiation 11/7  - Biopsy results noted adenocarcinoma, pt aware  - Oncology following. planning for outpatient treatment once stable for d/c    Coronary artery disease / Hypertension  - SBP has been borderline 100s  - d/c amlodipine  - c/w low dose coreg  - Aspirin on hold due to bleeding and anemia  - d/c amlodipine  - c/w lowest dose coreg  - Lisinopril and spironolactone on hold as well    Glaucoma  - On timolol eye drops      Dispo: currently active pending stabilization of hgb, improvement in PO status and GI procedure

## 2024-11-12 LAB
HCT VFR BLD CALC: 27.3 % — LOW (ref 39–50)
HGB BLD-MCNC: 9.2 G/DL — LOW (ref 13–17)
MCHC RBC-ENTMCNC: 31.2 PG — SIGNIFICANT CHANGE UP (ref 27–34)
MCHC RBC-ENTMCNC: 33.7 G/DL — SIGNIFICANT CHANGE UP (ref 32–36)
MCV RBC AUTO: 92.5 FL — SIGNIFICANT CHANGE UP (ref 80–100)
PLATELET # BLD AUTO: 94 K/UL — LOW (ref 150–400)
RBC # BLD: 2.95 M/UL — LOW (ref 4.2–5.8)
RBC # FLD: 14.8 % — HIGH (ref 10.3–14.5)
WBC # BLD: 7.08 K/UL — SIGNIFICANT CHANGE UP (ref 3.8–10.5)
WBC # FLD AUTO: 7.08 K/UL — SIGNIFICANT CHANGE UP (ref 3.8–10.5)

## 2024-11-12 PROCEDURE — 74018 RADEX ABDOMEN 1 VIEW: CPT | Mod: 26

## 2024-11-12 PROCEDURE — 99232 SBSQ HOSP IP/OBS MODERATE 35: CPT

## 2024-11-12 PROCEDURE — 99233 SBSQ HOSP IP/OBS HIGH 50: CPT

## 2024-11-12 RX ORDER — AMLODIPINE BESYLATE 10 MG
5 TABLET ORAL DAILY
Refills: 0 | Status: DISCONTINUED | OUTPATIENT
Start: 2024-11-12 | End: 2024-11-12

## 2024-11-12 RX ORDER — SPIRONOLACTONE 100 MG
25 TABLET ORAL DAILY
Refills: 0 | Status: DISCONTINUED | OUTPATIENT
Start: 2024-11-12 | End: 2024-11-14

## 2024-11-12 RX ORDER — CARVEDILOL 25 MG/1
6.25 TABLET, FILM COATED ORAL EVERY 12 HOURS
Refills: 0 | Status: DISCONTINUED | OUTPATIENT
Start: 2024-11-12 | End: 2024-11-14

## 2024-11-12 RX ADMIN — SUCRALFATE 1 GRAM(S): 1 SUSPENSION ORAL at 17:35

## 2024-11-12 RX ADMIN — SUCRALFATE 1 GRAM(S): 1 SUSPENSION ORAL at 05:18

## 2024-11-12 RX ADMIN — CARVEDILOL 6.25 MILLIGRAM(S): 25 TABLET, FILM COATED ORAL at 17:34

## 2024-11-12 RX ADMIN — Medication 1 DROP(S): at 21:21

## 2024-11-12 RX ADMIN — Medication 0.4 MILLIGRAM(S): at 21:20

## 2024-11-12 RX ADMIN — Medication 2 TABLET(S): at 21:20

## 2024-11-12 RX ADMIN — SUCRALFATE 1 GRAM(S): 1 SUSPENSION ORAL at 12:00

## 2024-11-12 RX ADMIN — CARVEDILOL 3.12 MILLIGRAM(S): 25 TABLET, FILM COATED ORAL at 05:18

## 2024-11-12 RX ADMIN — PANTOPRAZOLE SODIUM 40 MILLIGRAM(S): 40 TABLET, DELAYED RELEASE ORAL at 05:18

## 2024-11-12 RX ADMIN — Medication 1000 MILLIGRAM(S): at 11:59

## 2024-11-12 RX ADMIN — Medication 10 MILLIGRAM(S): at 21:20

## 2024-11-12 RX ADMIN — PANTOPRAZOLE SODIUM 40 MILLIGRAM(S): 40 TABLET, DELAYED RELEASE ORAL at 17:34

## 2024-11-12 NOTE — PROGRESS NOTE ADULT - ASSESSMENT
78 yo M with a history of CAD, GERD, pancreatic mass, and glaucoma admitted with anemia, found to have an enlarged heterogenous pancreatic head, common bile duct dilation, duodenal thickening, gastric distention, and liver hypodensity on imaging.     #Pancreatic adenocarcinoma  #Duodenal mass  - MRI (10/29) noted few small poorly delineated hepatic lesions, likely metastases, 4.5 x 3.5 cm infiltrative pancreatic head mass invading the proximal duodenum, interruption of the intrapancreatic portion of the common bile and main pancreatic ducts at the level of the mass with only minimal upstream ductal dilatation  - Elevated CA 19-9  - S/p EGD/EUS, showed duodenal mass, s/p biopsy. Pathology is positive for malignant cells + Adenocarcinoma.   - Seen by oncology, now s/p radiation x sessions  - S/p EGD yesterday with duodenal stent placement as above  - Tolerating CLD today, can advance to full liquid diet today  - Active type and screen, INR <1.5, Plt count >50K for procedure  - Trend CBC, CMP. Replete electrolytes as needed.   - Discussed with the patient at bedside

## 2024-11-12 NOTE — PROGRESS NOTE ADULT - TIME BILLING
Review of chart, review of images and discussion with patient
Review of chart, imaging, discussion with patient
Personal review of chart and imaging. Discussion with patient .
I spent 40 minutes reviewing the patient's chart, labs, imaging, interviewing and examining patient, and discussing plan of care with the patient, resident/PA team, and other providers, excluding separately billable procedures and teaching time.
chart review, patient evaluation, documentation, coordination of care and discussion with nurses, consultants.
I spent 40 minutes reviewing the patient's chart, labs, imaging, interviewing and examining patient, and discussing plan of care with the patient, resident/PA team, and other providers, excluding separately billable procedures and teaching time.
chart review, patient evaluation, documentation, coordination of care and discussion with nurses, consultants, ACP, social work, case management.
chart review, patient evaluation, documentation, coordination of care and discussion with nurses.
I spent 40 minutes reviewing the patient's chart, labs, imaging, interviewing and examining patient, and discussing plan of care with the patient, resident/PA team, and other providers, excluding separately billable procedures and teaching time.
Reviewing his imaging studies here and lab work.

## 2024-11-12 NOTE — PROGRESS NOTE ADULT - ASSESSMENT
This is an 80 yo F who follows Dr Foster/JAEL for a history of VICTORIA S/P IV iron  last Hb in the office on 10/8 was 13.1 Has received IV iron in the past   presented to [facility] on 10/27 with few days of hematemesis and melena and was found to have anemia with Hgb of 7.4, s/p CT A/P at [facility] showing enlarged heterogenous pancreatic head, CBD dilation, duodenal thickening, gastric distention w/ liver hypodensities, all suspicious for neoplasm, being transferred to [facility] for advanced GI and surgical oncology eval    Acute blood loss anemia due to GI bleeding from duodenal ulcer  - follows Dr Foster/JAEL  - History of VICTORIA -S/P IV iron    - last Hb in the office on 10/8 was 13.1    - EGD on 10/28 at [facility] showed single ulcer at duodenal bulb  -  GI following- He has an obstruction on upper endoscopy that prohibited the passage of a 9 mm standard EGD scope.  - ideally, once bleeding stabilizes and duodenal obstruction addressed with stent, would plan for systemic therapy as outpatient for metastatic duodenal/pancreatic cancer with liver metastases      Pancreatic mass  - CT A/P at [facility] showing enlarged heterogenous pancreatic head, CBD dilation, duodenal thickening, gastric distention w/ liver hypodensities, all suspicious for neoplasm,  - Ca 19-9 markedly elevated [id number]  - MRI abdomen completed at [facility] Technically limited study. Findings highly likely to represent metastatic pancreatic cancer, infiltrative primary pancreatic head neoplasm invading the duodenum and small presumed hepatic metastases.  -- S/p EGD/ EUS, showed duodenal mass,- Pathology is positive for malignant cells + Adeno  - Evaluated by Rad onc-  Causing GI bleeding, requiring multiple transfusions.  Has received 7u PRBC thus far.    -S/P SIM 11/6   - S/P RT 11/7  -  GI following- S/P EGD with duodenal stenting   PPI twice daily and Carafate 1 g p.o. 4 times daily.    - Abdominal x-ray today.    - can advance diet to Clear liquid    - hgb dropping again - reached out to Rad-onc to see if there is anything else they can offer to stop bleeding  - Rad onc states it takes some time for RT to work approx 3-7 days there is no role for further RT  - Supportive care for now- transfuse as needed for Hgb ,7  -  systemic therapy as outpatient for metastatic duodenal/pancreatic cancer with liver metastases    Will follow

## 2024-11-12 NOTE — PROGRESS NOTE ADULT - SUBJECTIVE AND OBJECTIVE BOX
[facility] Division of Medicine    SUBJECTIVE / OVERNIGHT EVENTS: No overnight events as per RN. Pt seen at the bedside. Endorses feeling better. On clear liquid diet today per GI. Pt having BMs. Denies any new complaints. All other systems reviewed and are negative.    MEDICATIONS  (STANDING):  bisacodyl Suppository 10 milliGRAM(s) Rectal once  carvedilol 3.125 milliGRAM(s) Oral every 12 hours  cefTRIAXone Injectable. 1000 milliGRAM(s) IV Push every 24 hours  glycerin Suppository - Adult 1 Suppository(s) Rectal at bedtime  pantoprazole  Injectable 40 milliGRAM(s) IV Push two times a day  rosuvastatin 10 milliGRAM(s) Oral at bedtime  senna 2 Tablet(s) Oral at bedtime  sucralfate 1 Gram(s) Oral four times a day  sucralfate suspension 1 Gram(s) Oral every 6 hours  tamsulosin 0.4 milliGRAM(s) Oral at bedtime  timolol 0.5% Solution 1 Drop(s) Both EYES at bedtime    MEDICATIONS  (PRN):  acetaminophen     Tablet .. 650 milliGRAM(s) Oral every 6 hours PRN Temp greater or equal to 38C (100.4F), Mild Pain (1 - 3)  aluminum hydroxide/magnesium hydroxide/simethicone Suspension 30 milliLiter(s) Oral every 4 hours PRN Dyspepsia  melatonin 3 milliGRAM(s) Oral at bedtime PRN Insomnia  metoclopramide Injectable 5 milliGRAM(s) IV Push every 6 hours PRN nausea      I&O's Summary    11 Nov 2024 07:01  -  12 Nov 2024 07:00  --------------------------------------------------------  IN: 0 mL / OUT: 900 mL / NET: -900 mL        acetaminophen     Tablet .. 650 milliGRAM(s) Oral every 6 hours PRN  aluminum hydroxide/magnesium hydroxide/simethicone Suspension 30 milliLiter(s) Oral every 4 hours PRN  bisacodyl Suppository 10 milliGRAM(s) Rectal once  carvedilol 3.125 milliGRAM(s) Oral every 12 hours  cefTRIAXone Injectable. 1000 milliGRAM(s) IV Push every 24 hours  glycerin Suppository - Adult 1 Suppository(s) Rectal at bedtime  melatonin 3 milliGRAM(s) Oral at bedtime PRN  metoclopramide Injectable 5 milliGRAM(s) IV Push every 6 hours PRN  pantoprazole  Injectable 40 milliGRAM(s) IV Push two times a day  rosuvastatin 10 milliGRAM(s) Oral at bedtime  senna 2 Tablet(s) Oral at bedtime  sucralfate 1 Gram(s) Oral four times a day  sucralfate suspension 1 Gram(s) Oral every 6 hours  tamsulosin 0.4 milliGRAM(s) Oral at bedtime  timolol 0.5% Solution 1 Drop(s) Both EYES at bedtime      PHYSICAL EXAM:  Vital Signs Last 24 Hrs  T(C): 36.9 (12 Nov 2024 09:49), Max: 37 (11 Nov 2024 14:41)  T(F): 98.4 (12 Nov 2024 09:49), Max: 98.6 (11 Nov 2024 14:41)  HR: 75 (12 Nov 2024 09:49) (65 - 83)  BP: 149/57 (12 Nov 2024 09:49) (144/65 - 180/60)  BP(mean): --  RR: 18 (12 Nov 2024 09:49) (17 - 18)  SpO2: 95% (12 Nov 2024 09:49) (92% - 97%)    Parameters below as of 12 Nov 2024 09:49  Patient On (Oxygen Delivery Method): nasal cannula  O2 Flow (L/min): 2        CONSTITUTIONAL: no apparent distress  RESP: No respiratory distress, clear to auscultation bilaterally, no wheezes or rales  CV: RRR, +S1S2, +1 peripheral edema  GI: Soft, NT, ND  PSYCH: A+O x 3, mood and affect appropriate  NEURO: Cooperative, upper and lower motor function grossly intact bilaterally, sensation grossly intact throughout    LABS:                        9.2    7.08  )-----------( 94       ( 12 Nov 2024 05:30 )             27.3     11-11    141  |  111[H]  |  23.6[H]  ----------------------------<  107[H]  3.8   |  20.0[L]  |  1.09    Ca    7.7[L]      11 Nov 2024 05:29      PT/INR - ( 11 Nov 2024 11:30 )   PT: 18.3 sec;   INR: 1.58 ratio         PTT - ( 11 Nov 2024 11:30 )  PTT:36.2 sec      Urinalysis Basic - ( 11 Nov 2024 05:29 )    Color: x / Appearance: x / SG: x / pH: x  Gluc: 107 mg/dL / Ketone: x  / Bili: x / Urobili: x   Blood: x / Protein: x / Nitrite: x   Leuk Esterase: x / RBC: x / WBC x   Sq Epi: x / Non Sq Epi: x / Bacteria: x        CAPILLARY BLOOD GLUCOSE          IMAGING:

## 2024-11-12 NOTE — PROGRESS NOTE ADULT - SUBJECTIVE AND OBJECTIVE BOX
This is an 80 yo F who follows Dr Foster/JEAL for a history of VICTORIA S/P IV iron  last Hb in the office on 10/8 was 13.1 Has received IV iron in the past   presented to [facility] [place] 10/27 with few days of hematemesis and melena and was found to have anemia with Hgb of 7.4, s/p CT A/P at [facility] showing enlarged heterogenous pancreatic head, CBD dilation, duodenal thickening, gastric distention w/ liver hypodensities, all suspicious for neoplasm, being transferred to [facility] for advanced GI and surgical oncology eval    MEDICATIONS  (STANDING):  carvedilol 6.25 milliGRAM(s) Oral every 12 hours  pantoprazole Infusion 8 mG/Hr (10 mL/Hr) IV Continuous <Continuous>  rosuvastatin 10 milliGRAM(s) Oral at bedtime  timolol 0.5% Solution 1 Drop(s) Both EYES at bedtime    MEDICATIONS  (PRN):  acetaminophen     Tablet .. 650 milliGRAM(s) Oral every 6 hours PRN Temp greater or equal to 38C (100.4F), Mild Pain (1 - 3)  melatonin 3 milliGRAM(s) Oral at bedtime PRN Insomnia  ondansetron Injectable 4 milliGRAM(s) IV Push every 6 hours PRN Nausea and/or Vomiting    Vital Signs Last 24 Hrs  T(C): 36.9 (12 Nov 2024 09:49), Max: 37 (11 Nov 2024 14:41)  T(F): 98.4 (12 Nov 2024 09:49), Max: 98.6 (11 Nov 2024 14:41)  HR: 75 (12 Nov 2024 09:49) (65 - 83)  BP: 149/57 (12 Nov 2024 09:49) (144/65 - 180/60)  BP(mean): --  RR: 18 (12 Nov 2024 09:49) (17 - 18)  SpO2: 95% (12 Nov 2024 09:49) (92% - 97%)    Parameters below as of 12 Nov 2024 09:49  Patient On (Oxygen Delivery Method): nasal cannula  O2 Flow (L/min): 2      GENERAL:  Well-appearing elderly male, not in acute distress  EYES:  Clear conjunctiva, extraocular movement intact  ENT: Moist mucous membranes  RESP:   lungs clear to ausculation   CV: Regular rate and rhythm  GI: Soft, non-tender, non-distended  NEURO: Awake, alert, conversant   PSYCH: Calm, cooperative  SKIN: No rash or lesions, warm and dry    CBC                          9.2    7.08  )-----------( 94       ( 12 Nov 2024 05:30 )             27.3                             7.5    7.10  )-----------( 95       ( 11 Nov 2024 05:29 )             22.5                           6.5    7.51  )-----------( 72       ( 09 Nov 2024 04:35 )             19.8                           8.8    8.21  )-----------( 84       ( 08 Nov 2024 04:50 )             26.3                   CHEM      11-11    141  |  111[H]  |  23.6[H]  ----------------------------<  107[H]  3.8   |  20.0[L]  |  1.09    Ca    7.7[L]      11 Nov 2024 05:29    TPro  4.1[L]  /  Alb  2.4[L]  /  TBili  0.6  /  DBili  x   /  AST  19  /  ALT  10  /  AlkPhos  48  11-10      11-09    141  |  110[H]  |  54.1[H]  ----------------------------<  105[H]  4.2   |  21.0[L]  |  1.69[H]    Ca    7.5[L]      09 Nov 2024 04:35        11-08    149[H]  |  109[H]  |  34.2[H]  ----------------------------<  147[H]  3.4[L]   |  26.0  |  1.48[H]    Ca    7.9[L]      08 Nov 2024 04:50            11-05    140  |  111[H]  |  19.8  ----------------------------<  108[H]  4.0   |  21.0[L]  |  0.86    Ca    8.0[L]      05 Nov 2024 08:15        11-05    140  |  111[H]  |  19.8  ----------------------------<  108[H]  4.0   |  21.0[L]  |  0.86    Ca    8.0[L]      05 Nov 2024 08:15  Mg     1.8     11-04

## 2024-11-12 NOTE — PROGRESS NOTE ADULT - SUBJECTIVE AND OBJECTIVE BOX
Chief Complaint:  Patient is a 79y old  Male who presents with a chief complaint of Acute blood loss anemia due to GI bleeding from duodenal ulcer, pancreatic mass (2024 10:23)    HPI/ 24 hr events:  Patient seen and examined at bedside. He denies N/V/D/C. He reports 2 large bowel movements this morning. He denies nausea or vomiting and was tolerating clear liquid diet.     REVIEW OF SYSTEMS:   12 point review of systems completed and negative other than as stated in HPI    MEDICATIONS:   MEDICATIONS  (STANDING):  bisacodyl Suppository 10 milliGRAM(s) Rectal once  carvedilol 6.25 milliGRAM(s) Oral every 12 hours  cefTRIAXone Injectable. 1000 milliGRAM(s) IV Push every 24 hours  glycerin Suppository - Adult 1 Suppository(s) Rectal at bedtime  pantoprazole  Injectable 40 milliGRAM(s) IV Push two times a day  rosuvastatin 10 milliGRAM(s) Oral at bedtime  senna 2 Tablet(s) Oral at bedtime  sucralfate 1 Gram(s) Oral four times a day  sucralfate suspension 1 Gram(s) Oral every 6 hours  tamsulosin 0.4 milliGRAM(s) Oral at bedtime  timolol 0.5% Solution 1 Drop(s) Both EYES at bedtime    MEDICATIONS  (PRN):  acetaminophen     Tablet .. 650 milliGRAM(s) Oral every 6 hours PRN Temp greater or equal to 38C (100.4F), Mild Pain (1 - 3)  aluminum hydroxide/magnesium hydroxide/simethicone Suspension 30 milliLiter(s) Oral every 4 hours PRN Dyspepsia  melatonin 3 milliGRAM(s) Oral at bedtime PRN Insomnia  metoclopramide Injectable 5 milliGRAM(s) IV Push every 6 hours PRN nausea      phytonadione  IVPB: [Known as aquaMEPHYTON IVPB]  5 milliGRAM(s) in dextrose 5% 50 milliLiter(s), IV Intermittent, once, infuse over 30 Minute(s), Stop After 1 Doses  Administration Instructions: This is a High Alert Medication. (24 @ 07:23)  Plasma Order:  Unit 1, STAT   Indication: INR >=2.0 surgery or invasive procedure (in the absence of warfarin)    Infuse Unit : 2 Hours (24 @ 07:23)  Plasma Order:  Unit 1, STAT   Indication: INR >=2.0 surgery or invasive procedure (in the absence of warfarin)    Infuse Unit : 2 Hours (24 @ 07:23)  Packed Red Cells Order:  1 Unit  Indication: Anemia due to Active Hemorrhage - Medical Conditions e.  Infuse Unit : 3 Hours (24 @ 07:03)  Packed Red Cells Order:  1 Unit  Indication: Hgb <7 gm/dL  Infuse Unit : 3 Hours (24 @ 06:34)  Packed Red Cells Order:  1 Unit  Indication: Anemia due to Active Hemorrhage - Medical Conditions e.  Infuse Unit : 3 Hours (24 @ 08:26)        DIET:  Diet, Clear Liquid (24 @ 07:11) [Active]      ALLERGIES:   Allergies    Gluten (Stomach Upset; Diarrhea)  No Known Drug Allergies    Intolerances    Lipitor (Other)      PHYSICAL EXAM:   VITAL SIGNS:   Vital Signs Last 24 Hrs  T(C): 36.9 (2024 09:49), Max: 37 (2024 14:41)  T(F): 98.4 (2024 09:49), Max: 98.6 (2024 14:41)  HR: 75 (2024 09:49) (65 - 83)  BP: 149/57 (2024 09:49) (144/65 - 180/60)  BP(mean): --  RR: 18 (2024 09:49) (17 - 18)  SpO2: 95% (2024 09:49) (92% - 97%)    Parameters below as of 2024 09:49  Patient On (Oxygen Delivery Method): nasal cannula  O2 Flow (L/min): 2    I&O's Summary    2024 07:01  -  2024 07:00  --------------------------------------------------------  IN: 0 mL / OUT: 900 mL / NET: -900 mL      GENERAL:  No acute distress  HEENT:  NC/AT, conjunctiva clear, sclera anicteric  CHEST:  No increased effort  HEART:  Regular rate  ABDOMEN:  Soft, non-tender, non-distended, normoactive bowel sounds, no rebound or guarding  EXTREMITIES: No edema  SKIN:  Warm, dry  NEURO:  Calm, cooperative    LABS:                        9.2    7.08  )-----------( 94       ( 2024 05:30 )             27.3     Hemoglobin: 9.2 g/dL (24 @ 05:30)  Hemoglobin: 7.5 g/dL (24 @ 05:29)  Hemoglobin: 7.8 g/dL (11-10-24 @ 06:50)  Hemoglobin: 8.2 g/dL (24 @ 20:50)        141  |  111[H]  |  23.6[H]  ----------------------------<  107[H]  3.8   |  20.0[L]  |  1.09    Ca    7.7[L]      2024 05:29    PT/INR - ( 2024 11:30 )   PT: 18.3 sec;   INR: 1.58 ratio      PTT - ( 2024 11:30 )  PTT:36.2 sec      RADIOLOGY & ADDITIONAL STUDIES:  I personally reviewed the studies and agree with the radiologists review    EGD Report        Date: 2024        Patient Name: WILLIAM GEORGE BEHRLE        MRN: [id number]        Account Number:        [id number]        Gender: Male         (age): 1945 (79)        Instrument(s):        Olympus: GIF  ([id number])([id number])        Attending/Fellow:        Galindo Robles MD                Procedure Room #:        PROCEDURE ROOM 2        Referring Physician:        EMMETT SIMS        [address] [place] [id number] [phone] (phone)        ASA Class:    P3 - 2024 3:56 PM Galindo Robles MD        History of Present Illness:  The patient with obstructing duodenal ulcerated mass.  It is adenocarcinoma on  biopsy.The patient required radiation due to continuous bleeding from the mass.  He is here for a possible duodenal stenting due to gastric outlet obstruction.    Administered Medications:  As per Anesthesiology Record        Indications:    Acute blood loss anemia - D62  Duodenal obstruction  Duodenal ulcer, unsp as acute or chronic, w/o hemor or perf: 532.90 - K26.9  Duodenal mass - K31.89      Procedure:    The procedure, indications, preparation and potential complications were  explained to the patient, who indicated understanding and signed the  corresponding consent forms. MAC was administered by anesthesiologist.  Continuous pulse oximetry and blood pressure monitoring were used throughout the  procedure. Supplemental oxygen was used. Patient wasplaced in the left lateral  decubitus position. The endoscope was introduced through the mouth and advanced  under direct visualization until the duodenal bulb was reached. Patient  tolerance to the procedure was good. The procedure was not difficult. Blood loss  was minimal.        Limitations/Complications:    There were no apparent limitations or complications        Findings:    Esophagus Mucosa Normal mucosa was noted in the whole esophagus.  Stomach Mucosa Normal mucosa was noted in the whole stomach.  Duodenum Additional duodenum findings There was erythematous mucosa in the  duodenal bulb and at the duodenal sweep there was a ulcerated lumen obstructing  mass. Under fluoroscopy, a guidewire was advancedthrough the balloontipped  catheter and advanced into the jejunum. Then the balloon was inflated and then  the contrast was injected. Then we decided to place a 22 mm x 60 mm uncovered  duodenal stent under endoscopy and fluoroscopy guidance. Then Gastrografin was  injected to confirm the patency of the stent.    Impressions:  Normal mucosa in the gastroesophageal junction.  Normal mucosa in the whole stomach.  There was erythematous mucosa in the duodenal bulb and at the duodenal sweep  there was a ulcerated lumen obstructing mass. Under fluoroscopy, a guidewire was  advanced through the balloontipped catheter and advanced into the jejunum. Then  the balloon was inflated and then the contrast was injected. Then we decided to  place a 22 mm x 60 mm uncovered duodenal stent under endoscopy and fluoroscopy  guidance. Then Gastrografin was injected to confirm the patency of the stent.      Plan:  Return to floor for further management. Abdominal x-ray tomorrow.  N.p.o. except  for sips of ice-chips/water.  PPI twice daily, Carafate 1 g p.o. 4 times daily.  Advance diet from tomorrow.    Galindo Robles MD        Version 1, Electronically signed on 2024 4:04:46 PM by Galindo Robles MD

## 2024-11-13 LAB
ANION GAP SERPL CALC-SCNC: 9 MMOL/L — SIGNIFICANT CHANGE UP (ref 5–17)
BUN SERPL-MCNC: 14.3 MG/DL — SIGNIFICANT CHANGE UP (ref 8–20)
CALCIUM SERPL-MCNC: 7.8 MG/DL — LOW (ref 8.4–10.5)
CHLORIDE SERPL-SCNC: 108 MMOL/L — SIGNIFICANT CHANGE UP (ref 96–108)
CO2 SERPL-SCNC: 21 MMOL/L — LOW (ref 22–29)
CREAT SERPL-MCNC: 0.88 MG/DL — SIGNIFICANT CHANGE UP (ref 0.5–1.3)
EGFR: 87 ML/MIN/1.73M2 — SIGNIFICANT CHANGE UP
GLUCOSE SERPL-MCNC: 109 MG/DL — HIGH (ref 70–99)
HCT VFR BLD CALC: 26.9 % — LOW (ref 39–50)
HGB BLD-MCNC: 9.1 G/DL — LOW (ref 13–17)
MCHC RBC-ENTMCNC: 30.7 PG — SIGNIFICANT CHANGE UP (ref 27–34)
MCHC RBC-ENTMCNC: 33.8 G/DL — SIGNIFICANT CHANGE UP (ref 32–36)
MCV RBC AUTO: 90.9 FL — SIGNIFICANT CHANGE UP (ref 80–100)
PLATELET # BLD AUTO: 92 K/UL — LOW (ref 150–400)
POTASSIUM SERPL-MCNC: 3.5 MMOL/L — SIGNIFICANT CHANGE UP (ref 3.5–5.3)
POTASSIUM SERPL-SCNC: 3.5 MMOL/L — SIGNIFICANT CHANGE UP (ref 3.5–5.3)
RBC # BLD: 2.96 M/UL — LOW (ref 4.2–5.8)
RBC # FLD: 14.8 % — HIGH (ref 10.3–14.5)
SODIUM SERPL-SCNC: 138 MMOL/L — SIGNIFICANT CHANGE UP (ref 135–145)
WBC # BLD: 6.27 K/UL — SIGNIFICANT CHANGE UP (ref 3.8–10.5)
WBC # FLD AUTO: 6.27 K/UL — SIGNIFICANT CHANGE UP (ref 3.8–10.5)

## 2024-11-13 PROCEDURE — 99232 SBSQ HOSP IP/OBS MODERATE 35: CPT

## 2024-11-13 RX ADMIN — CARVEDILOL 6.25 MILLIGRAM(S): 25 TABLET, FILM COATED ORAL at 05:37

## 2024-11-13 RX ADMIN — Medication 10 MILLIGRAM(S): at 22:03

## 2024-11-13 RX ADMIN — Medication 0.4 MILLIGRAM(S): at 22:03

## 2024-11-13 RX ADMIN — SUCRALFATE 1 GRAM(S): 1 SUSPENSION ORAL at 05:38

## 2024-11-13 RX ADMIN — Medication 25 MILLIGRAM(S): at 05:38

## 2024-11-13 RX ADMIN — Medication 1000 MILLIGRAM(S): at 10:56

## 2024-11-13 RX ADMIN — Medication 2 TABLET(S): at 22:03

## 2024-11-13 RX ADMIN — Medication 1 DROP(S): at 22:04

## 2024-11-13 RX ADMIN — SUCRALFATE 1 GRAM(S): 1 SUSPENSION ORAL at 05:37

## 2024-11-13 RX ADMIN — SUCRALFATE 1 GRAM(S): 1 SUSPENSION ORAL at 00:15

## 2024-11-13 RX ADMIN — PANTOPRAZOLE SODIUM 40 MILLIGRAM(S): 40 TABLET, DELAYED RELEASE ORAL at 05:37

## 2024-11-13 RX ADMIN — SUCRALFATE 1 GRAM(S): 1 SUSPENSION ORAL at 17:56

## 2024-11-13 RX ADMIN — SUCRALFATE 1 GRAM(S): 1 SUSPENSION ORAL at 11:00

## 2024-11-13 RX ADMIN — PANTOPRAZOLE SODIUM 40 MILLIGRAM(S): 40 TABLET, DELAYED RELEASE ORAL at 17:57

## 2024-11-13 RX ADMIN — CARVEDILOL 6.25 MILLIGRAM(S): 25 TABLET, FILM COATED ORAL at 17:57

## 2024-11-13 NOTE — PROGRESS NOTE ADULT - SUBJECTIVE AND OBJECTIVE BOX
Chief Complaint: This is a 79y old man patient being seen in follow-up consultation for pancreatic adenocarcinoma, duodenal ulcer    Interval HPI / 24H events:  Patient seen and examined this morning, expressed not wanting to take clear liquids. Requesting to advance diet.  Patient denies nausea, vomiting, diarrhea, constipation. Reported 2 BM yesterday.     Review of Systems:  . Constitutional: No fever, chills  . HEENT: Negative  · Respiratory and Thorax: No shortness of breath, no cough  · Cardiovascular: No chest pain, palpitation, no dizziness  · Gastrointestinal: see above  · Genitourinary: No hematuria  · Musculoskeletal: Negative  · Neurological: negative  · Psychiatric: no agitation, no anxiety      PAST MEDICAL/SURGICAL HISTORY:  CAD (coronary artery disease)    Hypertension    Glaucoma    Pancreatic mass    Duodenal ulcer    GERD (gastroesophageal reflux disease)    S/P inguinal hernia repair    H/O aortic valve replacement      MEDICATIONS  (STANDING):  bisacodyl Suppository 10 milliGRAM(s) Rectal once  carvedilol 6.25 milliGRAM(s) Oral every 12 hours  cefTRIAXone Injectable. 1000 milliGRAM(s) IV Push every 24 hours  glycerin Suppository - Adult 1 Suppository(s) Rectal at bedtime  pantoprazole  Injectable 40 milliGRAM(s) IV Push two times a day  rosuvastatin 10 milliGRAM(s) Oral at bedtime  senna 2 Tablet(s) Oral at bedtime  spironolactone 25 milliGRAM(s) Oral daily  sucralfate 1 Gram(s) Oral four times a day  sucralfate suspension 1 Gram(s) Oral every 6 hours  tamsulosin 0.4 milliGRAM(s) Oral at bedtime  timolol 0.5% Solution 1 Drop(s) Both EYES at bedtime    MEDICATIONS  (PRN):  acetaminophen     Tablet .. 650 milliGRAM(s) Oral every 6 hours PRN Temp greater or equal to 38C (100.4F), Mild Pain (1 - 3)  aluminum hydroxide/magnesium hydroxide/simethicone Suspension 30 milliLiter(s) Oral every 4 hours PRN Dyspepsia  melatonin 3 milliGRAM(s) Oral at bedtime PRN Insomnia  metoclopramide Injectable 5 milliGRAM(s) IV Push every 6 hours PRN nausea    Gluten (Stomach Upset; Diarrhea)  No Known Drug Allergies  Lipitor (Other)    T(C): 36.9 (11-13-24 @ 08:29), Max: 37.3 (11-12-24 @ 20:28)  HR: 65 (11-13-24 @ 08:29) (65 - 70)  BP: 125/67 (11-13-24 @ 08:29) (125/67 - 145/58)  RR: 18 (11-13-24 @ 08:29) (18 - 18)  SpO2: 92% (11-13-24 @ 08:29) (92% - 95%)    I&O's Summary    12 Nov 2024 07:01  -  13 Nov 2024 07:00  --------------------------------------------------------  IN: 240 mL / OUT: 300 mL / NET: -60 mL      PHYSICAL EXAM:  Constitutional: No acute distress  Neuro: Awake alert, oriented  HEENT: anicteric sclerae  CV: regular rate, regular rhythm  Pulm/chest: lung sounds diminished bilaterally, no accessory muscle use noted  Abd: soft, nontender, nondistended +bowel sounds. No rigidity, rebound tenderness, or guarding  Skin: warm, no jaundice   Psych: calm, cooperative      LABS:               9.1    6.27  )-----------( 92       ( 11-13 @ 05:14 )             26.9                9.2    7.08  )-----------( 94       ( 11-12 @ 05:30 )             27.3                7.5    7.10  )-----------( 95       ( 11-11 @ 05:29 )             22.5       11-13    138  |  108  |  14.3  ----------------------------<  109[H]  3.5   |  21.0[L]  |  0.88    Ca    7.8[L]      13 Nov 2024 05:14        PT/INR - ( 11 Nov 2024 11:30 )   PT: 18.3 sec;   INR: 1.58 ratio         PTT - ( 11 Nov 2024 11:30 )  PTT:36.2 sec    < from: EGD (11.11.24 @ 00:00) >    Findings:      Esophagus Mucosa Normal mucosa was noted in the whole esophagus.      Stomach Mucosa Normal mucosa was noted in the whole stomach.      Duodenum Additional duodenum findings There was erythematous mucosa in the    duodenal bulb and at the duodenal sweep there was a ulcerated lumen obstructing    mass. Under fluoroscopy, a guidewire was advancedthrough the balloontipped    catheter and advanced into the jejunum. Then the balloon was inflated and then    the contrast was injected. Then we decided to place a 22 mm x 60 mm uncovered    duodenal stent under endoscopy and fluoroscopy guidance. Then Gastrografin was    injected to confirm the patency of the stent.      Impressions:      Normal mucosa in the gastroesophageal junction.      Normal mucosa in the whole stomach.      There was erythematous mucosa in the duodenal bulb and at the duodenal sweep    there was a ulcerated lumen obstructing mass. Under fluoroscopy, a guidewire was    advanced through the balloontipped catheter and advanced into the jejunum. Then    the balloon was inflated and then the contrast was injected.Then we decided to    place a 22 mm x 60 mm uncovered duodenal stent under endoscopy and fluoroscopy    guidance. Then Gastrografin was injected to confirm the patency of the stent.        Plan:      Return to floor for further management. Abdominal x-ray tomorrow.  N.p.o. except    for sips of ice-chips/water.  PPI twice daily, Carafate 1 g p.o. 4 times daily.    Advance diet from tomorrow.      < end of copied text >

## 2024-11-13 NOTE — PROGRESS NOTE ADULT - SUBJECTIVE AND OBJECTIVE BOX
[facility] Division of Medicine    SUBJECTIVE / OVERNIGHT EVENTS: No overnight events as per RN. Pt seen at the bedside. Tolerating clear liquid diet. Denies any new complaints. All other systems reviewed and are negative.    MEDICATIONS  (STANDING):  bisacodyl Suppository 10 milliGRAM(s) Rectal once  carvedilol 6.25 milliGRAM(s) Oral every 12 hours  cefTRIAXone Injectable. 1000 milliGRAM(s) IV Push every 24 hours  glycerin Suppository - Adult 1 Suppository(s) Rectal at bedtime  pantoprazole  Injectable 40 milliGRAM(s) IV Push two times a day  rosuvastatin 10 milliGRAM(s) Oral at bedtime  senna 2 Tablet(s) Oral at bedtime  spironolactone 25 milliGRAM(s) Oral daily  sucralfate 1 Gram(s) Oral four times a day  sucralfate suspension 1 Gram(s) Oral every 6 hours  tamsulosin 0.4 milliGRAM(s) Oral at bedtime  timolol 0.5% Solution 1 Drop(s) Both EYES at bedtime    MEDICATIONS  (PRN):  acetaminophen     Tablet .. 650 milliGRAM(s) Oral every 6 hours PRN Temp greater or equal to 38C (100.4F), Mild Pain (1 - 3)  aluminum hydroxide/magnesium hydroxide/simethicone Suspension 30 milliLiter(s) Oral every 4 hours PRN Dyspepsia  melatonin 3 milliGRAM(s) Oral at bedtime PRN Insomnia  metoclopramide Injectable 5 milliGRAM(s) IV Push every 6 hours PRN nausea      I&O's Summary    12 Nov 2024 07:01  -  13 Nov 2024 07:00  --------------------------------------------------------  IN: 240 mL / OUT: 300 mL / NET: -60 mL        acetaminophen     Tablet .. 650 milliGRAM(s) Oral every 6 hours PRN  aluminum hydroxide/magnesium hydroxide/simethicone Suspension 30 milliLiter(s) Oral every 4 hours PRN  bisacodyl Suppository 10 milliGRAM(s) Rectal once  carvedilol 6.25 milliGRAM(s) Oral every 12 hours  cefTRIAXone Injectable. 1000 milliGRAM(s) IV Push every 24 hours  glycerin Suppository - Adult 1 Suppository(s) Rectal at bedtime  melatonin 3 milliGRAM(s) Oral at bedtime PRN  metoclopramide Injectable 5 milliGRAM(s) IV Push every 6 hours PRN  pantoprazole  Injectable 40 milliGRAM(s) IV Push two times a day  rosuvastatin 10 milliGRAM(s) Oral at bedtime  senna 2 Tablet(s) Oral at bedtime  spironolactone 25 milliGRAM(s) Oral daily  sucralfate 1 Gram(s) Oral four times a day  sucralfate suspension 1 Gram(s) Oral every 6 hours  tamsulosin 0.4 milliGRAM(s) Oral at bedtime  timolol 0.5% Solution 1 Drop(s) Both EYES at bedtime      PHYSICAL EXAM:  Vital Signs Last 24 Hrs  T(C): 36.9 (13 Nov 2024 08:29), Max: 37.3 (12 Nov 2024 20:28)  T(F): 98.4 (13 Nov 2024 08:29), Max: 99.1 (12 Nov 2024 20:28)  HR: 65 (13 Nov 2024 08:29) (65 - 70)  BP: 125/67 (13 Nov 2024 08:29) (125/67 - 145/58)  BP(mean): --  RR: 18 (13 Nov 2024 08:29) (18 - 18)  SpO2: 92% (13 Nov 2024 08:29) (92% - 95%)    Parameters below as of 13 Nov 2024 08:29  Patient On (Oxygen Delivery Method): room air          CONSTITUTIONAL: no apparent distress  EYES: PERRLA  ENMT: Oral mucosa with moist membranes  RESP: No respiratory distress, clear to auscultation bilaterally, no wheezes or rales  CV: RRR, +S1S2, +1 peripheral edema  GI: Soft, NT, ND, no rebound, no guarding  PSYCH: A+O x 3, mood and affect appropriate  NEURO: Cooperative, upper and lower motor function grossly intact bilaterally, sensation grossly intact throughout      LABS:                        9.1    6.27  )-----------( 92       ( 13 Nov 2024 05:14 )             26.9     11-13    138  |  108  |  14.3  ----------------------------<  109[H]  3.5   |  21.0[L]  |  0.88    Ca    7.8[L]      13 Nov 2024 05:14      PT/INR - ( 11 Nov 2024 11:30 )   PT: 18.3 sec;   INR: 1.58 ratio         PTT - ( 11 Nov 2024 11:30 )  PTT:36.2 sec      Urinalysis Basic - ( 13 Nov 2024 05:14 )    Color: x / Appearance: x / SG: x / pH: x  Gluc: 109 mg/dL / Ketone: x  / Bili: x / Urobili: x   Blood: x / Protein: x / Nitrite: x   Leuk Esterase: x / RBC: x / WBC x   Sq Epi: x / Non Sq Epi: x / Bacteria: x        CAPILLARY BLOOD GLUCOSE          IMAGING:

## 2024-11-13 NOTE — PROGRESS NOTE ADULT - SUBJECTIVE AND OBJECTIVE BOX
This is an 78 yo F who follows Dr Foster/JAEL for a history of VICTORIA S/P IV iron  last Hb in the office on 10/8 was 13.1 Has received IV iron in the past   presented to [facility] on 10/27 with few days of hematemesis and melena and was found to have anemia with Hgb of 7.4, s/p CT A/P at [facility] showing enlarged heterogenous pancreatic head, CBD dilation, duodenal thickening, gastric distention w/ liver hypodensities, all suspicious for neoplasm, being transferred to [facility] for advanced GI and surgical oncology eval    MEDICATIONS  (STANDING):  carvedilol 6.25 milliGRAM(s) Oral every 12 hours  pantoprazole Infusion 8 mG/Hr (10 mL/Hr) IV Continuous <Continuous>  rosuvastatin 10 milliGRAM(s) Oral at bedtime  timolol 0.5% Solution 1 Drop(s) Both EYES at bedtime    MEDICATIONS  (PRN):  acetaminophen     Tablet .. 650 milliGRAM(s) Oral every 6 hours PRN Temp greater or equal to 38C (100.4F), Mild Pain (1 - 3)  melatonin 3 milliGRAM(s) Oral at bedtime PRN Insomnia  ondansetron Injectable 4 milliGRAM(s) IV Push every 6 hours PRN Nausea and/or Vomiting  Vital Signs Last 24 Hrs  T(C): 36.9 (13 Nov 2024 08:29), Max: 37.3 (12 Nov 2024 20:28)  T(F): 98.4 (13 Nov 2024 08:29), Max: 99.1 (12 Nov 2024 20:28)  HR: 65 (13 Nov 2024 08:29) (65 - 70)  BP: 125/67 (13 Nov 2024 08:29) (125/67 - 145/58)  BP(mean): --  RR: 18 (13 Nov 2024 08:29) (18 - 18)  SpO2: 92% (13 Nov 2024 08:29) (92% - 95%)    Parameters below as of 13 Nov 2024 08:29  Patient On (Oxygen Delivery Method): room air      GENERAL:  Well-appearing elderly male, not in acute distress  EYES:  Clear conjunctiva, extraocular movement intact  ENT: Moist mucous membranes  RESP:   lungs clear to ausculation   CV: Regular rate and rhythm  GI: Soft, non-tender, non-distended  NEURO: Awake, alert, conversant   PSYCH: Calm, cooperative  SKIN: No rash or lesions, warm and dry    CBC                          9.1    6.27  )-----------( 92       ( 13 Nov 2024 05:14 )             26.9                             9.2    7.08  )-----------( 94       ( 12 Nov 2024 05:30 )             27.3                             7.5    7.10  )-----------( 95       ( 11 Nov 2024 05:29 )             22.5                           6.5    7.51  )-----------( 72       ( 09 Nov 2024 04:35 )             19.8                           8.8    8.21  )-----------( 84       ( 08 Nov 2024 04:50 )             26.3                   CHEM    11-13    138  |  108  |  14.3  ----------------------------<  109[H]  3.5   |  21.0[L]  |  0.88    Ca    7.8[L]      13 Nov 2024 05:14          11-11    141  |  111[H]  |  23.6[H]  ----------------------------<  107[H]  3.8   |  20.0[L]  |  1.09    Ca    7.7[L]      11 Nov 2024 05:29    TPro  4.1[L]  /  Alb  2.4[L]  /  TBili  0.6  /  DBili  x   /  AST  19  /  ALT  10  /  AlkPhos  48  11-10      11-09    141  |  110[H]  |  54.1[H]  ----------------------------<  105[H]  4.2   |  21.0[L]  |  1.69[H]    Ca    7.5[L]      09 Nov 2024 04:35        11-08    149[H]  |  109[H]  |  34.2[H]  ----------------------------<  147[H]  3.4[L]   |  26.0  |  1.48[H]    Ca    7.9[L]      08 Nov 2024 04:50            11-05    140  |  111[H]  |  19.8  ----------------------------<  108[H]  4.0   |  21.0[L]  |  0.86    Ca    8.0[L]      05 Nov 2024 08:15        11-05    140  |  111[H]  |  19.8  ----------------------------<  108[H]  4.0   |  21.0[L]  |  0.86    Ca    8.0[L]      05 Nov 2024 08:15  Mg     1.8     11-04

## 2024-11-13 NOTE — PROGRESS NOTE ADULT - ASSESSMENT
79M with a history of coronary artery disease, gastroesophageal reflux, pancreatic mass, and glaucoma who presented to [facility] with hematemesis and melena found to have anemia requiring transfusion of packed red blood cells. CT of the abdomen was notable for an enlarged heterogenous pancreatic head, common bile duct dilation, duodenal thickening, gastric distention, and liver hypodensities. The patient was transfered to [facility] for evaluation and underwent endoscopy with findings of a duodenal mass. Packed red blood cells were transfused for recurrent anemia and the patient underwent radiation therapy for tumor bleeding.    Acute blood loss anemia  - Endoscopy (11/1) noted old blood, ulcerated mucosa in the duodenal bulb, and duodenal mass  - s/p radiation on 11/7  - hgb stable today  - s/p retacrit 40K x1 11/11  - c/w PPI BID  - [place] [facility] reached out to for follow up who discussed with rad onc, may take 5-7 days to see improvement in bleeding following radiation  - trend CBC     Inability to tolerate PO intake  - 2/2 gastric outlet obstruction from mass  - s/p stent with GI 11/11  - advance to full liquid diet   - surgery following, if no improvement after stent placement may be candidate for palliative surgery    Thrombocytopenia  - stable  - heme/onc following, f/u lab results  - Monitoring platelet counts    Hypoxia 2/2 PNA  - Improved, currently on RA  - Afebrile, WBC wnl  - CXR without congestion but RLL infiltrate  - procal elevated, c/w abx for total 5 days    Acute kidney injury - resolved   - possibly 2/2 IV lasix  - s/p intravenous fluids  - Continue to monitor renal functions    Pancreatic / Duodenal mass  - Prior MRI(10/29) noted few small poorly delineated hepatic lesions, likely metastases, 4.5 x 3.5 cm infiltrative pancreatic head mass invading the proximal duodenum, interruption of the intrapancreatic portion of the common bile and main pancreatic ducts at the level of the mass with only minimal upstream ductal dilatation  - CA 19-9 level elevated  - s/p radiation 11/7  - Biopsy results noted adenocarcinoma, pt aware  - Oncology following. planning for outpatient treatment once stable for d/c    Coronary artery disease / Hypertension  - SBP has been borderline 100s  - d/c amlodipine  - c/w low dose coreg  - Aspirin on hold due to bleeding and anemia  - resume amlodipine  - inc coreg back to 6.25mg BID  - Lisinopril and spironolactone were on hold for CJ  - will resume spironolactone given LE edema from fluids    Glaucoma  - On timolol eye drops      Dispo: currently active pending stabilization of hgb, improvement in PO status, anticipate 1-2 days more

## 2024-11-13 NOTE — PROGRESS NOTE ADULT - ASSESSMENT
78 yo M with a history of CAD, GERD, pancreatic mass, and glaucoma admitted with anemia, found to have an enlarged heterogenous pancreatic head, common bile duct dilation, duodenal thickening, gastric distention, and liver hypodensity on imaging.     #Pancreatic adenocarcinoma  #Duodenal mass  - MRI (10/29) noted few small poorly delineated hepatic lesions, likely metastases, 4.5 x 3.5 cm infiltrative pancreatic head mass invading the proximal duodenum, interruption of the intrapancreatic portion of the common bile and main pancreatic ducts at the level of the mass with only minimal upstream ductal dilatation  - Elevated CA 19-9  - S/p EGD/EUS, showed duodenal mass, s/p biopsy. Pathology is positive for malignant cells + Adenocarcinoma.   - Seen by oncology, now s/p radiation x sessions  - Now s/p EGD with duodenal stent placement on 11/11/2024.   - Tolerating CLD, advanced to full liquid diet today  -Continue Protonix 40 mg BID  - Carafate 1 gm QID  - Further care per primary team

## 2024-11-13 NOTE — CHART NOTE - NSCHARTNOTEFT_GEN_A_CORE
Source: Patient [ ]  Family [ ]   other [x] EMR    Current Diet: Diet, Full Liquid:   Supplement Feeding Modality:  Oral  Ensure Plus High Protein Cans or Servings Per Day:  2       Frequency:  Two Times a day (11-13-24 @ 09:35)    PO intake:  < 50% [x]   50-75%  [ ]   %  [ ]  other :    Source for PO intake [ ] Patient [ ] family [x] chart [ ] staff [ ] other    Current Weight:   11/13 65.8 kg  11/9 66.1 kg  11/6 65.5 kg  10/30 58.0 kg  +1 L, R leg edema per doucmentation likely influencing uptrend of weights vs bed scales    Pertinent Medications: MEDICATIONS  (STANDING):  bisacodyl Suppository 10 milliGRAM(s) Rectal once  cefTRIAXone Injectable. 1000 milliGRAM(s) IV Push every 24 hours  glycerin Suppository - Adult 1 Suppository(s) Rectal at bedtime  pantoprazole  Injectable 40 milliGRAM(s) IV Push two times a day  senna 2 Tablet(s) Oral at bedtime  spironolactone 25 milliGRAM(s) Oral daily  sucralfate suspension 1 Gram(s) Oral every 6 hours    MEDICATIONS  (PRN):  metoclopramide Injectable 5 milliGRAM(s) IV Push every 6 hours PRN nausea    Pertinent Labs:11-13 Na138 mmol/L Glu 109 mg/dL[H] K+ 3.5 mmol/L Cr  0.88 mg/dL BUN 14.3 mg/dL    Skin: no breakdown documented     Nutrition focused physical exam conducted previously - found signs of malnutrition [ ]absent [x]present    Subcutaneous fat loss: [x] Orbital fat pads region, [x]Buccal fat region, [ ]Triceps region,  [ ]Ribs region    Muscle wasting: [x]Temples region, [x]Clavicle region, [x]Shoulder region, [ ]Scapula region, [ ]Interosseous region,  [ ]thigh region, [ ]Calf region    Estimated Needs:   3944-1685 kcals/day (based on 30-35 kcal/kg BW 58 kg)  81-93 g pro/day (based on 1.4-1.6 g/kg BW 58 kg)    Clinical Course: 79M with a history of coronary artery disease, gastroesophageal reflux, pancreatic mass, and glaucoma who presented to [facility] with hematemesis and melena found to have anemia requiring transfusion of packed red blood cells. CT of the abdomen was notable for an enlarged heterogenous pancreatic head, common bile duct dilation, duodenal thickening, gastric distention, and liver hypodensities. The patient was transferred to [facility] for evaluation and underwent endoscopy with findings of a duodenal mass. Packed red blood cells were transfused for recurrent anemia and the patient underwent radiation therapy for tumor bleeding.    Current Nutrition Diagnosis: Pt has been identified with protein-calorie malnutrition (chronic, moderate) related to inability to meet sufficient energy/protein intake in setting of suspected neoplasm as evidenced by likely meeting < 75% est needs > 1 mo, mod-sev muscle/fat wasting, 5% wt loss x 3 mo.   Chart and events reviewed. Pt has been intermittently NPO/clear liquids since admission secondary to clinical course. Tolerated clears and was advanced to full liquids today with addition of ensure plus hp bid to supplement. Last documented BM yesterday. Recommendations below, RD remains available:    Recommendations:   1. Continue gradual diet advancement as tolerated (consider low fat/low fiber diet when advanced further for optimal GI tolerance)  2. Continue Ensure Plus HP BID (350 kcals, 20 g pro each) to supplement  3. Provide Magic Cup BID (290 kcals, 9 g pro each) for additional nourishment  4. Rx: MVI daily to optimize nutrition status  5. Monitor BM trends    Monitoring and Evaluation:   [x] PO intake [x] Tolerance to diet prescription [X] Weights  [X] Follow up per protocol [X] Labs: chem 8, phos, mg

## 2024-11-13 NOTE — PROGRESS NOTE ADULT - NS ATTEND AMEND GEN_ALL_CORE FT
I evaluated this pt. with my ACP and agree with the above assessment and management plan. For EGD with duodenal stent placement tomorrow. Report to follow. I reviewed his imaging studies here and agree with the radiologist's interpretations.
Ms. Behrle is a 79 year old woman with pancreatic adenocarcinoma complicated by infiltration into the duodenum further complicated by gastric outlet obstruction s/p EGD with stent placement 11/11/2024, now tolerating full liquid diet. No further endoscopic intervention planned at this time. We will continue to follow along peripherally.
Patient seen and examined.  Patient with continuous drop in the blood count with no evidence of any large-volume bleeding or hemodynamic instability.  At this time most likely he has bleeding from the duodenal tumor.  After extensive discussion with the surgical oncology, attending hospitalist and interventional radiology, we will order a CT angio to rule out any source of bleeding which can be embolized.  Otherwise the patient will most likely need radiation.  After the CT abdominal results are reviewed, revealed discussed with the surgical oncology team.  Explained to the management plan to the patient.
80 yo M with PMH CAD, GERD, panc mass who presented to OSH with N/V who was transferred due to concern for pancreatic malignancy. EGD/EUS wit [facility] completed last week. Patient feeling well and tolerating sips of liquids at times without further vomiting. Awaiting radiation oncology for possible radiation prior to duodenal stent placement. Continue IV PPI BID. Following radiation, will consider duodenal stent placement.   Discussed with patient at bedside.
Duodenal lesion     pancreas looked normal  FNA pending  clear liquid diet ordered  will order CBC for today and tomorrow. Hgb 8 yesterday, but duodenal lesion was friable and oozing  On monday , Dr Robles will speak to Surg onc regarding the plan- Surgery , vs, radiation, ? duodenal stenting      Constipation will give suppository and colace. If no better then will do miralax

## 2024-11-13 NOTE — PROGRESS NOTE ADULT - ASSESSMENT
This is an 78 yo F who follows Dr Foster/JAEL for a history of VICTORIA S/P IV iron  last Hb in the office on 10/8 was 13.1 Has received IV iron in the past   presented to [facility] on 10/27 with few days of hematemesis and melena and was found to have anemia with Hgb of 7.4, s/p CT A/P at [facility] showing enlarged heterogenous pancreatic head, CBD dilation, duodenal thickening, gastric distention w/ liver hypodensities, all suspicious for neoplasm, being transferred to [facility] for advanced GI and surgical oncology eval    Acute blood loss anemia due to GI bleeding from duodenal ulcer  - follows Dr Foster/JAEL  - History of VICTORIA -S/P IV iron    - last Hb in the office on 10/8 was 13.1    - EGD on 10/28 at [facility] showed single ulcer at duodenal bulb    - hgb stable at 9.1 today    Pancreatic mass  - CT A/P at [facility] showing enlarged heterogenous pancreatic head, CBD dilation, duodenal thickening, gastric distention w/ liver hypodensities, all suspicious for neoplasm,  - Ca 19-9 markedly elevated [id number]  - MRI abdomen completed at [facility] Technically limited study. Findings highly likely to represent metastatic pancreatic cancer, infiltrative primary pancreatic head neoplasm invading the duodenum and small presumed hepatic metastases.  -- S/p EGD/ EUS, showed duodenal mass,- Pathology is positive for malignant cells + Adeno  - Evaluated by Rad onc-  Causing GI bleeding, requiring multiple transfusions.  Has received 7u PRBC thus far.    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

## 2024-11-14 ENCOUNTER — TRANSCRIPTION ENCOUNTER (OUTPATIENT)
Age: 79
End: 2024-11-14

## 2024-11-14 VITALS
RESPIRATION RATE: 18 BRPM | SYSTOLIC BLOOD PRESSURE: 148 MMHG | TEMPERATURE: 99 F | DIASTOLIC BLOOD PRESSURE: 73 MMHG | OXYGEN SATURATION: 95 % | HEART RATE: 74 BPM

## 2024-11-14 LAB
ACANTHOCYTES BLD QL SMEAR: SLIGHT — SIGNIFICANT CHANGE UP
BASOPHILS # BLD AUTO: 0 K/UL — SIGNIFICANT CHANGE UP (ref 0–0.2)
BASOPHILS NFR BLD AUTO: 0 % — SIGNIFICANT CHANGE UP (ref 0–2)
DACRYOCYTES BLD QL SMEAR: SLIGHT — SIGNIFICANT CHANGE UP
EOSINOPHIL # BLD AUTO: 0 K/UL — SIGNIFICANT CHANGE UP (ref 0–0.5)
EOSINOPHIL NFR BLD AUTO: 0 % — SIGNIFICANT CHANGE UP (ref 0–6)
GIANT PLATELETS BLD QL SMEAR: PRESENT — SIGNIFICANT CHANGE UP
HCT VFR BLD CALC: 27 % — LOW (ref 39–50)
HGB BLD-MCNC: 8.9 G/DL — LOW (ref 13–17)
LYMPHOCYTES # BLD AUTO: 0.14 K/UL — LOW (ref 1–3.3)
LYMPHOCYTES # BLD AUTO: 2.7 % — LOW (ref 13–44)
MANUAL SMEAR VERIFICATION: SIGNIFICANT CHANGE UP
MCHC RBC-ENTMCNC: 30.5 PG — SIGNIFICANT CHANGE UP (ref 27–34)
MCHC RBC-ENTMCNC: 33 G/DL — SIGNIFICANT CHANGE UP (ref 32–36)
MCV RBC AUTO: 92.5 FL — SIGNIFICANT CHANGE UP (ref 80–100)
MICROCYTES BLD QL: SLIGHT — SIGNIFICANT CHANGE UP
MONOCYTES # BLD AUTO: 0.14 K/UL — SIGNIFICANT CHANGE UP (ref 0–0.9)
MONOCYTES NFR BLD AUTO: 2.7 % — SIGNIFICANT CHANGE UP (ref 2–14)
NEUTROPHILS # BLD AUTO: 4.85 K/UL — SIGNIFICANT CHANGE UP (ref 1.8–7.4)
NEUTROPHILS NFR BLD AUTO: 94.6 % — HIGH (ref 43–77)
OVALOCYTES BLD QL SMEAR: SLIGHT — SIGNIFICANT CHANGE UP
PLAT MORPH BLD: NORMAL — SIGNIFICANT CHANGE UP
PLATELET # BLD AUTO: 90 K/UL — LOW (ref 150–400)
POLYCHROMASIA BLD QL SMEAR: SLIGHT — SIGNIFICANT CHANGE UP
RBC # BLD: 2.92 M/UL — LOW (ref 4.2–5.8)
RBC # FLD: 14.5 % — SIGNIFICANT CHANGE UP (ref 10.3–14.5)
RBC BLD AUTO: ABNORMAL
SMUDGE CELLS # BLD: PRESENT — SIGNIFICANT CHANGE UP
WBC # BLD: 5.13 K/UL — SIGNIFICANT CHANGE UP (ref 3.8–10.5)
WBC # FLD AUTO: 5.13 K/UL — SIGNIFICANT CHANGE UP (ref 3.8–10.5)

## 2024-11-14 PROCEDURE — 99232 SBSQ HOSP IP/OBS MODERATE 35: CPT

## 2024-11-14 PROCEDURE — 93971 EXTREMITY STUDY: CPT | Mod: 26,RT

## 2024-11-14 RX ORDER — SPIRONOLACTONE 100 MG
1 TABLET ORAL
Qty: 30 | Refills: 0
Start: 2024-11-14 | End: 2024-12-13

## 2024-11-14 RX ORDER — PANTOPRAZOLE SODIUM 40 MG/1
1 TABLET, DELAYED RELEASE ORAL
Qty: 60 | Refills: 0
Start: 2024-11-14 | End: 2024-12-13

## 2024-11-14 RX ORDER — TAMSULOSIN HCL 0.4 MG
1 CAPSULE ORAL
Qty: 30 | Refills: 0
Start: 2024-11-14 | End: 2024-12-13

## 2024-11-14 RX ORDER — PANTOPRAZOLE SODIUM 40 MG/1
0 TABLET, DELAYED RELEASE ORAL
Refills: 0 | DISCHARGE

## 2024-11-14 RX ORDER — SUCRALFATE 1 G/10ML
1 SUSPENSION ORAL
Qty: 120 | Refills: 0
Start: 2024-11-14 | End: 2024-12-13

## 2024-11-14 RX ADMIN — SUCRALFATE 1 GRAM(S): 1 SUSPENSION ORAL at 13:24

## 2024-11-14 RX ADMIN — SUCRALFATE 1 GRAM(S): 1 SUSPENSION ORAL at 01:29

## 2024-11-14 RX ADMIN — SUCRALFATE 1 GRAM(S): 1 SUSPENSION ORAL at 06:21

## 2024-11-14 RX ADMIN — CARVEDILOL 6.25 MILLIGRAM(S): 25 TABLET, FILM COATED ORAL at 06:21

## 2024-11-14 RX ADMIN — SUCRALFATE 1 GRAM(S): 1 SUSPENSION ORAL at 18:41

## 2024-11-14 RX ADMIN — PANTOPRAZOLE SODIUM 40 MILLIGRAM(S): 40 TABLET, DELAYED RELEASE ORAL at 06:21

## 2024-11-14 RX ADMIN — Medication 25 MILLIGRAM(S): at 06:22

## 2024-11-14 RX ADMIN — CARVEDILOL 6.25 MILLIGRAM(S): 25 TABLET, FILM COATED ORAL at 18:41

## 2024-11-14 RX ADMIN — Medication 1000 MILLIGRAM(S): at 10:07

## 2024-11-14 NOTE — DISCHARGE NOTE NURSING/CASE MANAGEMENT/SOCIAL WORK - PATIENT PORTAL LINK FT
You can access the FollowMyHealth Patient Portal offered by [facility] by registering at the following website: [email or web address]. By joining [facility]’s FollowMyHealth portal, you will also be able to view your health information using other applications (apps) compatible with our system.

## 2024-11-14 NOTE — DISCHARGE NOTE PROVIDER - NSDCMRMEDTOKEN_GEN_ALL_CORE_FT
carvedilol 6.25 mg oral tablet: 1 tab(s) orally 2 times a day  dicyclomine 10 mg oral capsule: 1 cap(s) orally 2 times a day  pantoprazole 0.8 mg/mL-NaCl 0.9% intravenous solution: intravenous every minute  rosuvastatin 10 mg oral tablet: 1 tab(s) orally once a day (at bedtime)  timolol hemihydrate 0.5% ophthalmic solution: 1 drop(s) in each affected eye once a day (at bedtime)   Carafate 1 g oral tablet: 1 tab(s) orally 4 times a day  carvedilol 6.25 mg oral tablet: 1 tab(s) orally 2 times a day  dicyclomine 10 mg oral capsule: 1 cap(s) orally 2 times a day  pantoprazole 40 mg oral delayed release tablet: 1 tab(s) orally 2 times a day  rosuvastatin 10 mg oral tablet: 1 tab(s) orally once a day (at bedtime)  spironolactone 25 mg oral tablet: 1 tab(s) orally once a day  tamsulosin 0.4 mg oral capsule: 1 cap(s) orally once a day (at bedtime)  timolol hemihydrate 0.5% ophthalmic solution: 1 drop(s) in each affected eye once a day (at bedtime)

## 2024-11-14 NOTE — PROGRESS NOTE ADULT - SUBJECTIVE AND OBJECTIVE BOX
This is an 80 yo F who follows Dr Foster/JAEL for a history of VICTORIA S/P IV iron  last Hb in the office on 10/8 was 13.1 Has received IV iron in the past   presented to [facility] on 10/27 with few days of hematemesis and melena and was found to have anemia with Hgb of 7.4, s/p CT A/P at [facility] showing enlarged heterogenous pancreatic head, CBD dilation, duodenal thickening, gastric distention w/ liver hypodensities, all suspicious for neoplasm, being transferred to [facility] for advanced GI and surgical oncology eval    MEDICATIONS  (STANDING):  carvedilol 6.25 milliGRAM(s) Oral every 12 hours  pantoprazole Infusion 8 mG/Hr (10 mL/Hr) IV Continuous <Continuous>  rosuvastatin 10 milliGRAM(s) Oral at bedtime  timolol 0.5% Solution 1 Drop(s) Both EYES at bedtime    MEDICATIONS  (PRN):  acetaminophen     Tablet .. 650 milliGRAM(s) Oral every 6 hours PRN Temp greater or equal to 38C (100.4F), Mild Pain (1 - 3)  melatonin 3 milliGRAM(s) Oral at bedtime PRN Insomnia  ondansetron Injectable 4 milliGRAM(s) IV Push every 6 hours PRN Nausea and/or Vomiting    Vital Signs Last 24 Hrs  T(C): 36.7 (14 Nov 2024 04:50), Max: 37.4 (13 Nov 2024 20:08)  T(F): 98.1 (14 Nov 2024 04:50), Max: 99.4 (13 Nov 2024 20:08)  HR: 75 (14 Nov 2024 04:50) (70 - 90)  BP: 138/63 (14 Nov 2024 04:50) (110/67 - 154/72)  BP(mean): --  RR: 18 (14 Nov 2024 04:50) (18 - 18)  SpO2: 94% (14 Nov 2024 04:50) (93% - 96%)    Parameters below as of 14 Nov 2024 04:50  Patient On (Oxygen Delivery Method): room air      GENERAL:  Well-appearing elderly male, not in acute distress  EYES:  Clear conjunctiva, extraocular movement intact  ENT: Moist mucous membranes  RESP:   lungs clear to ausculation   CV: Regular rate and rhythm  GI: Soft, non-tender, non-distended  NEURO: Awake, alert, conversant   PSYCH: Calm, cooperative  SKIN: No rash or lesions, warm and dry    CBC                          8.9    5.13  )-----------( 90       ( 14 Nov 2024 05:24 )             27.0                             9.1    6.27  )-----------( 92       ( 13 Nov 2024 05:14 )             26.9                             9.2    7.08  )-----------( 94       ( 12 Nov 2024 05:30 )             27.3                             7.5    7.10  )-----------( 95       ( 11 Nov 2024 05:29 )             22.5         CHEM      11-13    138  |  108  |  14.3  ----------------------------<  109[H]  3.5   |  21.0[L]  |  0.88    Ca    7.8[L]      13 Nov 2024 05:14      11-11    141  |  111[H]  |  23.6[H]  ----------------------------<  107[H]  3.8   |  20.0[L]  |  1.09    Ca    7.7[L]      11 Nov 2024 05:29    TPro  4.1[L]  /  Alb  2.4[L]  /  TBili  0.6  /  DBili  x   /  AST  19  /  ALT  10  /  AlkPhos  48  11-10      11-09    141  |  110[H]  |  54.1[H]  ----------------------------<  105[H]  4.2   |  21.0[L]  |  1.69[H]    Ca    7.5[L]      09 Nov 2024 04:35        11-08    149[H]  |  109[H]  |  34.2[H]  ----------------------------<  147[H]  3.4[L]   |  26.0  |  1.48[H]    Ca    7.9[L]      08 Nov 2024 04:50

## 2024-11-14 NOTE — DISCHARGE NOTE PROVIDER - NSDCCPCAREPLAN_GEN_ALL_CORE_FT
PRINCIPAL DISCHARGE DIAGNOSIS  Diagnosis: Anemia  Assessment and Plan of Treatment: Take protonix and carafate as prescribed and follow up with GI and hematology in 1 - 2 weeks of discharge. If you develop shortness of breath with walk please go to nearest ED for evaluation       PRINCIPAL DISCHARGE DIAGNOSIS  Diagnosis: Anemia  Assessment and Plan of Treatment: Take protonix and carafate as prescribed and follow up with GI and hematology in 1 - 2 weeks of discharge. If you develop shortness of breath with walk please go to nearest ED for evaluation.  Continue full liquid diet and slowly advance yourself to a low fiber regular diet.

## 2024-11-14 NOTE — DISCHARGE NOTE PROVIDER - CARE PROVIDERS DIRECT ADDRESSES
,[email or web address]

## 2024-11-14 NOTE — DISCHARGE NOTE NURSING/CASE MANAGEMENT/SOCIAL WORK - NSCORESITESY/N_GEN_A_CORE_RD
Repetitive lingual movements with reduced lingual control resulting in premature spillage of liquids into the hypopharynx. No

## 2024-11-14 NOTE — DISCHARGE NOTE PROVIDER - HOSPITAL COURSE
79yoM hx CAD, HTN, glaucoma who presented to [facility] on [place]27 with few days of hematemesis and melena and was found to have anemia with Hgb of 7.4. CT A/P at [facility] showed enlarged heterogenous pancreatic head, CBD dilation, duodenal thickening, gastric distention w/ liver hypodensities, all suspicious for neoplasm.  Pt received 2pRBC was started on PPI drip and ASA was held.  He was also found to have CJ, hyponatremia and his spironolactone and lisinopril was held.  Pt seen by GI, underwent EGD on 10/28 that showed duodenal bulb ulcer.  Decision was made to transfer pt to [facility] for advanced GI evaluation as pt thought to benefit from possible duodenal stent and also for surgical oncology evaluation. Pt made NPO and underwent duodenal stent on 11/11 without complication. Diet advanced to full liquid and pt tolerated it well. Per GI recs diet is to be continued in this way for *** days. Hgb trended thr   79yoM hx CAD, HTN, glaucoma who [place] [facility] [place] with few days of hematemesis and melena and was found to have anemia with Hgb of 7.4. CT [place] [facility] [place] enlarged heterogenous pancreatic head, CBD dilation, duodenal thickening, gastric distention w/ liver hypodensities, all suspicious for neoplasm.  Pt received 2pRBC was started on PPI drip and ASA was held.  He was also found to have CJ, hyponatremia and his spironolactone and lisinopril was held.  Pt seen by GI, underwent EGD on 10/28 that showed duodenal bulb ulcer.  Decision was made to transfer pt to [facility] for advanced GI evaluation as pt thought to benefit from possible duodenal stent and also for surgical oncology evaluation. Pt made NPO and underwent duodenal stent on 11/11 without complication. Diet advanced to full liquid and pt tolerated it well. Per GI recs diet is to be continued in this way for *** days. Hgb trended throughout stay and transfused several times for low hgb. Pt had radiation therapy on 11/7 with heme/onc follow up and hgb eventually stabilized. Pt never had overt signs of bleeding. He is currently medically optimized for discharge with appropriate follow ups.    79yoM hx CAD, HTN, glaucoma who presented to [facility] on 10/27 with few days of hematemesis and melena and was found to have anemia with Hgb of 7.4. CT A/P at [facility] showed enlarged heterogenous pancreatic head, CBD dilation, duodenal thickening, gastric distention w/ liver hypodensities, all suspicious for neoplasm.  Pt received 2pRBC was started on PPI drip and ASA was held.  He was also found to have CJ, hyponatremia and his spironolactone and lisinopril was held.  Pt seen by GI, underwent EGD on 10/28 that showed duodenal bulb ulcer.  Decision was made to transfer pt to [facility] for advanced GI evaluation as pt thought to benefit from possible duodenal stent and also for surgical oncology evaluation. Pt made NPO and underwent duodenal stent on 11/11 without complication. Diet advanced to full liquid and pt tolerated it well. Per GI recs diet is to be continued in this way and gradually advance himself to low fiber diet. Hgb trended throughout stay and transfused several times for low hgb. Pt had radiation therapy on 11/7 with heme/onc follow up and hgb eventually stabilized. Pt never had overt signs of bleeding. He is currently medically optimized for discharge with appropriate follow ups.

## 2024-11-14 NOTE — PROGRESS NOTE ADULT - ASSESSMENT
This is an 80 yo F who follows Dr Foster/JAEL for a history of VICTORIA S/P IV iron  last Hb in the office on 10/8 was 13.1 Has received IV iron in the past   presented to [facility] on [place]27 with few days of hematemesis and melena and was found to have anemia with Hgb of 7.4, s/p CT A/P at [facility] showing enlarged heterogenous pancreatic head, CBD dilation, duodenal thickening, gastric distention w/ liver hypodensities, all suspicious for neoplasm, being transferred to [facility] for advanced GI and surgical oncology eval    Acute blood loss anemia due to GI bleeding from duodenal ulcer  - follows Dr Foster/JAEL  - History of VICTORIA -S/P IV iron    - last Hb in the office on 10/8 was 13.1    - EGD on 10/28 at [facility] showed single ulcer at duodenal bulb   - hgb stable at 8.9  today    Pancreatic mass  - CT A/P at [facility] showing enlarged heterogenous pancreatic head, CBD dilation, duodenal thickening, gastric distention w/ liver hypodensities, all suspicious for neoplasm,  - Ca 19-9 markedly elevated 4903K  - MRI abdomen completed at [facility] Technically limited study. Findings highly likely to represent metastatic pancreatic cancer, infiltrative primary pancreatic head neoplasm invading the duodenum and small presumed hepatic metastases.  -- S/p EGD/ EUS, showed duodenal mass,- Pathology is positive for malignant cells + Adeno  - Evaluated by Rad onc-  Causing GI bleeding, requiring multiple transfusions.  Has received 7u PRBC thus far.    -S/P SIM 11/6   - S/P RT 11/7  - GI following-  S/p EGD with duodenal stent placement now tolerating full liquid diet. No further endoscopic intervention planned at this time.  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

## 2024-11-14 NOTE — DISCHARGE NOTE PROVIDER - CARE PROVIDER_API CALL
Nakul Mccurdy  Gastroenterology  [address], [address]  [place] [address]  Phone: [phone]  Fax: [phone]  Follow Up Time: 1 week    Arpan Quispe)  Medical Oncology  [address] [place] [address]  Phone: [phone]  Fax: [phone]  Follow Up Time: 1 week

## 2024-11-14 NOTE — PROGRESS NOTE ADULT - NUTRITIONAL ASSESSMENT
This patient has been assessed with a concern for Malnutrition and has been determined to have a diagnosis/diagnoses of Moderate protein-calorie malnutrition.    This patient is being managed with:   Diet Clear Liquid-  DASH/TLC {Sodium & Cholesterol Restricted} (DASH)  Supplement Feeding Modality:  Oral  Ensure Clear Cans or Servings Per Day:  3       Frequency:  Three Times a day  Entered: Oct 30 2024  8:52AM  
This patient has been assessed with a concern for Malnutrition and has been determined to have a diagnosis/diagnoses of Moderate protein-calorie malnutrition.    This patient is being managed with:   Diet Clear Liquid-  Entered: Nov 10 2024 10:34AM    Diet NPO after Midnight-     NPO Start Date: 10-Nov-2024   NPO Start Time: 23:59  Entered: Nov 10 2024 10:34AM  
This patient has been assessed with a concern for Malnutrition and has been determined to have a diagnosis/diagnoses of Moderate protein-calorie malnutrition.    This patient is being managed with:   Diet NPO-  Except Medications  With Ice Chips/Sips of Water  Entered: Nov 4 2024  9:09AM  
This patient has been assessed with a concern for Malnutrition and has been determined to have a diagnosis/diagnoses of Moderate protein-calorie malnutrition.    This patient is being managed with:   Diet NPO-  Except Medications  With Ice Chips/Sips of Water  Entered: Nov 4 2024  9:09AM  
This patient has been assessed with a concern for Malnutrition and has been determined to have a diagnosis/diagnoses of Moderate protein-calorie malnutrition.    This patient is being managed with:   Diet Clear Liquid-  DASH/TLC {Sodium & Cholesterol Restricted} (DASH)  Supplement Feeding Modality:  Oral  Ensure Clear Cans or Servings Per Day:  3       Frequency:  Three Times a day  Entered: Oct 30 2024  8:52AM  
This patient has been assessed with a concern for Malnutrition and has been determined to have a diagnosis/diagnoses of Moderate protein-calorie malnutrition.    This patient is being managed with:   Diet Clear Liquid-  Entered: Nov 10 2024 10:34AM    Diet NPO after Midnight-     NPO Start Date: 10-Nov-2024   NPO Start Time: 23:59  Entered: Nov 10 2024 10:34AM  
This patient has been assessed with a concern for Malnutrition and has been determined to have a diagnosis/diagnoses of Moderate protein-calorie malnutrition.    This patient is being managed with:   Diet Full Liquid-  Supplement Feeding Modality:  Oral  Ensure Plus High Protein Cans or Servings Per Day:  2       Frequency:  Two Times a day  Entered: Nov 13 2024  9:35AM  
This patient has been assessed with a concern for Malnutrition and has been determined to have a diagnosis/diagnoses of Moderate protein-calorie malnutrition.    This patient is being managed with:   Diet NPO-  Except Medications  With Ice Chips/Sips of Water  Entered: Nov 4 2024  9:09AM  
This patient has been assessed with a concern for Malnutrition and has been determined to have a diagnosis/diagnoses of Moderate protein-calorie malnutrition.    This patient is being managed with:   Diet NPO-  Except Medications  With Ice Chips/Sips of Water  Entered: Nov 4 2024  9:09AM  
This patient has been assessed with a concern for Malnutrition and has been determined to have a diagnosis/diagnoses of Moderate protein-calorie malnutrition.    This patient is being managed with:   Diet Clear Liquid-  DASH/TLC {Sodium & Cholesterol Restricted} (DASH)  Supplement Feeding Modality:  Oral  Ensure Clear Cans or Servings Per Day:  3       Frequency:  Three Times a day  Entered: Oct 30 2024  8:52AM  
This patient has been assessed with a concern for Malnutrition and has been determined to have a diagnosis/diagnoses of Moderate protein-calorie malnutrition.    This patient is being managed with:   Diet NPO-  Except Medications  With Ice Chips/Sips of Water  Entered: Nov 4 2024  9:09AM  
This patient has been assessed with a concern for Malnutrition and has been determined to have a diagnosis/diagnoses of Moderate protein-calorie malnutrition.    This patient is being managed with:   Diet NPO-  Except Medications  With Ice Chips/Sips of Water  Entered: Nov 4 2024  9:09AM  
This patient has been assessed with a concern for Malnutrition and has been determined to have a diagnosis/diagnoses of Moderate protein-calorie malnutrition.    This patient is being managed with:   Diet Clear Liquid-  DASH/TLC {Sodium & Cholesterol Restricted} (DASH)  Supplement Feeding Modality:  Oral  Ensure Clear Cans or Servings Per Day:  3       Frequency:  Three Times a day  Entered: Oct 30 2024  8:52AM  
This patient has been assessed with a concern for Malnutrition and has been determined to have a diagnosis/diagnoses of Moderate protein-calorie malnutrition.    This patient is being managed with:   Diet Clear Liquid-  Entered: Nov 10 2024 10:34AM    Diet NPO after Midnight-     NPO Start Date: 10-Nov-2024   NPO Start Time: 23:59  Entered: Nov 10 2024 10:34AM  
This patient has been assessed with a concern for Malnutrition and has been determined to have a diagnosis/diagnoses of Moderate protein-calorie malnutrition.    This patient is being managed with:   Diet NPO after Midnight-     NPO Start Date: 31-Oct-2024   NPO Start Time: 23:59  Entered: Oct 31 2024  2:29PM    Diet Clear Liquid-  DASH/TLC {Sodium & Cholesterol Restricted} (DASH)  Supplement Feeding Modality:  Oral  Ensure Clear Cans or Servings Per Day:  3       Frequency:  Three Times a day  Entered: Oct 30 2024  8:52AM  
This patient has been assessed with a concern for Malnutrition and has been determined to have a diagnosis/diagnoses of Moderate protein-calorie malnutrition.    This patient is being managed with:   Diet NPO-  Except Medications  With Ice Chips/Sips of Water  Entered: Nov 4 2024  9:09AM  
This patient has been assessed with a concern for Malnutrition and has been determined to have a diagnosis/diagnoses of Moderate protein-calorie malnutrition.    This patient is being managed with:   Diet Full Liquid-  Supplement Feeding Modality:  Oral  Ensure Plus High Protein Cans or Servings Per Day:  2       Frequency:  Two Times a day  Entered: Nov 13 2024  9:35AM  
This patient has been assessed with a concern for Malnutrition and has been determined to have a diagnosis/diagnoses of Moderate protein-calorie malnutrition.    This patient is being managed with:   Diet NPO-  Except Medications  With Ice Chips/Sips of Water  Entered: Nov 4 2024  9:09AM  
This patient has been assessed with a concern for Malnutrition and has been determined to have a diagnosis/diagnoses of Moderate protein-calorie malnutrition.    This patient is being managed with:   Diet Clear Liquid-  Entered: Nov 12 2024  7:11AM  
This patient has been assessed with a concern for Malnutrition and has been determined to have a diagnosis/diagnoses of Moderate protein-calorie malnutrition.    This patient is being managed with:   Diet Clear Liquid-  DASH/TLC {Sodium & Cholesterol Restricted} (DASH)  Supplement Feeding Modality:  Oral  Ensure Clear Cans or Servings Per Day:  3       Frequency:  Three Times a day  Entered: Oct 30 2024  8:52AM

## 2024-11-14 NOTE — DISCHARGE NOTE NURSING/CASE MANAGEMENT/SOCIAL WORK - NSDCPEFALRISK_GEN_ALL_CORE
For information on Fall & Injury Prevention, visit: [email or web address] OR  [email or web address] OR  https://www.cdc.gov/steadi/patient.html

## 2024-11-14 NOTE — DISCHARGE NOTE PROVIDER - PROVIDER TOKENS
PROVIDER:[TOKEN:[[id number]],FOLLOWUP:[1 week]],PROVIDER:[TOKEN:[[id number]],FOLLOWUP:[1 week]]

## 2024-11-14 NOTE — DISCHARGE NOTE PROVIDER - ATTENDING DISCHARGE PHYSICAL EXAMINATION:
T(C): 36.8 (11-14-24 @ 08:50), Max: 37.4 (11-13-24 @ 20:08)  HR: 70 (11-14-24 @ 08:50) (70 - 90)  BP: 138/69 (11-14-24 @ 08:50) (110/67 - 148/68)  RR: 18 (11-14-24 @ 08:50) (18 - 18)  SpO2: 94% (11-14-24 @ 08:50) (94% - 96%)    CONSTITUTIONAL: no apparent distress  EYES: PERRLA, EOMI, non-icteric  ENMT: Oral mucosa with moist membranes  RESP: No respiratory distress, clear to auscultation bilaterally, no wheezes or rales  CV: RRR, +S1S2, +1 peripheral edema  GI: Soft, NT, ND  PSYCH: A+O x 3, mood and affect appropriate  NEURO: Cooperative, upper and lower motor function grossly intact bilaterally, sensation grossly intact throughout

## 2024-11-14 NOTE — DISCHARGE NOTE PROVIDER - NSDCFUSCHEDAPPT_GEN_ALL_CORE_FT
[facility] Physician [facility]  JERSEY Selby E Brian S  Scheduled Appointment: 12/09/2024

## 2024-11-14 NOTE — PROGRESS NOTE ADULT - ASSESSMENT
79M with a history of coronary artery disease, gastroesophageal reflux, pancreatic mass, and glaucoma who presented to [facility] with hematemesis and melena found to have anemia requiring transfusion of packed red blood cells. CT of the abdomen was notable for an enlarged heterogenous pancreatic head, common bile duct dilation, duodenal thickening, gastric distention, and liver hypodensities. The patient was transfered to [facility] for evaluation and underwent endoscopy with findings of a duodenal mass. Packed red blood cells were transfused for recurrent anemia and the patient underwent radiation therapy for tumor bleeding.    Acute blood loss anemia  - Endoscopy (11/1) noted old blood, ulcerated mucosa in the duodenal bulb, and duodenal mass  - s/p radiation on 11/7  - hgb stable today  - s/p retacrit 40K x1 on 11/11  - c/w PPI BID  - [place] blood and cancer following  - trend cbc    Gastric outlet obstruction 2/2 pancreatic mass  - s/p stent with GI 11/11  - tolerating full liquid diet   - discussed with GI, c/w full liquids today, will f/u for dispo planning  - surgery following, if no improvement after stent placement may be candidate for palliative surgery    Thrombocytopenia  - stable  - heme/onc following, f/u lab results  - Monitoring platelet counts    Hypoxia 2/2 PNA - resolved  - Improved, currently on RA  - Afebrile, WBC wnl  - CXR without congestion but RLL infiltrate  - procal elevated, c/w abx for total 5 days (last day tomorrow)    Acute kidney injury - resolved   - possibly 2/2 IV lasix  - s/p intravenous fluids  - Continue to monitor renal functions    Pancreatic / Duodenal mass  - Prior MRI(10/29) noted few small poorly delineated hepatic lesions, likely metastases, 4.5 x 3.5 cm infiltrative pancreatic head mass invading the proximal duodenum, interruption of the intrapancreatic portion of the common bile and main pancreatic ducts at the level of the mass with only minimal upstream ductal dilatation  - CA 19-9 level elevated  - s/p radiation 11/7  - Biopsy results noted adenocarcinoma, pt aware  - Oncology following. planning for outpatient treatment once stable for d/c    Coronary artery disease / Hypertension  - BP in 130s-140s, occasionally lower  - Aspirin on hold due to bleeding and anemia  - c/w coreg 6.25mg BID  - c/w spironolactone given LE edema from fluids    Glaucoma  - On timolol eye drops      Dispo: pending GI recs, likely d/c today vs tomorrow

## 2024-11-14 NOTE — PROGRESS NOTE ADULT - SUBJECTIVE AND OBJECTIVE BOX
[facility] Division of Medicine    SUBJECTIVE / OVERNIGHT EVENTS: No overnight events as per RN. Pt seen at the bedside. Tolerating full liquid diet. Denies any new complaints. All other systems reviewed and are negative.    MEDICATIONS  (STANDING):  bisacodyl Suppository 10 milliGRAM(s) Rectal once  carvedilol 6.25 milliGRAM(s) Oral every 12 hours  cefTRIAXone Injectable. 1000 milliGRAM(s) IV Push every 24 hours  glycerin Suppository - Adult 1 Suppository(s) Rectal at bedtime  pantoprazole  Injectable 40 milliGRAM(s) IV Push two times a day  rosuvastatin 10 milliGRAM(s) Oral at bedtime  senna 2 Tablet(s) Oral at bedtime  spironolactone 25 milliGRAM(s) Oral daily  sucralfate 1 Gram(s) Oral four times a day  sucralfate suspension 1 Gram(s) Oral every 6 hours  tamsulosin 0.4 milliGRAM(s) Oral at bedtime  timolol 0.5% Solution 1 Drop(s) Both EYES at bedtime    MEDICATIONS  (PRN):  acetaminophen     Tablet .. 650 milliGRAM(s) Oral every 6 hours PRN Temp greater or equal to 38C (100.4F), Mild Pain (1 - 3)  aluminum hydroxide/magnesium hydroxide/simethicone Suspension 30 milliLiter(s) Oral every 4 hours PRN Dyspepsia  melatonin 3 milliGRAM(s) Oral at bedtime PRN Insomnia  metoclopramide Injectable 5 milliGRAM(s) IV Push every 6 hours PRN nausea      I&O's Summary      acetaminophen     Tablet .. 650 milliGRAM(s) Oral every 6 hours PRN  aluminum hydroxide/magnesium hydroxide/simethicone Suspension 30 milliLiter(s) Oral every 4 hours PRN  bisacodyl Suppository 10 milliGRAM(s) Rectal once  carvedilol 6.25 milliGRAM(s) Oral every 12 hours  cefTRIAXone Injectable. 1000 milliGRAM(s) IV Push every 24 hours  glycerin Suppository - Adult 1 Suppository(s) Rectal at bedtime  melatonin 3 milliGRAM(s) Oral at bedtime PRN  metoclopramide Injectable 5 milliGRAM(s) IV Push every 6 hours PRN  pantoprazole  Injectable 40 milliGRAM(s) IV Push two times a day  rosuvastatin 10 milliGRAM(s) Oral at bedtime  senna 2 Tablet(s) Oral at bedtime  spironolactone 25 milliGRAM(s) Oral daily  sucralfate 1 Gram(s) Oral four times a day  sucralfate suspension 1 Gram(s) Oral every 6 hours  tamsulosin 0.4 milliGRAM(s) Oral at bedtime  timolol 0.5% Solution 1 Drop(s) Both EYES at bedtime      PHYSICAL EXAM:  Vital Signs Last 24 Hrs  T(C): 36.7 (14 Nov 2024 04:50), Max: 37.4 (13 Nov 2024 20:08)  T(F): 98.1 (14 Nov 2024 04:50), Max: 99.4 (13 Nov 2024 20:08)  HR: 75 (14 Nov 2024 04:50) (70 - 90)  BP: 138/63 (14 Nov 2024 04:50) (110/67 - 154/72)  BP(mean): --  RR: 18 (14 Nov 2024 04:50) (18 - 18)  SpO2: 94% (14 Nov 2024 04:50) (93% - 96%)    Parameters below as of 14 Nov 2024 04:50  Patient On (Oxygen Delivery Method): room air          CONSTITUTIONAL: no apparent distress  EYES: PERRLA  ENMT: Oral mucosa with moist membranes  RESP: No respiratory distress, clear to auscultation bilaterally, no wheezes or rales  CV: RRR, +S1S2, +1 peripheral edema  GI: Soft, NT, ND, no rebound, no guarding  PSYCH: A+O x 3, mood and affect appropriate  NEURO: Cooperative, upper and lower motor function grossly intact bilaterally, sensation grossly intact throughout      LABS:                        8.9    5.13  )-----------( 90       ( 14 Nov 2024 05:24 )             27.0     11-13    138  |  108  |  14.3  ----------------------------<  109[H]  3.5   |  21.0[L]  |  0.88    Ca    7.8[L]      13 Nov 2024 05:14            Urinalysis Basic - ( 13 Nov 2024 05:14 )    Color: x / Appearance: x / SG: x / pH: x  Gluc: 109 mg/dL / Ketone: x  / Bili: x / Urobili: x   Blood: x / Protein: x / Nitrite: x   Leuk Esterase: x / RBC: x / WBC x   Sq Epi: x / Non Sq Epi: x / Bacteria: x        CAPILLARY BLOOD GLUCOSE          IMAGING:

## 2024-11-14 NOTE — PROGRESS NOTE ADULT - REASON FOR ADMISSION
Acute blood loss anemia due to GI bleeding from duodenal ulcer, pancreatic mass
Bleed from duodenal ulcer, adenocarcinoma of pancreas
Acute blood loss anemia due to GI bleeding from duodenal ulcer, pancreatic mass

## 2024-11-14 NOTE — PROGRESS NOTE ADULT - PROVIDER SPECIALTY LIST ADULT
Gastroenterology
Heme/Onc
Hospitalist
Hospitalist
Surgery
Surgery
Gastroenterology
Heme/Onc
Hospitalist
Surgery
Gastroenterology
Heme/Onc
Heme/Onc
Hospitalist
Surgery
Gastroenterology
Heme/Onc
Heme/Onc
Hospitalist
Hospitalist
Surgery
Gastroenterology
Hospitalist
Gastroenterology
Gastroenterology
Hospitalist
Gastroenterology

## 2024-11-14 NOTE — DISCHARGE NOTE NURSING/CASE MANAGEMENT/SOCIAL WORK - FINANCIAL ASSISTANCE
[facility] provides services at a reduced cost to those who are determined to be eligible through [facility]’s financial assistance program. Information regarding [facility]’s financial assistance program can be found by going to [email or web address] or by calling [phone].

## 2024-11-26 PROCEDURE — P9016: CPT

## 2024-11-26 PROCEDURE — 81288 MLH1 GENE: CPT

## 2024-11-26 PROCEDURE — 88381 MICRODISSECTION MANUAL: CPT

## 2024-11-26 PROCEDURE — 82962 GLUCOSE BLOOD TEST: CPT

## 2024-11-26 PROCEDURE — C9399: CPT

## 2024-11-26 PROCEDURE — 86850 RBC ANTIBODY SCREEN: CPT

## 2024-11-26 PROCEDURE — 74174 CTA ABD&PLVS W/CONTRAST: CPT | Mod: MC

## 2024-11-26 PROCEDURE — 86901 BLOOD TYPING SEROLOGIC RH(D): CPT

## 2024-11-26 PROCEDURE — C1876: CPT

## 2024-11-26 PROCEDURE — 80053 COMPREHEN METABOLIC PANEL: CPT

## 2024-11-26 PROCEDURE — 87640 STAPH A DNA AMP PROBE: CPT

## 2024-11-26 PROCEDURE — C1773: CPT

## 2024-11-26 PROCEDURE — 87641 MR-STAPH DNA AMP PROBE: CPT

## 2024-11-26 PROCEDURE — 86301 IMMUNOASSAY TUMOR CA 19-9: CPT

## 2024-11-26 PROCEDURE — 88305 TISSUE EXAM BY PATHOLOGIST: CPT

## 2024-11-26 PROCEDURE — 74329 X-RAY FOR PANCREAS ENDOSCOPY: CPT

## 2024-11-26 PROCEDURE — 85027 COMPLETE CBC AUTOMATED: CPT

## 2024-11-26 PROCEDURE — 36415 COLL VENOUS BLD VENIPUNCTURE: CPT

## 2024-11-26 PROCEDURE — 83735 ASSAY OF MAGNESIUM: CPT

## 2024-11-26 PROCEDURE — 88173 CYTOPATH EVAL FNA REPORT: CPT

## 2024-11-26 PROCEDURE — 85025 COMPLETE CBC W/AUTO DIFF WBC: CPT

## 2024-11-26 PROCEDURE — P9059: CPT

## 2024-11-26 PROCEDURE — 84145 PROCALCITONIN (PCT): CPT

## 2024-11-26 PROCEDURE — 71045 X-RAY EXAM CHEST 1 VIEW: CPT

## 2024-11-26 PROCEDURE — 85610 PROTHROMBIN TIME: CPT

## 2024-11-26 PROCEDURE — C1769: CPT

## 2024-11-26 PROCEDURE — 85730 THROMBOPLASTIN TIME PARTIAL: CPT

## 2024-11-26 PROCEDURE — C1052: CPT

## 2024-11-26 PROCEDURE — 80048 BASIC METABOLIC PNL TOTAL CA: CPT

## 2024-11-26 PROCEDURE — 86923 COMPATIBILITY TEST ELECTRIC: CPT

## 2024-11-26 PROCEDURE — 74018 RADEX ABDOMEN 1 VIEW: CPT

## 2024-11-26 PROCEDURE — 93971 EXTREMITY STUDY: CPT

## 2024-11-26 PROCEDURE — 86900 BLOOD TYPING SEROLOGIC ABO: CPT

## 2024-11-26 PROCEDURE — 36430 TRANSFUSION BLD/BLD COMPNT: CPT

## 2024-12-06 LAB — SURGICAL PATHOLOGY STUDY: SIGNIFICANT CHANGE UP

## 2024-12-09 ENCOUNTER — APPOINTMENT (OUTPATIENT)
Dept: RADIATION ONCOLOGY | Facility: CLINIC | Age: 79
End: 2024-12-09

## 2024-12-30 ENCOUNTER — APPOINTMENT (OUTPATIENT)
Dept: RADIATION ONCOLOGY | Facility: CLINIC | Age: 79
End: 2024-12-30
Payer: MEDICARE

## 2024-12-30 ENCOUNTER — APPOINTMENT (OUTPATIENT)
Dept: RADIATION ONCOLOGY | Facility: CLINIC | Age: 79
End: 2024-12-30

## 2024-12-30 ENCOUNTER — NON-APPOINTMENT (OUTPATIENT)
Age: 79
End: 2024-12-30

## 2024-12-30 DIAGNOSIS — C25.9 MALIGNANT NEOPLASM OF PANCREAS, UNSPECIFIED: ICD-10-CM

## 2024-12-30 PROCEDURE — 99443: CPT | Mod: 93

## 2025-02-24 ENCOUNTER — NON-APPOINTMENT (OUTPATIENT)
Age: 80
End: 2025-02-24

## 2025-02-24 ENCOUNTER — APPOINTMENT (OUTPATIENT)
Dept: OPHTHALMOLOGY | Facility: CLINIC | Age: 80
End: 2025-02-24
Payer: MEDICARE

## 2025-02-24 PROCEDURE — 92014 COMPRE OPH EXAM EST PT 1/>: CPT

## 2025-02-24 PROCEDURE — 92134 CPTRZ OPH DX IMG PST SGM RTA: CPT

## 2025-05-13 ENCOUNTER — APPOINTMENT (OUTPATIENT)
Dept: GASTROENTEROLOGY | Facility: CLINIC | Age: 80
End: 2025-05-13
Payer: MEDICARE

## 2025-05-13 DIAGNOSIS — K31.1 ADULT HYPERTROPHIC PYLORIC STENOSIS: ICD-10-CM

## 2025-05-13 DIAGNOSIS — C17.0 MALIGNANT NEOPLASM OF DUODENUM: ICD-10-CM

## 2025-05-13 PROCEDURE — 99213 OFFICE O/P EST LOW 20 MIN: CPT | Mod: 93

## (undated) DEVICE — WARMING BLANKET FULL ADULT

## (undated) DEVICE — TUBING IV EXTENSION MACRO W CLAVE 7"

## (undated) DEVICE — DENTURE CUP PINK

## (undated) DEVICE — BASIN EMESIS 10IN GRADUATED MAUVE

## (undated) DEVICE — DRSG CURITY GAUZE SPONGE 4 X 4" 12-PLY NON-STERILE

## (undated) DEVICE — SOL BAG NS 0.9% 1000ML

## (undated) DEVICE — BITE BLOCK ADULT 20 X 27MM (GREEN)

## (undated) DEVICE — UNDERPAD LINEN SAVER 23 X 36"

## (undated) DEVICE — PACK IV START WITH CHG

## (undated) DEVICE — VENODYNE/SCD SLEEVE CALF MEDIUM

## (undated) DEVICE — SSH-ENDOSCOPE, THERAPEUTIC 2244561: Type: DURABLE MEDICAL EQUIPMENT

## (undated) DEVICE — CATH IV SAFE BC 22G X 1" (BLUE)

## (undated) DEVICE — MASK PROCEED EARLOOP LVL 2 50/BX

## (undated) DEVICE — OMNIPAQUE 300  30ML

## (undated) DEVICE — SYR LUER SLIP TIP 50CC

## (undated) DEVICE — GOWN IMPERV XL

## (undated) DEVICE — SOL IRR BAG NS 0.9% 1000ML

## (undated) DEVICE — NDL ASPIRATION EZ SHOT 25G DISP

## (undated) DEVICE — DRSG 2X2

## (undated) DEVICE — SENSOR O2 FINGER ADULT

## (undated) DEVICE — TUBING ALARIS PUMP MODULE NON-DEHP

## (undated) DEVICE — SYR SLIP 10CC

## (undated) DEVICE — KIT DEFENDO 4 OLY 4 PC

## (undated) DEVICE — DVC CLEVERLOCK

## (undated) DEVICE — FORCEP RADIAL JAW 4 W NDL 2.4MM 2.8MM 240CM ORANGE DISP

## (undated) DEVICE — SOL IRR BAG H2O 1000ML

## (undated) DEVICE — SYR IV FLUSH SALINE 10ML 30/TY